# Patient Record
Sex: FEMALE | Race: WHITE | NOT HISPANIC OR LATINO | Employment: OTHER | URBAN - METROPOLITAN AREA
[De-identification: names, ages, dates, MRNs, and addresses within clinical notes are randomized per-mention and may not be internally consistent; named-entity substitution may affect disease eponyms.]

---

## 2023-12-07 ENCOUNTER — TRANSCRIBE ORDERS (OUTPATIENT)
Dept: CARDIOLOGY | Facility: CLINIC | Age: 78
End: 2023-12-07
Payer: MEDICARE

## 2023-12-07 DIAGNOSIS — Z01.810 PREOP CARDIOVASCULAR EXAM: ICD-10-CM

## 2023-12-07 DIAGNOSIS — I35.0 NONRHEUMATIC AORTIC VALVE STENOSIS: Primary | ICD-10-CM

## 2023-12-08 PROBLEM — I35.0 NONRHEUMATIC AORTIC VALVE STENOSIS: Status: ACTIVE | Noted: 2023-12-07

## 2023-12-13 RX ORDER — ATORVASTATIN CALCIUM 40 MG/1
40 TABLET, FILM COATED ORAL DAILY
Status: ON HOLD | COMMUNITY
End: 2023-12-14

## 2023-12-13 RX ORDER — HYDROCHLOROTHIAZIDE 25 MG/1
25 TABLET ORAL DAILY PRN
COMMUNITY

## 2023-12-13 RX ORDER — LISINOPRIL 40 MG/1
40 TABLET ORAL DAILY
COMMUNITY

## 2023-12-13 RX ORDER — METOPROLOL SUCCINATE 25 MG/1
25 TABLET, EXTENDED RELEASE ORAL DAILY
Status: ON HOLD | COMMUNITY
End: 2023-12-14

## 2023-12-14 ENCOUNTER — HOSPITAL ENCOUNTER (OUTPATIENT)
Facility: HOSPITAL | Age: 78
Setting detail: OBSERVATION
Discharge: HOME | End: 2023-12-15
Attending: INTERNAL MEDICINE | Admitting: INTERNAL MEDICINE
Payer: MEDICARE

## 2023-12-14 DIAGNOSIS — R06.09 DOE (DYSPNEA ON EXERTION): ICD-10-CM

## 2023-12-14 DIAGNOSIS — I35.0 NONRHEUMATIC AORTIC VALVE STENOSIS: ICD-10-CM

## 2023-12-14 DIAGNOSIS — I25.119 CORONARY ARTERY DISEASE INVOLVING NATIVE CORONARY ARTERY OF NATIVE HEART WITH ANGINA PECTORIS: Primary | ICD-10-CM

## 2023-12-14 LAB
QT INTERVAL: 416 MS
QTC INTERVAL: 465 MS

## 2023-12-14 PROCEDURE — C1769 GUIDE WIRE: HCPCS | Performed by: INTERNAL MEDICINE

## 2023-12-14 PROCEDURE — 99152 MOD SED SAME PHYS/QHP 5/>YRS: CPT | Performed by: INTERNAL MEDICINE

## 2023-12-14 PROCEDURE — 0715T PR PERCUTANEOUS TRANSLUMINAL CORONARY LITHOTRIPSY: CPT | Performed by: INTERNAL MEDICINE

## 2023-12-14 PROCEDURE — C1887 CATHETER, GUIDING: HCPCS | Performed by: INTERNAL MEDICINE

## 2023-12-14 PROCEDURE — C1894 INTRO/SHEATH, NON-LASER: HCPCS | Performed by: INTERNAL MEDICINE

## 2023-12-14 PROCEDURE — C1725 CATH, TRANSLUMIN NON-LASER: HCPCS | Performed by: INTERNAL MEDICINE

## 2023-12-14 PROCEDURE — S0260 H&P FOR SURGERY: HCPCS | Performed by: INTERNAL MEDICINE

## 2023-12-14 PROCEDURE — 25010000002 HEPARIN (PORCINE) PER 1000 UNITS: Performed by: INTERNAL MEDICINE

## 2023-12-14 PROCEDURE — 99153 MOD SED SAME PHYS/QHP EA: CPT | Performed by: INTERNAL MEDICINE

## 2023-12-14 PROCEDURE — 63710000001 CLOPIDOGREL 75 MG TABLET: Performed by: INTERNAL MEDICINE

## 2023-12-14 PROCEDURE — 63710000001 ASPIRIN 325 MG TABLET: Performed by: INTERNAL MEDICINE

## 2023-12-14 PROCEDURE — 92928 PRQ TCAT PLMT NTRAC ST 1 LES: CPT | Performed by: INTERNAL MEDICINE

## 2023-12-14 PROCEDURE — A9270 NON-COVERED ITEM OR SERVICE: HCPCS | Performed by: INTERNAL MEDICINE

## 2023-12-14 PROCEDURE — C1874 STENT, COATED/COV W/DEL SYS: HCPCS | Performed by: INTERNAL MEDICINE

## 2023-12-14 PROCEDURE — 25510000001 IOPAMIDOL PER 1 ML: Performed by: INTERNAL MEDICINE

## 2023-12-14 PROCEDURE — C1761: HCPCS | Performed by: INTERNAL MEDICINE

## 2023-12-14 PROCEDURE — 93010 ELECTROCARDIOGRAM REPORT: CPT | Performed by: INTERNAL MEDICINE

## 2023-12-14 PROCEDURE — 93454 CORONARY ARTERY ANGIO S&I: CPT | Performed by: INTERNAL MEDICINE

## 2023-12-14 PROCEDURE — 25810000003 SODIUM CHLORIDE 0.9 % SOLUTION: Performed by: INTERNAL MEDICINE

## 2023-12-14 PROCEDURE — G0378 HOSPITAL OBSERVATION PER HR: HCPCS

## 2023-12-14 PROCEDURE — 25010000002 MIDAZOLAM PER 1 MG: Performed by: INTERNAL MEDICINE

## 2023-12-14 PROCEDURE — 63710000001 ATORVASTATIN 10 MG TABLET: Performed by: INTERNAL MEDICINE

## 2023-12-14 PROCEDURE — 25010000002 FENTANYL CITRATE (PF) 50 MCG/ML SOLUTION: Performed by: INTERNAL MEDICINE

## 2023-12-14 PROCEDURE — C9600 PERC DRUG-EL COR STENT SING: HCPCS | Performed by: INTERNAL MEDICINE

## 2023-12-14 PROCEDURE — 93005 ELECTROCARDIOGRAM TRACING: CPT | Performed by: INTERNAL MEDICINE

## 2023-12-14 PROCEDURE — 85347 COAGULATION TIME ACTIVATED: CPT

## 2023-12-14 PROCEDURE — 0715T: CPT | Performed by: INTERNAL MEDICINE

## 2023-12-14 PROCEDURE — 63710000001 LISINOPRIL 20 MG TABLET: Performed by: INTERNAL MEDICINE

## 2023-12-14 DEVICE — XIENCE SKYPOINT™ EVEROLIMUS ELUTING CORONARY STENT SYSTEM 3.50 MM X 15 MM / RAPID-EXCHANGE
Type: IMPLANTABLE DEVICE | Site: CORONARY | Status: FUNCTIONAL
Brand: XIENCE SKYPOINT™

## 2023-12-14 DEVICE — XIENCE SKYPOINT™ EVEROLIMUS ELUTING CORONARY STENT SYSTEM 3.50 MM X 38 MM / RAPID-EXCHANGE
Type: IMPLANTABLE DEVICE | Site: CORONARY | Status: FUNCTIONAL
Brand: XIENCE SKYPOINT™

## 2023-12-14 RX ORDER — SODIUM CHLORIDE 9 MG/ML
40 INJECTION, SOLUTION INTRAVENOUS AS NEEDED
Status: DISCONTINUED | OUTPATIENT
Start: 2023-12-14 | End: 2023-12-14

## 2023-12-14 RX ORDER — LISINOPRIL 20 MG/1
40 TABLET ORAL DAILY
Status: DISCONTINUED | OUTPATIENT
Start: 2023-12-14 | End: 2023-12-15 | Stop reason: HOSPADM

## 2023-12-14 RX ORDER — HEPARIN SODIUM 1000 [USP'U]/ML
INJECTION, SOLUTION INTRAVENOUS; SUBCUTANEOUS
Status: DISCONTINUED | OUTPATIENT
Start: 2023-12-14 | End: 2023-12-14 | Stop reason: HOSPADM

## 2023-12-14 RX ORDER — FENTANYL CITRATE 50 UG/ML
INJECTION, SOLUTION INTRAMUSCULAR; INTRAVENOUS
Status: DISCONTINUED | OUTPATIENT
Start: 2023-12-14 | End: 2023-12-14 | Stop reason: HOSPADM

## 2023-12-14 RX ORDER — SODIUM CHLORIDE 0.9 % (FLUSH) 0.9 %
10 SYRINGE (ML) INJECTION AS NEEDED
Status: DISCONTINUED | OUTPATIENT
Start: 2023-12-14 | End: 2023-12-14

## 2023-12-14 RX ORDER — NITROGLYCERIN 0.4 MG/1
0.4 TABLET SUBLINGUAL
Status: DISCONTINUED | OUTPATIENT
Start: 2023-12-14 | End: 2023-12-15 | Stop reason: HOSPADM

## 2023-12-14 RX ORDER — CLOPIDOGREL BISULFATE 75 MG/1
TABLET ORAL
Status: DISCONTINUED | OUTPATIENT
Start: 2023-12-14 | End: 2023-12-14 | Stop reason: HOSPADM

## 2023-12-14 RX ORDER — SODIUM CHLORIDE 0.9 % (FLUSH) 0.9 %
10 SYRINGE (ML) INJECTION EVERY 12 HOURS SCHEDULED
Status: DISCONTINUED | OUTPATIENT
Start: 2023-12-14 | End: 2023-12-14

## 2023-12-14 RX ORDER — METOPROLOL TARTRATE 1 MG/ML
INJECTION, SOLUTION INTRAVENOUS
Status: DISCONTINUED | OUTPATIENT
Start: 2023-12-14 | End: 2023-12-14 | Stop reason: HOSPADM

## 2023-12-14 RX ORDER — CLOPIDOGREL BISULFATE 75 MG/1
75 TABLET ORAL DAILY
Status: DISCONTINUED | OUTPATIENT
Start: 2023-12-15 | End: 2023-12-15 | Stop reason: HOSPADM

## 2023-12-14 RX ORDER — SODIUM CHLORIDE 9 MG/ML
75 INJECTION, SOLUTION INTRAVENOUS CONTINUOUS
Status: DISCONTINUED | OUTPATIENT
Start: 2023-12-14 | End: 2023-12-15 | Stop reason: HOSPADM

## 2023-12-14 RX ORDER — MIDAZOLAM HYDROCHLORIDE 1 MG/ML
INJECTION INTRAMUSCULAR; INTRAVENOUS
Status: DISCONTINUED | OUTPATIENT
Start: 2023-12-14 | End: 2023-12-14 | Stop reason: HOSPADM

## 2023-12-14 RX ORDER — SODIUM CHLORIDE 9 MG/ML
75 INJECTION, SOLUTION INTRAVENOUS CONTINUOUS
Status: ACTIVE | OUTPATIENT
Start: 2023-12-14 | End: 2023-12-15

## 2023-12-14 RX ORDER — ACETAMINOPHEN 325 MG/1
650 TABLET ORAL EVERY 4 HOURS PRN
Status: DISCONTINUED | OUTPATIENT
Start: 2023-12-14 | End: 2023-12-15 | Stop reason: HOSPADM

## 2023-12-14 RX ORDER — ASPIRIN 81 MG/1
81 TABLET ORAL DAILY
Status: DISCONTINUED | OUTPATIENT
Start: 2023-12-14 | End: 2023-12-15 | Stop reason: HOSPADM

## 2023-12-14 RX ORDER — ATORVASTATIN CALCIUM 10 MG/1
40 TABLET, FILM COATED ORAL NIGHTLY
Status: DISCONTINUED | OUTPATIENT
Start: 2023-12-14 | End: 2023-12-15 | Stop reason: HOSPADM

## 2023-12-14 RX ORDER — VERAPAMIL HYDROCHLORIDE 2.5 MG/ML
INJECTION, SOLUTION INTRAVENOUS
Status: DISCONTINUED | OUTPATIENT
Start: 2023-12-14 | End: 2023-12-14 | Stop reason: HOSPADM

## 2023-12-14 RX ORDER — HYDROCODONE BITARTRATE AND ACETAMINOPHEN 5; 325 MG/1; MG/1
1 TABLET ORAL EVERY 4 HOURS PRN
Status: DISCONTINUED | OUTPATIENT
Start: 2023-12-14 | End: 2023-12-15 | Stop reason: HOSPADM

## 2023-12-14 RX ORDER — ASPIRIN 325 MG
TABLET ORAL
Status: DISCONTINUED | OUTPATIENT
Start: 2023-12-14 | End: 2023-12-14 | Stop reason: HOSPADM

## 2023-12-14 RX ORDER — HYDROCHLOROTHIAZIDE 25 MG/1
25 TABLET ORAL DAILY
Status: DISCONTINUED | OUTPATIENT
Start: 2023-12-14 | End: 2023-12-15

## 2023-12-14 RX ORDER — LIDOCAINE HYDROCHLORIDE 20 MG/ML
INJECTION, SOLUTION INFILTRATION; PERINEURAL
Status: DISCONTINUED | OUTPATIENT
Start: 2023-12-14 | End: 2023-12-14 | Stop reason: HOSPADM

## 2023-12-14 RX ADMIN — SODIUM CHLORIDE 75 ML/HR: 9 INJECTION, SOLUTION INTRAVENOUS at 14:08

## 2023-12-14 RX ADMIN — SODIUM CHLORIDE 75 ML/HR: 9 INJECTION, SOLUTION INTRAVENOUS at 09:47

## 2023-12-14 RX ADMIN — LISINOPRIL 40 MG: 20 TABLET ORAL at 16:34

## 2023-12-14 RX ADMIN — ATORVASTATIN CALCIUM 40 MG: 10 TABLET, FILM COATED ORAL at 20:41

## 2023-12-14 NOTE — Clinical Note
First balloon inflation max pressure = 20 zaheer. First balloon inflation duration = 5 seconds. Second inflation of balloon - Max pressure = 20 zaheer. 2nd Inflation of balloon - Duration = 5 seconds.

## 2023-12-14 NOTE — Clinical Note
Stent balloon removed intact. PROVIDER:[TOKEN:[[id number]],FOLLOWUP:[1-3 days]],PROVIDER:[TOKEN:[[id number]]]

## 2023-12-14 NOTE — Clinical Note
First balloon inflation max pressure = 6 zaheer. First balloon inflation duration = 5 seconds. Second inflation of balloon - Max pressure = 6 zaheer. 2nd Inflation of balloon - Duration = 5 seconds. Third inflation of balloon - Max pressure = 6 zaheer. 3rd Inflation of balloon - Duration = 5 seconds. Fourth inflation of balloon - Max pressure = 6 zaheer. 4th Inflation of balloon - Duration = 5 seconds.

## 2023-12-14 NOTE — PROGRESS NOTES
Patient Care Team:  Provider, No Known as PCP - General    Chief complaint: SOB    Subjective : SOB with minimal exertion; denies chest discomfort     History of Present Illness  Patient is a 78 y.o. female who follows with Dr. Luz at [facility]. She has a past medical history including hypertension and hyperlipidemia.  She presented with new onset shortness of breath and hypertension. She experiences SOB with minimal exertion, stating she can't even walk two feet without needing to stop to catch her breath. She had chest pain but that was relieved when her hypertension improved. She denies dizziness, syncope, PND, or orthopnea. She was found to have a murmur and underwent an echocardiogram that reportedly revealed severe aortic stenosis. We are awaiting these images. She was referred to Dr. Gerber and presented today as an outpatient for cardiac cath. She was found to have 90% stenosis of the mLAD and underwent AGUEDA.  CTS has been consulted for surgical opinion of TAVR versus SAVR.    Review of Systems   Constitutional:  Positive for activity change.   HENT: Negative.     Respiratory:  Positive for shortness of breath.    Cardiovascular:  Negative for chest pain and palpitations.   Musculoskeletal: Negative.    Neurological: Negative.  Negative for dizziness and light-headedness.   Hematological: Negative.         Past Medical History:   Diagnosis Date    Angina pectoris     Dyspnea on exertion     HLD (hyperlipidemia)     Hypertension      Past Surgical History:   Procedure Laterality Date    BREAST LUMPECTOMY Left     ORIF ANKLE FRACTURE Right      History reviewed. No pertinent family history.  Social History     Tobacco Use    Smoking status: Never   Substance Use Topics    Alcohol use: Yes     Comment: 'seldom'    Drug use: Not Currently     Medications Prior to Admission   Medication Sig Dispense Refill Last Dose    hydroCHLOROthiazide (HYDRODIURIL) 25 MG tablet Take 1 tablet by mouth Daily.   " 12/13/2023    lisinopril (PRINIVIL,ZESTRIL) 40 MG tablet Take 1 tablet by mouth Daily.   12/13/2023     aspirin, 81 mg, Oral, Daily  atorvastatin, 40 mg, Oral, Nightly  [START ON 12/15/2023] clopidogrel, 75 mg, Oral, Daily  hydroCHLOROthiazide, 25 mg, Oral, Daily  lisinopril, 40 mg, Oral, Daily  sodium chloride, 10 mL, Intravenous, Q12H      Allergies:  Patient has no known allergies.    Objective      Vital Signs  Temp:  [97.7 °F (36.5 °C)-98 °F (36.7 °C)] 97.7 °F (36.5 °C)  Heart Rate:  [] 81  Resp:  [12-18] 18  BP: (110-171)/() 143/85    Flowsheet Rows      Flowsheet Row First Filed Value   Admission Height 165.1 cm (65\") Documented at 12/14/2023 0944   Admission Weight 107 kg (235 lb) Documented at 12/14/2023 0944          165.1 cm (65\")    Physical Exam  Constitutional:       Appearance: She is obese.   HENT:      Head: Normocephalic.      Nose: Nose normal.      Mouth/Throat:      Mouth: Mucous membranes are moist.   Eyes:      Pupils: Pupils are equal, round, and reactive to light.   Cardiovascular:      Rate and Rhythm: Normal rate and regular rhythm.   Pulmonary:      Effort: Pulmonary effort is normal. No respiratory distress.   Abdominal:      General: Bowel sounds are normal.   Musculoskeletal:         General: Normal range of motion.      Cervical back: Normal range of motion.   Skin:     Capillary Refill: Capillary refill takes 2 to 3 seconds.      Coloration: Skin is pale.   Neurological:      General: No focal deficit present.      Mental Status: She is alert and oriented to person, place, and time.   Psychiatric:         Mood and Affect: Mood normal.         Results Review:   Lab Results (last 24 hours)       ** No results found for the last 24 hours. **                Assessment & Plan       Nonrheumatic aortic valve stenosis    EATON (dyspnea on exertion)    Coronary artery disease involving native coronary artery of native heart with angina pectoris    Aortic stenosis      Assessment & " Plan    -Severe nonrheumatic aortic valve stenosis  -CAD, s/p AGUEDA to mLAD--DAPT with ASA/Plavix   -Acute diastolic congestive heart failure, NYHA class III  -HTN  -HLD  -Obesity     Dr. Deluca will review the films and provide his surgical recommendations. We are awaiting her echocardiogram for him to review. She will be spending the night s/p PCI and we will follow-up with her tomorrow.     Thank you for allowing us to participate in the care of this patient.      Monica Dimas, APRN  12/14/23  14:32 EST

## 2023-12-14 NOTE — Clinical Note
A 4 fr sheath was  inserted with ultrasound guidance into the right radial artery. Sheath insertion not delayed.

## 2023-12-14 NOTE — Clinical Note
First balloon inflation max pressure = 12 zaheer. First balloon inflation duration = 5 seconds. Second inflation of balloon - Max pressure = 12 zaheer. 2nd Inflation of balloon - Duration = 5 seconds. 2nd inflation was done at 11:39 EST.

## 2023-12-14 NOTE — Clinical Note
Hemostasis started on the right radial artery. R-Band was used in achieving hemostasis. Radial compression device applied to vessel. Hemostasis achieved successfully. Closure device additional comment: TR band 15 cc air

## 2023-12-14 NOTE — Clinical Note
ACT = 320 (sec). ACT was drawn at 11:40 EST. ACT result was completed at 11:50 EST. Pati from [facility] is calling about a fax they sent over for the patient. She is just wanting to know the status of it. Pati can be reached at [phone].

## 2023-12-14 NOTE — DISCHARGE INSTRUCTIONS
[facility]  [address] [place] [address]    Coronary Angioplasty with or without  Stent (Radial Approach) After Care    Refer to this sheet in the next few weeks. These instructions provide you with information on caring for yourself after your procedure. Your health care provider may also give you more specific instructions. Your treatment has been planned according to current medical practices, but problems sometimes occur. Call your health care provider if you have any problems or questions after your procedure.       Home Care Instructions:  You may shower the day after the procedure. Remove the bandage (dressing) and gently wash the site with plain soap and water. Gently pat the site dry. You may apply a band aid daily for 2 days if desired.    Do not apply powder or lotion to the site.  Do not submerge the affected site in water for 3 to 5 days or until the site is completely healed.   Do not flex or bend at the wrist with affected arm for 24 hours.  Do not lift, push or pull anything over 5 pounds for 5 days after your procedure or as directed by your physician.  For a reference, a gallon of milk weighs 8 pounds.    Do not operate machinery or power tools for 24 hours.  Inspect the site at least twice daily. You may notice some bruising at the site and it may be tender for 1 to 2 weeks.     Increase your fluid intake for the next 2 days.    Keep arm elevated for 24 hours.  For the remainder of the day, keep your arm in the “Pledge of Allegiance” position when up and about.    Limit your activity for the next 48 hours and avoid strenuous activity as directed by your physician.   Cardiac Rehab may or may not be ordered.  Please consult with your physician  You may drive 24 hours after the procedure unless otherwise instructed by your caregiver.  A responsible adult should be with you for the first 24 hours after you arrive home.   Do not make any important legal decisions or sign legal  papers for 24 hours. Do not drink alcohol for 24 hours.    Take medicines only as directed by your health care provider.  Blood thinners may be prescribed after your procedure to improve blood flow through the stent.    Metformin or any medications containing Metformin should not be taken for 48 hours after your procedure.    Eat a heart-healthy diet. This should include plenty of fresh fruits and vegetables. Meat should be lean cuts. Avoid the following types of food:    Food that is high in salt.    Canned or highly processed food.    Food that is high in saturated fat or sugar.    Fried food.    Make any other lifestyle changes recommended by your health care provider. This may include:    Not using any tobacco products including cigarettes, chewing tobacco, or electronic cigarettes.   Managing your weight.    Getting regular exercise.    Managing your blood pressure.    Limiting your alcohol intake.    Managing other health problems, such as diabetes.    If you need an MRI after your heart stent was placed, be sure to tell the health care provider who orders the MRI that you have a heart stent.    Keep all follow-up visits as directed by your health care provider.      Call Your Doctor If:    You have unusual pain at the radial/ulnar (wrist) site.  You have redness, warmth, swelling, or pain at the radial/ulnar (wrist) site.  You have drainage (other than a small amount of blood on the dressing).  `You have chills or a fever > 101.  Your arm becomes pale or dark, cool, tingly, or numb.  You develop chest pain, shortness of breath, feel faint or pass out.    You have heavy bleeding from the site.  If you do, hold pressure on the site for 20 minutes.  If the bleeding stops, apply a fresh bandage and call your cardiologist.  However, if you continue to have bleeding, maintain pressure and call 911.    You have any symptoms of a stroke.  Remember BE FAST  B-balance. Sudden trouble walking or loss of  balance.  E-eyes.  Sudden changes in how you see or a sudden onset of a very bad headache.   F-face. Sudden weakness or loss of feeling of the face or facial droop on one side.   A-arms Sudden weakness or numbness in one arm. One arm drifts down if they are both held out in front of you. This happens suddenly and usually on one side of the body.   S-speech.  Sudden trouble speaking, slurred speech or trouble understanding what people are saying.   T-time  Time to call emergency services.  Write down the symptoms and the time they started.

## 2023-12-14 NOTE — CONSULTS
Patient Care Team:  Provider, No Known as PCP - General    Chief complaint: SOB with minimal exertion; denies chest discomfort      Subjective     History of Present Illness  Patient is a 78 y.o. female who follows with Dr. Luz at the [facility]. She has a past medical history including hypertension and hyperlipidemia.  She presented with new onset shortness of breath and hypertension. She experiences SOB with minimal exertion, stating she can't even walk two feet without needing to stop to catch her breath. She had chest pain but that was relieved when her hypertension improved. She denies dizziness, syncope, PND, or orthopnea. She was found to have a murmur and underwent an echocardiogram that reportedly revealed severe aortic stenosis. We are awaiting these images. She was referred to Dr. Gerber and presented today as an outpatient for cardiac cath. She was found to have 90% stenosis of the mLAD and underwent AGUEDA.  CTS has been consulted for surgical opinion of TAVR versus SAVR.     Review of Systems   Constitutional:  Positive for activity change.   HENT: Negative.     Respiratory:  Positive for shortness of breath.    Cardiovascular:  Negative for chest pain, palpitations and leg swelling.   Genitourinary: Negative.    Allergic/Immunologic: Negative.    Neurological:  Positive for weakness. Negative for dizziness.   Psychiatric/Behavioral: Negative.          Past Medical History:   Diagnosis Date    Angina pectoris     Dyspnea on exertion     HLD (hyperlipidemia)     Hypertension      Past Surgical History:   Procedure Laterality Date    BREAST LUMPECTOMY Left     ORIF ANKLE FRACTURE Right      History reviewed. No pertinent family history.  Social History     Tobacco Use    Smoking status: Never   Substance Use Topics    Alcohol use: Yes     Comment: 'seldom'    Drug use: Not Currently     Medications Prior to Admission   Medication Sig Dispense Refill Last Dose    hydroCHLOROthiazide  "(HYDRODIURIL) 25 MG tablet Take 1 tablet by mouth Daily.   12/13/2023    lisinopril (PRINIVIL,ZESTRIL) 40 MG tablet Take 1 tablet by mouth Daily.   12/13/2023     aspirin, 81 mg, Oral, Daily  atorvastatin, 40 mg, Oral, Nightly  [START ON 12/15/2023] clopidogrel, 75 mg, Oral, Daily  hydroCHLOROthiazide, 25 mg, Oral, Daily  lisinopril, 40 mg, Oral, Daily  sodium chloride, 10 mL, Intravenous, Q12H      Allergies:  Patient has no known allergies.    Objective      Vital Signs  Temp:  [97.7 °F (36.5 °C)-98 °F (36.7 °C)] 97.7 °F (36.5 °C)  Heart Rate:  [] 81  Resp:  [12-18] 18  BP: (110-171)/() 143/85    Flowsheet Rows      Flowsheet Row First Filed Value   Admission Height 165.1 cm (65\") Documented at 12/14/2023 0944   Admission Weight 107 kg (235 lb) Documented at 12/14/2023 0944          165.1 cm (65\")    Physical Exam  Constitutional:       Appearance: She is obese.   HENT:      Head: Normocephalic.      Mouth/Throat:      Mouth: Mucous membranes are moist.   Eyes:      Pupils: Pupils are equal, round, and reactive to light.   Cardiovascular:      Rate and Rhythm: Normal rate.   Pulmonary:      Effort: Pulmonary effort is normal.   Abdominal:      General: Abdomen is flat.   Musculoskeletal:         General: Normal range of motion.      Cervical back: Normal range of motion.   Skin:     General: Skin is warm and dry.      Capillary Refill: Capillary refill takes 2 to 3 seconds.   Neurological:      General: No focal deficit present.      Mental Status: She is alert and oriented to person, place, and time.   Psychiatric:         Mood and Affect: Mood normal.         Results Review:   Lab Results (last 24 hours)       ** No results found for the last 24 hours. **              Assessment & Plan       Nonrheumatic aortic valve stenosis    EATON (dyspnea on exertion)    Coronary artery disease involving native coronary artery of native heart with angina pectoris    Aortic stenosis      Assessment & " Plan    -Severe nonrheumatic aortic valve stenosis  -CAD, s/p AGUEDA to mLAD--DAPT with ASA/Plavix   -Acute diastolic congestive heart failure, NYHA class III  -HTN  -HLD  -Obesity      Await echocardiogram from [facility] for evaluation and recommendation of SAVR vs.TAVR. Post PCI care per cardiology. Optimize medically.     Thank you for allowing us to participate in the care of this patient.      Monica Dimas, APRN  12/14/23  14:34 EST

## 2023-12-14 NOTE — H&P
Date of Hospital Visit: 23  Encounter Provider: Vinod Gerber MD  Place of Service: [facility]ARD[facility]  Patient Name: Arlene Farrell  :1945  [id number]    Chief complaint: Shortness of breath    History of Present Illness: 78-year-old woman does not have a lot of medical contact had not seen physicians for years and [facility] because of shortness of breath was noted to be a little bit hypertensive but also had a loud late peaking systolic heart murmur.  Echo revealed severe aortic stenosis with a peak gradient of over 75 mmHg.  Normal LV systolic function.  She does feel better since it started treating her hypertension but she still has dyspnea on exertion with even minimal activity no real chest discomfort no PND no orthopnea no edema no syncope no bleeding difficulty no history of lung or kidney disease she does not smoke or have diabetes.  She lives alone with 2 cats no other family does not do a lot of activity    Past Medical History:   Diagnosis Date    Angina pectoris     Dyspnea on exertion     HLD (hyperlipidemia)     Hypertension        Past Surgical History:   Procedure Laterality Date    BREAST LUMPECTOMY Left     ORIF ANKLE FRACTURE Right        Medications Prior to Admission   Medication Sig Dispense Refill Last Dose    hydroCHLOROthiazide (HYDRODIURIL) 25 MG tablet Take 1 tablet by mouth Daily.   2023    lisinopril (PRINIVIL,ZESTRIL) 40 MG tablet Take 1 tablet by mouth Daily.   2023       Current Meds  No current facility-administered medications on file prior to encounter.     Current Outpatient Medications on File Prior to Encounter   Medication Sig Dispense Refill    hydroCHLOROthiazide (HYDRODIURIL) 25 MG tablet Take 1 tablet by mouth Daily.      lisinopril (PRINIVIL,ZESTRIL) 40 MG tablet Take 1 tablet by mouth Daily.      [DISCONTINUED] aspirin 81 MG EC tablet Take 1 tablet by mouth Daily.      [DISCONTINUED] atorvastatin  (LIPITOR) 40 MG tablet Take 1 tablet by mouth Daily. Has not started taking      [DISCONTINUED] lisinopril-hydrochlorothiazide (PRINZIDE,ZESTORETIC) 20-12.5 MG per tablet Take 1 tablet by mouth Daily.      [DISCONTINUED] meloxicam (MOBIC) 15 MG tablet Take 1 tablet by mouth Daily.      [DISCONTINUED] metoprolol succinate XL (TOPROL-XL) 25 MG 24 hr tablet Take 1 tablet by mouth Daily.      [DISCONTINUED] omeprazole (priLOSEC) 20 MG capsule Take 1 capsule by mouth Daily.      [DISCONTINUED] triamcinolone (KENALOG) 0.1 % cream Apply  topically 3 (Three) Times a Day. 30 g 0       Social History     Socioeconomic History    Marital status:    Tobacco Use    Smoking status: Never   Substance and Sexual Activity    Alcohol use: Yes     Comment: 'seldom'    Drug use: Not Currently    Sexual activity: Defer       Family Hx: Non-contributory    REVIEW OF SYSTEMS:   ROS was performed and is negative except as outlined in HPI     REVIEW OF SYSTEMS:   CONSTITUTIONAL: No weight loss, fever, chills, weakness or fatigue.   HEENT: Eyes: No visual loss, blurred vision, double vision or yellow sclerae. Ears, Nose, Throat: No hearing loss, sneezing, congestion, runny nose or sore throat.   SKIN: No rash or itching.     RESPIRATORY: No shortness of breath, hemoptysis, cough or sputum.   GASTROINTESTINAL: No anorexia, nausea, vomiting or diarrhea. No abdominal pain, bright red blood per rectum or melena.  NEUROLOGICAL: No headache, dizziness, syncope, paralysis, numbness or tingling in the extremities.  MUSCULOSKELETAL: No muscle, back pain, joint pain or stiffness.   HEMATOLOGIC: No anemia, bleeding or bruising.   LYMPHATICS: No enlarged nodes.  PSYCHIATRIC: No history of depression, anxiety, hallucinations.   ENDOCRINOLOGIC: No reports of sweating, cold or heat intolerance. No polyuria or polydipsia.        Objective:     Vitals:    12/14/23 0944   BP: (!) 171/107   BP Location: Right arm   Patient Position: Lying   Pulse: 100  "  Resp: 18   Temp: 98 °F (36.7 °C)   TempSrc: Temporal   SpO2: 99%   Weight: 107 kg (235 lb)   Height: 165.1 cm (65\")     Body mass index is 39.11 kg/m².  Flowsheet Rows      Flowsheet Row First Filed Value   Admission Height 165.1 cm (65\") Documented at 12/14/2023 0944   Admission Weight 107 kg (235 lb) Documented at 12/14/2023 0944            General Appearance:    Alert, oriented x 3, in no acute distress   Head:    Normocephalic, without obvious abnormality, atraumatic   Ears:    Ears appear intact with no abnormalities noted   Throat:   No oral lesions, dentition good   Neck:   No adenopathy, supple, trachea midline, no thyromegaly, no carotid bruit, no JVD   Lungs:    Breath sounds are equal and  clear to auscultation    Heart:   Normal S1 and absent S2, RRR, late peaking systolic ejection murmur/gallop or rub   Abdomen:    Normal bowel sounds, obese, soft non-tender, non-distended, no organomegaly, no guarding   Extremities:   Moves all extremities well, no edema, no cyanosis, no redness   Pulses:   Pulses palpable and equal bilaterally. Normal radial pulses   Skin:   No bleeding, bruising or rash   Lymph nodes:   No palpable adenopathy     I personally viewed and interpreted the patient's EKG/Telemetry data    Assessment:  Active Hospital Problems    Diagnosis  POA    **Nonrheumatic aortic valve stenosis [I35.0]  Unknown      Resolved Hospital Problems   No resolved problems to display.       Plan: Severe probably degenerative aortic stenosis in an elderly woman her overall frailty seems a little bit high to me.  I am leaning toward more of a TAVR evaluation for her.  She is sent for cardiac cath today if that is okay then were going to go down the road of pursuing a TAVR I think for her the low risk trials which show and equivalency and outcomes and I think overall it might be a better route for her to go.  Further decisions will be based on the findings at the time of cath today       "

## 2023-12-15 ENCOUNTER — DOCUMENTATION (OUTPATIENT)
Dept: CARDIOLOGY | Facility: HOSPITAL | Age: 78
End: 2023-12-15
Payer: MEDICARE

## 2023-12-15 VITALS
DIASTOLIC BLOOD PRESSURE: 87 MMHG | HEART RATE: 104 BPM | HEIGHT: 65 IN | OXYGEN SATURATION: 96 % | TEMPERATURE: 97.8 F | BODY MASS INDEX: 39.15 KG/M2 | WEIGHT: 235 LBS | RESPIRATION RATE: 18 BRPM | SYSTOLIC BLOOD PRESSURE: 112 MMHG

## 2023-12-15 DIAGNOSIS — I35.0 NONRHEUMATIC AORTIC VALVE STENOSIS: Primary | ICD-10-CM

## 2023-12-15 LAB
ACT BLD: 282 SECONDS (ref 82–152)
ANION GAP SERPL CALCULATED.3IONS-SCNC: 11.5 MMOL/L (ref 5–15)
BUN SERPL-MCNC: 29 MG/DL (ref 8–23)
BUN/CREAT SERPL: 30.2 (ref 7–25)
CALCIUM SPEC-SCNC: 9.2 MG/DL (ref 8.6–10.5)
CHLORIDE SERPL-SCNC: 100 MMOL/L (ref 98–107)
CHOLEST SERPL-MCNC: 200 MG/DL (ref 0–200)
CO2 SERPL-SCNC: 23.5 MMOL/L (ref 22–29)
CREAT SERPL-MCNC: 0.96 MG/DL (ref 0.57–1)
DEPRECATED RDW RBC AUTO: 41 FL (ref 37–54)
EGFRCR SERPLBLD CKD-EPI 2021: 61.1 ML/MIN/1.73
ERYTHROCYTE [DISTWIDTH] IN BLOOD BY AUTOMATED COUNT: 14.2 % (ref 12.3–15.4)
GLUCOSE SERPL-MCNC: 112 MG/DL (ref 65–99)
HBA1C MFR BLD: 6.4 % (ref 4.8–5.6)
HCT VFR BLD AUTO: 33.5 % (ref 34–46.6)
HDLC SERPL-MCNC: 51 MG/DL (ref 40–60)
HGB BLD-MCNC: 10.6 G/DL (ref 12–15.9)
LDLC SERPL CALC-MCNC: 130 MG/DL (ref 0–100)
LDLC/HDLC SERPL: 2.51 {RATIO}
MCH RBC QN AUTO: 25.4 PG (ref 26.6–33)
MCHC RBC AUTO-ENTMCNC: 31.6 G/DL (ref 31.5–35.7)
MCV RBC AUTO: 80.1 FL (ref 79–97)
PLATELET # BLD AUTO: 221 10*3/MM3 (ref 140–450)
PMV BLD AUTO: 10 FL (ref 6–12)
POTASSIUM SERPL-SCNC: 4 MMOL/L (ref 3.5–5.2)
QT INTERVAL: 362 MS
QTC INTERVAL: 426 MS
RBC # BLD AUTO: 4.18 10*6/MM3 (ref 3.77–5.28)
SODIUM SERPL-SCNC: 135 MMOL/L (ref 136–145)
TRIGL SERPL-MCNC: 106 MG/DL (ref 0–150)
VLDLC SERPL-MCNC: 19 MG/DL (ref 5–40)
WBC NRBC COR # BLD AUTO: 8.72 10*3/MM3 (ref 3.4–10.8)

## 2023-12-15 PROCEDURE — 80061 LIPID PANEL: CPT | Performed by: INTERNAL MEDICINE

## 2023-12-15 PROCEDURE — 63710000001 LISINOPRIL 20 MG TABLET: Performed by: INTERNAL MEDICINE

## 2023-12-15 PROCEDURE — 80048 BASIC METABOLIC PNL TOTAL CA: CPT | Performed by: INTERNAL MEDICINE

## 2023-12-15 PROCEDURE — 93005 ELECTROCARDIOGRAM TRACING: CPT | Performed by: INTERNAL MEDICINE

## 2023-12-15 PROCEDURE — A9270 NON-COVERED ITEM OR SERVICE: HCPCS | Performed by: INTERNAL MEDICINE

## 2023-12-15 PROCEDURE — 85027 COMPLETE CBC AUTOMATED: CPT | Performed by: INTERNAL MEDICINE

## 2023-12-15 PROCEDURE — G0378 HOSPITAL OBSERVATION PER HR: HCPCS

## 2023-12-15 PROCEDURE — 63710000001 HYDROCHLOROTHIAZIDE 25 MG TABLET: Performed by: INTERNAL MEDICINE

## 2023-12-15 PROCEDURE — 63710000001 ASPIRIN 81 MG TABLET DELAYED-RELEASE: Performed by: INTERNAL MEDICINE

## 2023-12-15 PROCEDURE — 63710000001 CLOPIDOGREL 75 MG TABLET: Performed by: INTERNAL MEDICINE

## 2023-12-15 PROCEDURE — 93010 ELECTROCARDIOGRAM REPORT: CPT | Performed by: INTERNAL MEDICINE

## 2023-12-15 PROCEDURE — 99238 HOSP IP/OBS DSCHRG MGMT 30/<: CPT | Performed by: NURSE PRACTITIONER

## 2023-12-15 PROCEDURE — 83036 HEMOGLOBIN GLYCOSYLATED A1C: CPT | Performed by: INTERNAL MEDICINE

## 2023-12-15 RX ORDER — ATORVASTATIN CALCIUM 40 MG/1
40 TABLET, FILM COATED ORAL NIGHTLY
Qty: 90 TABLET | Refills: 0 | Status: SHIPPED | OUTPATIENT
Start: 2023-12-15

## 2023-12-15 RX ORDER — NITROGLYCERIN 0.4 MG/1
0.4 TABLET SUBLINGUAL
Qty: 50 TABLET | Refills: 12 | Status: SHIPPED | OUTPATIENT
Start: 2023-12-15

## 2023-12-15 RX ORDER — HYDROCHLOROTHIAZIDE 25 MG/1
25 TABLET ORAL DAILY
Status: DISCONTINUED | OUTPATIENT
Start: 2023-12-15 | End: 2023-12-15 | Stop reason: HOSPADM

## 2023-12-15 RX ORDER — METOPROLOL SUCCINATE 25 MG/1
25 TABLET, EXTENDED RELEASE ORAL DAILY
Qty: 90 TABLET | Refills: 0 | Status: SHIPPED | OUTPATIENT
Start: 2023-12-15

## 2023-12-15 RX ORDER — CLOPIDOGREL BISULFATE 75 MG/1
75 TABLET ORAL DAILY
Qty: 90 TABLET | Refills: 3 | Status: SHIPPED | OUTPATIENT
Start: 2023-12-15

## 2023-12-15 RX ORDER — ASPIRIN 81 MG/1
81 TABLET ORAL DAILY
Qty: 90 TABLET | Refills: 0 | Status: SHIPPED | OUTPATIENT
Start: 2023-12-15

## 2023-12-15 RX ADMIN — LISINOPRIL 40 MG: 20 TABLET ORAL at 08:36

## 2023-12-15 RX ADMIN — HYDROCHLOROTHIAZIDE 25 MG: 25 TABLET ORAL at 08:35

## 2023-12-15 RX ADMIN — ASPIRIN 81 MG: 81 TABLET, COATED ORAL at 08:35

## 2023-12-15 RX ADMIN — CLOPIDOGREL BISULFATE 75 MG: 75 TABLET, FILM COATED ORAL at 08:35

## 2023-12-15 NOTE — NURSING NOTE
I met with Ms Farrell while she was here for her cardiac cath. She had a PCI placed Dr Gerber has asked that she have a CTA in the next week or so and this will be scheduled as an out patient I introduced the structural heart program and we discussed the evaluation process She is agreeable to having the CTA completed as soon as it can be scheduled I provided our introductory packet including information on shared decision making. I also gave her our contact information and she will call with any further questions

## 2023-12-15 NOTE — PROGRESS NOTES
[facility] Clinical Pharmacy Services: Pharmacy Education - Post PCI Discharge Medication    Arlene Farrell was counseled over post-PCI medications prior to discharge. Counseling points included the followin) Plavix's indication, patient's need for the medication, and dosing/frequency  2) Enforced the importance of taking Plavix/aspirin as instructed every day  3) Explained possible side effects of Plavix/aspirin therapy, including increased risk of bleeding, s/sx of bleeding, and increase in cold intolerance. Also talked about ways to control bleeding for minor cuts and scrapes.  4) Discussed all important drug interactions, including over-the-counter medications.     Patient was also started on high dose atorvastatin. Talked about patient's need for medications in light of stent placement, dosing/frequency, and importance of taking medication at night. Talked about interactions, including interaction with grapefruit juice, and using alcohol in moderation.     Explained risk for thrombosis on new stent (especially in the first 3-6 months) and medication compliance. Patient also discharged on nitroglycerin sublingual tablets and metoprolol XL.    Patient expressed understanding and had no further questions.      Thelma Alvarado, Pharm.D., [facility]   Clinical Staff Pharmacist   Phone Extension #[phone]

## 2023-12-15 NOTE — DISCHARGE SUMMARY
Hospital Discharge    Patient Name: Arlene Farrell  Age/Sex: 78 y.o. female  : 1945  MRN: [id number]    Encounter Provider: TYREL Atkins  Referring Provider: Vinod Gerber MD  Place of Service: [facility] CARDIOLOGY  Patient Care Team:  Provider, No Known as PCP - General         Date of Discharge:  12/15/2023   Date of Admit: 2023    Discharge Condition: Good  Discharge Diagnosis:    Nonrheumatic aortic valve stenosis    EATON (dyspnea on exertion)    Coronary artery disease involving native coronary artery of native heart with angina pectoris    Aortic stenosis      Hospital Course:   Arlene Farrell is a 78 y.o. female [facility].  She was noted to have a loud late peaking systolic murmur and was subsequently found to have severe aortic stenosis.  Subsequently, she was referred for a left and right cardiac catheterization.  On , this demonstrated severe aortic stenosis and severe disease in the mid LAD for which she underwent angioplasty and drug-eluting stent placement.  She was started on DAPT with aspirin and clopidogrel along with high intensity statin therapy.  The plan is to proceed with a TAVR evaluation, and a TAVR CTA will be arranged.  This morning she is stable and ready for discharge.  She will follow-up with me in 1 week.    Objective:  Temp:  [97.6 °F (36.4 °C)-98.5 °F (36.9 °C)] 97.8 °F (36.6 °C)  Heart Rate:  [] 94  Resp:  [12-18] 18  BP: (102-171)/() 140/90    Intake/Output Summary (Last 24 hours) at 12/15/2023 0926  Last data filed at 12/15/2023 0845  Gross per 24 hour   Intake 750 ml   Output --   Net 750 ml     Body mass index is 39.11 kg/m².      23  0944   Weight: 107 kg (235 lb)     Weight change:     Physical Exam:  Constitutional: Well appearing, well developed, no acute distress   HENT: Oropharynx clear and membrane moist  Eyes: Normal  conjunctiva, no sclera icterus.  Neck: Supple, no carotid bruit bilaterally.  Cardiovascular: Regular rate and rhythm, Late peaking systolic murmur over the right upper sternal border, No bilateral lower extremity edema.  Pulmonary: Normal respiratory effort, normal lung sounds, no wheezing.  Abdominal: Soft, nontender, no hepatosplenomegaly, liver is non-pulsatile.  Neurological: Alert and orient x 3.   Skin: Warm, dry, no ecchymosis, no rash. Radial site well healed without erythema or edema. Proximal and distal pulses palpable. Good capillary refill.  Psych: Appropriate mood and affect. Normal judgment and insight.    Procedures Performed  Procedure(s):  Left Heart Cath  Intravascular Lithotripsy  Percutaneous Coronary Intervention  Stent AGUEDA coronary  Coronary angiography     CARDIAC CATHETERIZATION REPORT     Procedure:Left heart catheterization, angioplasty, coronary lithotripsy and drug eluting stenting     DATE OF PROCEDURE: 23     PROCEDURE PERFORMED BY: Vinod Gerber MD, [facility]     INDICATION FOR PROCEDURE: Severe aortic stenosis     DESCRIPTION OF PROCEDURE: After consent was obtained, access was gained in his right radial artery using a micropuncture technique. A 4 and then we upsized to a 5-German short sheath was placed without difficulty.  Intraarterial cocktail was given.  We did not cross the aortic valve.  Selective injection of the left and right coronary arteries were performed using a JL-4 and JR-4 diagnostic catheter. Patient tolerated the procedure well without early complication and EBL was minimal.  At the conclusion, the sheath was removed and hemostasis was obtained. The patient went to the prep holding area in stable condition.     FINDINGS:     LEFT VENTRICULOGRAPHY: Severe calcium in the coronary arteries and aortic valve  CORONARY ANGIOGRAPHY:  Left main: Long left main with some 10% luminal regularities  Left anterior descendin% origin disease 90% mid LAD lesion there is  a first diagonal that subtotally occluded it is not very big now the distal LAD looks pretty good with some 20% distal  Ramus intermedius:Not present  Circumflex: The circumflex is a giant vessel that is normal throughout  RCA: Is a dominant vessel.  20% proximal 30 to 40% mid lesion small branch of the NICOLE that subtotally occluded     Interventional Note:  I exchanged a 6 Fr sheath in the R radial artery over a wire.  12,000 units of heparin was given and the ACT was therapeutic.  Clopidogrel was given in a loading dose of 600 mg.  A 6 Ukrainian Voda left 3.5 guiding catheter was passed up and intubated the left main coronary artery.  A BMW wire was passed into the distal LAD.  A lot of calcium in this grabbing the LAD wire.  I went up with a 3.5x20 trek balloon inflated at 14 zaheer twice in the mid and proximal LAD and at that point went ahead and seated the wire down to the distal LAD.  At that point, we then brought a 3.5 x 12 mm shockwave lithotripsy balloon up inflated at 4 zaheer multiple times and delivered a total of 80 ultrasound counterpulsation's through the mid and proximal LAD.  I postdilated that then with a 3.5 x 20 NC trek balloon at 20 zaheer twice.  We had a little bit of a filling defect in the LAD and she started to have some chest discomfort I could not get a stent to go into the calcium so we brought a guide liner up and then brought the 3/5/2020 trek balloon down inflated it at 10 zaheer with the filling defect and it resolved and I was able to get the guide liner down to the mid LAD and then we were able to get a 3/5/1938 Xience drug-eluting stent in pulled the guide liner back deployed the stent at 14 zahere I brought a second stent 3.5x15 Xience and deployed that at 14 zaheer we postdilated the stents with a 3.5x20 NC trek balloon at 20 zaheer multiple times.  There was 0% residual and RENU-3 flow and at that point balloons wires and guides were removed this case was halted a TR band was applied            SUMMARY: Severe aortic stenosis with class III heart failure symptoms but also had severe disease in the mid LAD successful angioplasty drug-eluting stenting     EBL:                Minimal  Specimens:     None     PCI Segment:              Mid LAD  Pre-stenosis:               90  Post-stenosis              0  Lesion Type                C  RENU Flow Pre              3  RENU Flow Post            3  Dissection                   none        RECOMMENDATIONS: Routine PCI care we will proceed with a TAVR evaluation for her    Consults:  Consults       Date and Time Order Name Status Description    12/14/2023 12:16 PM Inpatient Cardiothoracic Surgery Consult Completed             Pertinent Test Results:  Results from last 7 days   Lab Units 12/15/23  0358   SODIUM mmol/L 135*   POTASSIUM mmol/L 4.0   CHLORIDE mmol/L 100   CO2 mmol/L 23.5   BUN mg/dL 29*   CREATININE mg/dL 0.96   GLUCOSE mg/dL 112*   CALCIUM mg/dL 9.2         Results from last 7 days   Lab Units 12/15/23  0358   WBC 10*3/mm3 8.72   HEMOGLOBIN g/dL 10.6*   HEMATOCRIT % 33.5*   PLATELETS 10*3/mm3 221             Results from last 7 days   Lab Units 12/15/23  0358   CHOLESTEROL mg/dL 200   TRIGLYCERIDES mg/dL 106   HDL CHOL mg/dL 51               Discharge Medications     Discharge Medications        New Medications        Instructions Start Date   aspirin 81 MG EC tablet   81 mg, Oral, Daily      atorvastatin 40 MG tablet  Commonly known as: LIPITOR   40 mg, Oral, Nightly      clopidogrel 75 MG tablet  Commonly known as: PLAVIX   75 mg, Oral, Daily      metoprolol succinate XL 25 MG 24 hr tablet  Commonly known as: TOPROL-XL   25 mg, Oral, Daily      nitroglycerin 0.4 MG SL tablet  Commonly known as: NITROSTAT   0.4 mg, Sublingual, Every 5 Minutes PRN, Take no more than 3 doses in 15 minutes.             Continue These Medications        Instructions Start Date   hydroCHLOROthiazide 25 MG tablet  Commonly known as: HYDRODIURIL   25 mg, Oral, Daily       lisinopril 40 MG tablet  Commonly known as: PRINIVIL,ZESTRIL   40 mg, Oral, Daily               Discharge Diet:    Dietary Orders (From admission, onward)       Start     Ordered    12/15/23 0728  Diet: Cardiac Diets; Healthy Heart (2-3 Na+); Texture: Regular Texture (IDDSI 7); Fluid Consistency: Thin (IDDSI 0)  Diet Effective Now        References:    Diet Order [place]   Question Answer Comment   Diets: Cardiac Diets    Cardiac Diet: Healthy Heart (2-3 Na+)    Texture: Regular Texture (IDDSI 7)    Fluid Consistency: Thin (IDDSI 0)        12/15/23 0727                    Activity at Discharge: As tolerated       Discharge disposition: home     Discharge Instructions and Follow ups:  No future appointments.  Additional Instructions for the Follow-ups that You Need to Schedule       Ambulatory Referral to Cardiac Rehab   As directed             Follow-up Information       [facility] .    Specialty: Cardiac Rehabilitation  Contact information:  [address] [facility] [address] [phone]             Rowan Bonner APRN Follow up in 1 week(s).    Specialties: Nurse Practitioner, Cardiology  Contact information:  [address] [id number] [phone]                             Test Results Pending at Discharge:      TYREL Atkins  12/15/23  09:26 EST    Time: Discharge <30 min

## 2023-12-15 NOTE — PROGRESS NOTES
" LOS: 0 days   Patient Care Team:  Provider, No Known as PCP - General    Chief Complaint: SOB; severe AS    Subjective      Vital Signs  Temp:  [97.6 °F (36.4 °C)-98.5 °F (36.9 °C)] 97.8 °F (36.6 °C)  Heart Rate:  [] 94  Resp:  [12-18] 18  BP: (102-171)/() 140/90  Body mass index is 39.11 kg/m².    Intake/Output Summary (Last 24 hours) at 12/15/2023 0811  Last data filed at 12/14/2023 1720  Gross per 24 hour   Intake 510 ml   Output --   Net 510 ml     No intake/output data recorded.          12/14/23  0944   Weight: 107 kg (235 lb)         Objective:  Vital signs: (most recent): Blood pressure 140/90, pulse 94, temperature 97.8 °F (36.6 °C), temperature source Oral, resp. rate 18, height 165.1 cm (65\"), weight 107 kg (235 lb), SpO2 96%.                Results Review:        WBC WBC   Date Value Ref Range Status   12/15/2023 8.72 3.40 - 10.80 10*3/mm3 Final      HGB Hemoglobin   Date Value Ref Range Status   12/15/2023 10.6 (L) 12.0 - 15.9 g/dL Final      HCT Hematocrit   Date Value Ref Range Status   12/15/2023 33.5 (L) 34.0 - 46.6 % Final      Platelets Platelets   Date Value Ref Range Status   12/15/2023 221 140 - 450 10*3/mm3 Final        PT/INR:  No results found for: \"PROTIME\"/No results found for: \"INR\"    Sodium Sodium   Date Value Ref Range Status   12/15/2023 135 (L) 136 - 145 mmol/L Final      Potassium Potassium   Date Value Ref Range Status   12/15/2023 4.0 3.5 - 5.2 mmol/L Final      Chloride Chloride   Date Value Ref Range Status   12/15/2023 100 98 - 107 mmol/L Final      Bicarbonate CO2   Date Value Ref Range Status   12/15/2023 23.5 22.0 - 29.0 mmol/L Final      BUN BUN   Date Value Ref Range Status   12/15/2023 29 (H) 8 - 23 mg/dL Final      Creatinine Creatinine   Date Value Ref Range Status   12/15/2023 0.96 0.57 - 1.00 mg/dL Final      Calcium Calcium   Date Value Ref Range Status   12/15/2023 9.2 8.6 - 10.5 mg/dL Final      Magnesium No results found for: \"MG\"       aspirin, 81 " mg, Oral, Daily  atorvastatin, 40 mg, Oral, Nightly  clopidogrel, 75 mg, Oral, Daily  hydroCHLOROthiazide Oral, 25 mg, Oral, Daily  lisinopril, 40 mg, Oral, Daily      sodium chloride, 75 mL/hr, Last Rate: 75 mL/hr (12/14/23 6061)              Nonrheumatic aortic valve stenosis    EATON (dyspnea on exertion)    Coronary artery disease involving native coronary artery of native heart with angina pectoris    Aortic stenosis      Assessment & Plan    -Severe nonrheumatic aortic valve stenosis  -CAD, s/p AGUEDA to mLAD--DAPT with ASA/Plavix   -Acute diastolic congestive heart failure, NYHA class III  -HTN  -HLD  -Obesity     I saw the patient with Dr. Deluca today. Plan is to proceed with a TAVR evaluation with TAVR CTA as an outpatient. OK to d/c today from our standpoint.    12/15/23  08:11 EST

## 2023-12-18 ENCOUNTER — TELEPHONE (OUTPATIENT)
Dept: CARDIAC REHAB | Facility: HOSPITAL | Age: 78
End: 2023-12-18
Payer: MEDICARE

## 2023-12-18 NOTE — TELEPHONE ENCOUNTER
I just spoke with patient re: her recent referral to cardiac rehab s/p AGUEDA x 2.  She is currently being worked up for a possible TAVR.  She states she will call us to schedule cardiac rehab if she does not qualify for TAVR.     [facility]  We talked about the [facility] as the probable location.     Thank you for always referring your patients to cardiac rehab!

## 2023-12-21 ENCOUNTER — TELEPHONE (OUTPATIENT)
Dept: CARDIOLOGY | Facility: HOSPITAL | Age: 78
End: 2023-12-21
Payer: MEDICARE

## 2023-12-22 ENCOUNTER — OFFICE VISIT (OUTPATIENT)
Dept: CARDIOLOGY | Facility: CLINIC | Age: 78
End: 2023-12-22
Payer: MEDICARE

## 2023-12-22 VITALS
WEIGHT: 233 LBS | HEART RATE: 89 BPM | DIASTOLIC BLOOD PRESSURE: 84 MMHG | SYSTOLIC BLOOD PRESSURE: 128 MMHG | HEIGHT: 65 IN | BODY MASS INDEX: 38.82 KG/M2

## 2023-12-22 DIAGNOSIS — I10 PRIMARY HYPERTENSION: ICD-10-CM

## 2023-12-22 DIAGNOSIS — I35.0 NONRHEUMATIC AORTIC VALVE STENOSIS: ICD-10-CM

## 2023-12-22 DIAGNOSIS — E78.5 HYPERLIPIDEMIA, UNSPECIFIED HYPERLIPIDEMIA TYPE: ICD-10-CM

## 2023-12-22 DIAGNOSIS — I25.119 CORONARY ARTERY DISEASE INVOLVING NATIVE CORONARY ARTERY OF NATIVE HEART WITH ANGINA PECTORIS: Primary | ICD-10-CM

## 2023-12-22 NOTE — PROGRESS NOTES
Date of Office Visit: 2023  Encounter Provider: TYREL Atkins  Place of Service: [facility] CARDIOLOGY  Patient Name: Arlene Farrell  :1945    Chief Complaint   Patient presents with    Coronary Artery Disease   :     HPI: Arlene Farrell is a 78 y.o. female patient with no prior medical history.  She recently [facility] for complaints of shortness of breath and was noted to have a loud late peaking systolic murmur.  Echocardiogram confirmed severe aortic stenosis for which she was referred for a right and left heart catheterization.  On , this demonstrated severe aortic stenosis as well as a severe mid LAD lesion for which she underwent successful angioplasty and drug-eluting stent placement.  She was referred for a TAVR.  On 12/15, she was stable for discharge.  She is here today for follow-up.    Overall she is feeling okay.  She is still reporting pretty significant dyspnea on exertion.  It is not any worse but is no better.  She denies any PND, orthopnea, or edema.  She denies any chest pain, palpitations, dizziness, syncope, bleeding difficulties or melena.    Past Medical History:   Diagnosis Date    Angina pectoris     Dyspnea on exertion     HLD (hyperlipidemia) 2023    Hypertension 2023       Past Surgical History:   Procedure Laterality Date    BREAST LUMPECTOMY Left     CARDIAC CATHETERIZATION N/A 2023    Procedure: Left Heart Cath;  Surgeon: Vinod Gerber MD;  Location: [facility] INVASIVE LOCATION;  Service: Cardiovascular;  Laterality: N/A;    CARDIAC CATHETERIZATION N/A 2023    Procedure: Percutaneous Coronary Intervention;  Surgeon: Vinod Gerber MD;  Location: [facility] INVASIVE LOCATION;  Service: Cardiovascular;  Laterality: N/A;    CARDIAC CATHETERIZATION N/A 2023    Procedure: Stent AGUEDA coronary;  Surgeon: Vinod Gerber MD;  Location: [facility] INVASIVE LOCATION;   Service: Cardiovascular;  Laterality: N/A;    CARDIAC CATHETERIZATION N/A 12/14/2023    Procedure: Coronary angiography;  Surgeon: Vinod Gerber MD;  Location: [facility] INV[facility];  Service: Cardiovascular;  Laterality: N/A;    ORIF ANKLE FRACTURE Right        Social History     Socioeconomic History    Marital status:    Tobacco Use    Smoking status: Never    Smokeless tobacco: Never   Vaping Use    Vaping Use: Never used   Substance and Sexual Activity    Alcohol use: Yes     Comment: 'seldom'    Drug use: Not Currently    Sexual activity: Defer       History reviewed. No pertinent family history.    Review of Systems   Constitutional: Negative.   Cardiovascular:  Positive for dyspnea on exertion. Negative for chest pain, leg swelling, orthopnea, paroxysmal nocturnal dyspnea and syncope.   Respiratory: Negative.     Hematologic/Lymphatic: Negative for bleeding problem.   Musculoskeletal:  Negative for falls.   Gastrointestinal:  Negative for melena.   Neurological:  Negative for dizziness and light-headedness.       No Known Allergies      Current Outpatient Medications:     aspirin 81 MG EC tablet, Take 1 tablet by mouth Daily., Disp: 90 tablet, Rfl: 0    atorvastatin (LIPITOR) 40 MG tablet, Take 1 tablet by mouth Every Night., Disp: 90 tablet, Rfl: 0    clopidogrel (PLAVIX) 75 MG tablet, Take 1 tablet by mouth Daily., Disp: 90 tablet, Rfl: 3    hydroCHLOROthiazide (HYDRODIURIL) 25 MG tablet, Take 1 tablet by mouth Daily As Needed (swelling)., Disp: , Rfl:     lisinopril (PRINIVIL,ZESTRIL) 40 MG tablet, Take 1 tablet by mouth Daily., Disp: , Rfl:     metoprolol succinate XL (TOPROL-XL) 25 MG 24 hr tablet, Take 1 tablet by mouth Daily., Disp: 90 tablet, Rfl: 0    nitroglycerin (NITROSTAT) 0.4 MG SL tablet, Place 1 tablet under the tongue Every 5 (Five) Minutes As Needed for Chest Pain (Systolic BP Greater Than 100). Take no more than 3 doses in 15 minutes., Disp: 50 tablet, Rfl: 12     "  Objective:     Vitals:    12/22/23 0946   BP: 128/84   Pulse: 89   Weight: 106 kg (233 lb)   Height: 165.1 cm (65\")     Body mass index is 38.77 kg/m².    PHYSICAL EXAM:    Neck:      Vascular: No JVD.   Pulmonary:      Effort: Pulmonary effort is normal.      Breath sounds: Normal breath sounds.   Cardiovascular:      Normal rate. Regular rhythm.      Murmurs: There is a harsh midsystolic murmur at the URSB, radiating to the neck.      No gallop.  No click. No rub.   Pulses:     Intact distal pulses.   Skin:     Comments: Radial site well healed without erythema or edema. Proximal and distal pulses palpable. Good capillary refill.             ECG 12 Lead    Date/Time: 12/22/2023 9:57 AM  Performed by: Rowan Bonner APRN    Authorized by: Rowan Bonner APRN  Comparison: compared with previous ECG from 12/15/2023  Similar to previous ECG  Rhythm: sinus rhythm  Ectopy: unifocal PVCs  Rate: normal  BPM: 89            Assessment:       Diagnosis Plan   1. Coronary artery disease involving native coronary artery of native heart with angina pectoris  ECG 12 Lead      2. Nonrheumatic aortic valve stenosis        3. Primary hypertension        4. Hyperlipidemia, unspecified hyperlipidemia type          Orders Placed This Encounter   Procedures    ECG 12 Lead     This order was created via procedure documentation     Order Specific Question:   Release to patient     Answer:   Routine Release [[id number]]          Plan:       1.  Coronary artery disease.  Status post angioplasty and drug-eluting stenting of the mid LAD.  She is on DAPT with aspirin and clopidogrel along with atorvastatin.      2.  Aortic stenosis.  TAVR evaluation underway.  She is scheduled for a TAVR CTA on 1/2/2024.       3.  Hypertension.  Her blood pressure looks great.  Continue lisinopril and Toprol.  She takes HCTZ as needed for swelling.      4.  Hyperlipidemia.  Lipid panel from the hospitalization demonstrated an LDL of 130.  She " was started on atorvastatin.  She will need repeat lipids in 3 months.      I think she is doing well.  I am not making any changes today.  Proceed with TAVR evaluation.      As always, it has been a pleasure to participate in your patient's care.      Sincerely,         TYREL Olmos

## 2024-01-02 ENCOUNTER — HOSPITAL ENCOUNTER (OUTPATIENT)
Dept: CT IMAGING | Facility: HOSPITAL | Age: 79
Discharge: HOME | End: 2024-01-02
Payer: MEDICARE

## 2024-01-04 ENCOUNTER — HOSPITAL ENCOUNTER (OUTPATIENT)
Dept: CT IMAGING | Facility: HOSPITAL | Age: 79
Discharge: HOME | End: 2024-01-04
Payer: MEDICARE

## 2024-01-04 VITALS
OXYGEN SATURATION: 96 % | DIASTOLIC BLOOD PRESSURE: 106 MMHG | HEART RATE: 73 BPM | SYSTOLIC BLOOD PRESSURE: 178 MMHG | RESPIRATION RATE: 16 BRPM

## 2024-01-04 DIAGNOSIS — I35.0 NONRHEUMATIC AORTIC VALVE STENOSIS: ICD-10-CM

## 2024-01-04 LAB — CREAT BLDA-MCNC: 1 MG/DL (ref 0.6–1.3)

## 2024-01-04 PROCEDURE — 25510000001 IOPAMIDOL PER 1 ML: Performed by: INTERNAL MEDICINE

## 2024-01-04 PROCEDURE — 71275 CT ANGIOGRAPHY CHEST: CPT

## 2024-01-04 PROCEDURE — 74174 CTA ABD&PLVS W/CONTRAST: CPT

## 2024-01-04 PROCEDURE — 82565 ASSAY OF CREATININE: CPT

## 2024-01-04 RX ORDER — METOPROLOL TARTRATE 1 MG/ML
5 INJECTION, SOLUTION INTRAVENOUS
Status: COMPLETED | OUTPATIENT
Start: 2024-01-04 | End: 2024-01-04

## 2024-01-04 RX ADMIN — METOPROLOL TARTRATE 5 MG: 1 INJECTION, SOLUTION INTRAVENOUS at 13:28

## 2024-01-04 RX ADMIN — IOPAMIDOL 97 ML: 755 INJECTION, SOLUTION INTRAVENOUS at 14:15

## 2024-01-04 RX ADMIN — METOPROLOL TARTRATE 5 MG: 1 INJECTION, SOLUTION INTRAVENOUS at 13:47

## 2024-01-04 NOTE — NURSING NOTE
Call to Dr. Gerber, spoke with Sd Melchor RN. Orders for iv lopressor received , may give 2 doses to try to get heart to 70. If heart rate not 70 after 2 doses, scan anyway.

## 2024-01-04 NOTE — NURSING NOTE
Patient arrived in Radiology triage from CT . Patient is here for CTA TAVR and patient did not take any of her home meds. Heart rate is 92, bp is 175/111 and I'm calling ordering provider for orders.

## 2024-01-10 ENCOUNTER — TELEPHONE (OUTPATIENT)
Dept: CARDIOLOGY | Facility: CLINIC | Age: 79
End: 2024-01-10
Payer: MEDICARE

## 2024-01-16 ENCOUNTER — OFFICE VISIT (OUTPATIENT)
Dept: CARDIAC SURGERY | Facility: CLINIC | Age: 79
End: 2024-01-16
Payer: MEDICARE

## 2024-01-16 VITALS
HEIGHT: 66 IN | TEMPERATURE: 97.5 F | DIASTOLIC BLOOD PRESSURE: 98 MMHG | WEIGHT: 238 LBS | RESPIRATION RATE: 20 BRPM | HEART RATE: 94 BPM | OXYGEN SATURATION: 97 % | SYSTOLIC BLOOD PRESSURE: 157 MMHG | BODY MASS INDEX: 38.25 KG/M2

## 2024-01-16 DIAGNOSIS — I10 PRIMARY HYPERTENSION: ICD-10-CM

## 2024-01-16 DIAGNOSIS — R06.09 DOE (DYSPNEA ON EXERTION): ICD-10-CM

## 2024-01-16 DIAGNOSIS — E78.5 HYPERLIPIDEMIA, UNSPECIFIED HYPERLIPIDEMIA TYPE: ICD-10-CM

## 2024-01-16 DIAGNOSIS — I35.0 NONRHEUMATIC AORTIC VALVE STENOSIS: Primary | ICD-10-CM

## 2024-01-16 DIAGNOSIS — I25.119 CORONARY ARTERY DISEASE INVOLVING NATIVE CORONARY ARTERY OF NATIVE HEART WITH ANGINA PECTORIS: ICD-10-CM

## 2024-01-16 PROCEDURE — 3080F DIAST BP >= 90 MM HG: CPT | Performed by: THORACIC SURGERY (CARDIOTHORACIC VASCULAR SURGERY)

## 2024-01-16 PROCEDURE — 1160F RVW MEDS BY RX/DR IN RCRD: CPT | Performed by: THORACIC SURGERY (CARDIOTHORACIC VASCULAR SURGERY)

## 2024-01-16 PROCEDURE — 1159F MED LIST DOCD IN RCRD: CPT | Performed by: THORACIC SURGERY (CARDIOTHORACIC VASCULAR SURGERY)

## 2024-01-16 PROCEDURE — 3077F SYST BP >= 140 MM HG: CPT | Performed by: THORACIC SURGERY (CARDIOTHORACIC VASCULAR SURGERY)

## 2024-01-16 PROCEDURE — 99214 OFFICE O/P EST MOD 30 MIN: CPT | Performed by: THORACIC SURGERY (CARDIOTHORACIC VASCULAR SURGERY)

## 2024-01-16 NOTE — LETTER
January 23, 2024     Abdi Luz MD  [address] [address]    Patient: Arlene Farrell   YOB: 1945   Date of Visit: 1/16/2024     Dear Abdi Luz MD:       Thank you for referring Arlene Farrell to me for evaluation. Below are the relevant portions of my assessment and plan of care.    If you have questions, please do not hesitate to call me. I look forward to following Arlene along with you.         Sincerely,        Franki Deluca MD        CC: MD Yosi Irving Sebastian, MD  01/23/24 0919  Sign when Signing Visit  1/23/2024    Seen on 1/16/2024    Chief Complaint     Dyspnea, evaluation of aortic stenosis    History of Present Illness:       Dear Miki and Colleagues,  It was nice to see Arlene Farrell in consultation at your request. She is a 78 y.o. female with hypertension, hyperlipidemia, obesity, status post breast lumpectomy who has become increasingly short of breath and also has had high blood pressures.  She fatigues easily. She denies syncope, TIA, orthopnea, PND or lower extremity edema.  She does not smoke, she does not have diabetes or kidney disease.  She was found to have a murmur and she had an echocardiogram that showed severe aortic stenosis with an ejection fraction of 50%, and mean gradient of 37 mmHg, aortic valve area 1.8 cm² and a peak velocity of 5 m/s.  He had a cardiac cath that showed significant LAD stenosis that required PCI.  The rest of the concept nonocclusive coronary artery disease.  She denies a history of rheumatic or scarlet fever, aneurysms, dissections or connective tissue disorders.  She lives by herself with cats.  She had a CT scan of the chest that showed enlargement of the ascending aorta 44 mm.  There is a 17 mm spiculated area of density that appears to be a surgical scar in the upper left breast.  There is a hiatal hernia.       Patient Active Problem List   Diagnosis   • Nonrheumatic aortic valve stenosis   •  EATON (dyspnea on exertion)   • Coronary artery disease involving native coronary artery of native heart with angina pectoris   • Aortic stenosis   • Hypertension   • HLD (hyperlipidemia)       Past Medical History:   Diagnosis Date   • Angina pectoris    • Dyspnea on exertion    • Heart valve disease    • HLD (hyperlipidemia) 12/22/2023   • Hypertension 12/22/2023       Past Surgical History:   Procedure Laterality Date   • BREAST LUMPECTOMY Left    • CARDIAC CATHETERIZATION N/A 12/14/2023    Procedure: Left Heart Cath;  Surgeon: Vinod Gerber MD;  Location:  [facility];  Service: Cardiovascular;  Laterality: N/A;   • CARDIAC CATHETERIZATION N/A 12/14/2023    Procedure: Percutaneous Coronary Intervention;  Surgeon: Vinod Gerber MD;  Location:  [facility]VAS[facility];  Service: Cardiovascular;  Laterality: N/A;   • CARDIAC CATHETERIZATION N/A 12/14/2023    Procedure: Stent AGUEDA coronary;  Surgeon: Vinod Gerber MD;  Location:  [facility];  Service: Cardiovascular;  Laterality: N/A;   • CARDIAC CATHETERIZATION N/A 12/14/2023    Procedure: Coronary angiography;  Surgeon: Vinod Gerber MD;  Location:  [facility];  Service: Cardiovascular;  Laterality: N/A;   • ORIF ANKLE FRACTURE Right        No Known Allergies      Current Outpatient Medications:   •  aspirin 81 MG EC tablet, Take 1 tablet by mouth Daily., Disp: 90 tablet, Rfl: 0  •  atorvastatin (LIPITOR) 40 MG tablet, Take 1 tablet by mouth Every Night., Disp: 90 tablet, Rfl: 0  •  clopidogrel (PLAVIX) 75 MG tablet, Take 1 tablet by mouth Daily., Disp: 90 tablet, Rfl: 3  •  hydroCHLOROthiazide (HYDRODIURIL) 25 MG tablet, Take 1 tablet by mouth Daily As Needed (swelling)., Disp: , Rfl:   •  lisinopril (PRINIVIL,ZESTRIL) 40 MG tablet, Take 1 tablet by mouth Daily., Disp: , Rfl:   •  metoprolol succinate XL (TOPROL-XL) 25 MG 24 hr tablet, Take 1 tablet by mouth Daily., Disp: 90 tablet, Rfl: 0  •   nitroglycerin (NITROSTAT) 0.4 MG SL tablet, Place 1 tablet under the tongue Every 5 (Five) Minutes As Needed for Chest Pain (Systolic BP Greater Than 100). Take no more than 3 doses in 15 minutes., Disp: 50 tablet, Rfl: 12    Social History     Socioeconomic History   • Marital status:    Tobacco Use   • Smoking status: Never   • Smokeless tobacco: Never   Vaping Use   • Vaping Use: Never used   Substance and Sexual Activity   • Alcohol use: Yes     Comment: 'seldom'   • Drug use: Not Currently   • Sexual activity: Defer       No family history on file.        Review of Systems:  As HPI, otherwise noncontributory    Physical Exam:    Vital Signs:  Weight: 108 kg (238 lb)   Body mass index is 39 kg/m².  Temp: 97.5 °F (36.4 °C)   Heart Rate: 94   BP: 157/98     Constitutional:       Appearance: Well-developed.   Eyes:      Conjunctiva/sclera: Conjunctivae normal.      Pupils: Pupils are equal, round, and reactive to light.   HENT:      Head: Normocephalic and atraumatic.      Nose: Nose normal.   Neck:      Thyroid: No thyromegaly.      Vascular: No JVD.      Lymphadenopathy: No cervical adenopathy.   Pulmonary:      Effort: Pulmonary effort is normal.      Breath sounds: Normal breath sounds. No rales.   Cardiovascular:      Normal rate. Regular rhythm.      Murmurs: There is a grade 4/6 harsh midsystolic murmur at the URSB, radiating to the neck.      No gallop.    Pulses:     Intact distal pulses. No decreased pulses.   Edema:     Peripheral edema absent.   Abdominal:      General: Bowel sounds are normal. There is no distension.      Palpations: Abdomen is soft. There is no abdominal mass.      Tenderness: There is no abdominal tenderness.   Musculoskeletal: Normal range of motion.         General: No tenderness or deformity.      Cervical back: Normal range of motion and neck supple. Skin:     General: Skin is warm and dry.      Findings: No erythema or rash.   Neurological:      Mental Status: Alert and  oriented to person, place, and time.      Deep Tendon Reflexes: Reflexes are normal and symmetric.   Psychiatric:         Behavior: Behavior normal.          Assessment:     Problems Addressed this Visit          Cardiac and Vasculature    Nonrheumatic aortic valve stenosis - Primary    EATON (dyspnea on exertion)    Coronary artery disease involving native coronary artery of native heart with angina pectoris    Hypertension    HLD (hyperlipidemia)     Diagnoses         Codes Comments    Nonrheumatic aortic valve stenosis    -  Primary ICD-10-CM: I35.0  ICD-9-CM: 424.1     EATON (dyspnea on exertion)     ICD-10-CM: R06.09  ICD-9-CM: 786.09     Coronary artery disease involving native coronary artery of native heart with angina pectoris     ICD-10-CM: I25.119  ICD-9-CM: 414.01, 413.9     Primary hypertension     ICD-10-CM: I10  ICD-9-CM: 401.9     Hyperlipidemia, unspecified hyperlipidemia type     ICD-10-CM: E78.5  ICD-9-CM: 272.4             Assessment/recommendation:     Severe nonrheumatic aortic stenosis with progressive heart failure symptoms class II-3.  She also has coronary artery disease and she is status post PCI of the LAD.  She has multiple comorbidities and limited social support.  She has a low risk STS AVR but in her case I will recommend TAVR over SAVR for faster recovery.  I recommend TAVR to prolong survival, symptomatic relief and to prevent adverse events.  I discussed the risk, benefits and terms of the procedure and she wishes to proceed.  She needs TAVR CTA and complete workup to address vascular access and implant ability.  I discussed with the patient a surgical rescue if need be.  She has agreed.    Hypertension, borderline control.  Blood pressures 157 mmHg.  I discussed with the patient importance of blood pressure control and she may need adjustment of the medications      Thank you for allowing me to participate in her care.    Regards,    Franki Deluca M.D.    I spent over 45 minutes  before, during and after the office visit and reviewing the records, examining the patient, reviewing interpreting myself the echocardiogram, the cardiac cath and chest CT, spent time in discussing the findings and options with the patient and the treatment options, discussing the plan of follow-up and workup for the recommended TAVR procedure and spent time in documenting in the electronic record

## 2024-01-18 DIAGNOSIS — I25.119 CORONARY ARTERY DISEASE INVOLVING NATIVE CORONARY ARTERY OF NATIVE HEART WITH ANGINA PECTORIS: Primary | ICD-10-CM

## 2024-01-18 DIAGNOSIS — I35.0 NONRHEUMATIC AORTIC VALVE STENOSIS: ICD-10-CM

## 2024-01-22 ENCOUNTER — TELEPHONE (OUTPATIENT)
Dept: CARDIOLOGY | Facility: HOSPITAL | Age: 79
End: 2024-01-22
Payer: MEDICARE

## 2024-01-22 NOTE — TELEPHONE ENCOUNTER
After speaking with Dr Gerber I called and spoke with Dr García's office. Ms Farrell can be seen Wed 1/24/23 at 1:30 for an evaluation regarding the CTA completed 1/7/24 We will wait on his recommendation I left a message asking her to call me to discuss

## 2024-01-23 NOTE — PROGRESS NOTES
1/23/2024    Seen on 1/16/2024    Chief Complaint     Dyspnea, evaluation of aortic stenosis    History of Present Illness:       Dear Miki and Colleagues,  It was nice to see Arlene Farrell in consultation at your request. She is a 78 y.o. female with hypertension, hyperlipidemia, obesity, status post breast lumpectomy who has become increasingly short of breath and also has had high blood pressures.  She fatigues easily. She denies syncope, TIA, orthopnea, PND or lower extremity edema.  She does not smoke, she does not have diabetes or kidney disease.  She was found to have a murmur and she had an echocardiogram that showed severe aortic stenosis with an ejection fraction of 50%, and mean gradient of 37 mmHg, aortic valve area 1.8 cm² and a peak velocity of 5 m/s.  He had a cardiac cath that showed significant LAD stenosis that required PCI.  The rest of the concept nonocclusive coronary artery disease.  She denies a history of rheumatic or scarlet fever, aneurysms, dissections or connective tissue disorders.  She lives by herself with cats.  She had a CT scan of the chest that showed enlargement of the ascending aorta 44 mm.  There is a 17 mm spiculated area of density that appears to be a surgical scar in the upper left breast.  There is a hiatal hernia.       Patient Active Problem List   Diagnosis    Nonrheumatic aortic valve stenosis    EATON (dyspnea on exertion)    Coronary artery disease involving native coronary artery of native heart with angina pectoris    Aortic stenosis    Hypertension    HLD (hyperlipidemia)       Past Medical History:   Diagnosis Date    Angina pectoris     Dyspnea on exertion     Heart valve disease     HLD (hyperlipidemia) 12/22/2023    Hypertension 12/22/2023       Past Surgical History:   Procedure Laterality Date    BREAST LUMPECTOMY Left     CARDIAC CATHETERIZATION N/A 12/14/2023    Procedure: Left Heart Cath;  Surgeon: Vinod Gerber MD;  Location: [facility] INVASIVE  LOCATION;  Service: Cardiovascular;  Laterality: N/A;    CARDIAC CATHETERIZATION N/A 12/14/2023    Procedure: Percutaneous Coronary Intervention;  Surgeon: Vinod Gerber MD;  Location:  [facility] CATH INVASIVE LOCATION;  Service: Cardiovascular;  Laterality: N/A;    CARDIAC CATHETERIZATION N/A 12/14/2023    Procedure: Stent AGUEDA coronary;  Surgeon: Vinod Gerber MD;  Location:  [facility] C[facility] INVASIVE LOCATION;  Service: Cardiovascular;  Laterality: N/A;    CARDIAC CATHETERIZATION N/A 12/14/2023    Procedure: Coronary angiography;  Surgeon: Vinod Gerber MD;  Location: [facility] CATH INVASIVE LOCATION;  Service: Cardiovascular;  Laterality: N/A;    ORIF ANKLE FRACTURE Right        No Known Allergies      Current Outpatient Medications:     aspirin 81 MG EC tablet, Take 1 tablet by mouth Daily., Disp: 90 tablet, Rfl: 0    atorvastatin (LIPITOR) 40 MG tablet, Take 1 tablet by mouth Every Night., Disp: 90 tablet, Rfl: 0    clopidogrel (PLAVIX) 75 MG tablet, Take 1 tablet by mouth Daily., Disp: 90 tablet, Rfl: 3    hydroCHLOROthiazide (HYDRODIURIL) 25 MG tablet, Take 1 tablet by mouth Daily As Needed (swelling)., Disp: , Rfl:     lisinopril (PRINIVIL,ZESTRIL) 40 MG tablet, Take 1 tablet by mouth Daily., Disp: , Rfl:     metoprolol succinate XL (TOPROL-XL) 25 MG 24 hr tablet, Take 1 tablet by mouth Daily., Disp: 90 tablet, Rfl: 0    nitroglycerin (NITROSTAT) 0.4 MG SL tablet, Place 1 tablet under the tongue Every 5 (Five) Minutes As Needed for Chest Pain (Systolic BP Greater Than 100). Take no more than 3 doses in 15 minutes., Disp: 50 tablet, Rfl: 12    Social History     Socioeconomic History    Marital status:    Tobacco Use    Smoking status: Never    Smokeless tobacco: Never   Vaping Use    Vaping Use: Never used   Substance and Sexual Activity    Alcohol use: Yes     Comment: 'seldom'    Drug use: Not Currently    Sexual activity: Defer       No family history on file.        Review of Systems:  As HPI,  otherwise noncontributory    Physical Exam:    Vital Signs:  Weight: 108 kg (238 lb)   Body mass index is 39 kg/m².  Temp: 97.5 °F (36.4 °C)   Heart Rate: 94   BP: 157/98     Constitutional:       Appearance: Well-developed.   Eyes:      Conjunctiva/sclera: Conjunctivae normal.      Pupils: Pupils are equal, round, and reactive to light.   HENT:      Head: Normocephalic and atraumatic.      Nose: Nose normal.   Neck:      Thyroid: No thyromegaly.      Vascular: No JVD.      Lymphadenopathy: No cervical adenopathy.   Pulmonary:      Effort: Pulmonary effort is normal.      Breath sounds: Normal breath sounds. No rales.   Cardiovascular:      Normal rate. Regular rhythm.      Murmurs: There is a grade 4/6 harsh midsystolic murmur at the URSB, radiating to the neck.      No gallop.    Pulses:     Intact distal pulses. No decreased pulses.   Edema:     Peripheral edema absent.   Abdominal:      General: Bowel sounds are normal. There is no distension.      Palpations: Abdomen is soft. There is no abdominal mass.      Tenderness: There is no abdominal tenderness.   Musculoskeletal: Normal range of motion.         General: No tenderness or deformity.      Cervical back: Normal range of motion and neck supple. Skin:     General: Skin is warm and dry.      Findings: No erythema or rash.   Neurological:      Mental Status: Alert and oriented to person, place, and time.      Deep Tendon Reflexes: Reflexes are normal and symmetric.   Psychiatric:         Behavior: Behavior normal.          Assessment:     Problems Addressed this Visit          Cardiac and Vasculature    Nonrheumatic aortic valve stenosis - Primary    EATON (dyspnea on exertion)    Coronary artery disease involving native coronary artery of native heart with angina pectoris    Hypertension    HLD (hyperlipidemia)     Diagnoses         Codes Comments    Nonrheumatic aortic valve stenosis    -  Primary ICD-10-CM: I35.0  ICD-9-CM: 424.1     EATON (dyspnea on exertion)      ICD-10-CM: R06.09  ICD-9-CM: 786.09     Coronary artery disease involving native coronary artery of native heart with angina pectoris     ICD-10-CM: I25.119  ICD-9-CM: 414.01, 413.9     Primary hypertension     ICD-10-CM: I10  ICD-9-CM: 401.9     Hyperlipidemia, unspecified hyperlipidemia type     ICD-10-CM: E78.5  ICD-9-CM: 272.4             Assessment/recommendation:     Severe nonrheumatic aortic stenosis with progressive heart failure symptoms class II-3.  She also has coronary artery disease and she is status post PCI of the LAD.  She has multiple comorbidities and limited social support.  She has a low risk STS AVR but in her case I will recommend TAVR over SAVR for faster recovery.  I recommend TAVR to prolong survival, symptomatic relief and to prevent adverse events.  I discussed the risk, benefits and terms of the procedure and she wishes to proceed.  She needs TAVR CTA and complete workup to address vascular access and implant ability.  I discussed with the patient a surgical rescue if need be.  She has agreed.    Hypertension, borderline control.  Blood pressures 157 mmHg.  I discussed with the patient importance of blood pressure control and she may need adjustment of the medications      Thank you for allowing me to participate in her care.    Regards,    Franki Deluca M.D.    I spent over 45 minutes before, during and after the office visit and reviewing the records, examining the patient, reviewing interpreting myself the echocardiogram, the cardiac cath and chest CT, spent time in discussing the findings and options with the patient and the treatment options, discussing the plan of follow-up and workup for the recommended TAVR procedure and spent time in documenting in the electronic record

## 2024-01-29 ENCOUNTER — PREP FOR SURGERY (OUTPATIENT)
Dept: OTHER | Facility: HOSPITAL | Age: 79
End: 2024-01-29
Payer: MEDICARE

## 2024-01-29 DIAGNOSIS — I50.32 CHRONIC DIASTOLIC (CONGESTIVE) HEART FAILURE: ICD-10-CM

## 2024-01-29 DIAGNOSIS — R79.1 ABNORMAL COAGULATION PROFILE: ICD-10-CM

## 2024-01-29 DIAGNOSIS — R79.9 ABNORMAL FINDING OF BLOOD CHEMISTRY, UNSPECIFIED: ICD-10-CM

## 2024-01-29 DIAGNOSIS — I35.0 NONRHEUMATIC AORTIC VALVE STENOSIS: Primary | ICD-10-CM

## 2024-01-29 RX ORDER — CEFAZOLIN SODIUM 2 G/100ML
2000 INJECTION, SOLUTION INTRAVENOUS ONCE
OUTPATIENT
Start: 2024-01-29 | End: 2024-01-29

## 2024-01-29 RX ORDER — CHLORHEXIDINE GLUCONATE ORAL RINSE 1.2 MG/ML
15 SOLUTION DENTAL ONCE
OUTPATIENT
Start: 2024-01-29 | End: 2024-01-29

## 2024-01-29 RX ORDER — CHLORHEXIDINE GLUCONATE ORAL RINSE 1.2 MG/ML
15 SOLUTION DENTAL EVERY 12 HOURS
Status: CANCELLED | OUTPATIENT
Start: 2024-01-29 | End: 2024-01-30

## 2024-02-02 ENCOUNTER — PRE-ADMISSION TESTING (OUTPATIENT)
Dept: PREADMISSION TESTING | Facility: HOSPITAL | Age: 79
End: 2024-02-02
Payer: MEDICARE

## 2024-02-02 ENCOUNTER — DOCUMENTATION (OUTPATIENT)
Dept: CARDIOLOGY | Facility: HOSPITAL | Age: 79
End: 2024-02-02
Payer: MEDICARE

## 2024-02-02 ENCOUNTER — HOSPITAL ENCOUNTER (OUTPATIENT)
Dept: GENERAL RADIOLOGY | Facility: HOSPITAL | Age: 79
Discharge: HOME | End: 2024-02-02
Payer: MEDICARE

## 2024-02-02 VITALS
HEART RATE: 91 BPM | DIASTOLIC BLOOD PRESSURE: 88 MMHG | WEIGHT: 227.5 LBS | RESPIRATION RATE: 20 BRPM | SYSTOLIC BLOOD PRESSURE: 133 MMHG | HEIGHT: 62 IN | TEMPERATURE: 98.3 F | BODY MASS INDEX: 41.86 KG/M2 | OXYGEN SATURATION: 97 %

## 2024-02-02 DIAGNOSIS — I35.0 NONRHEUMATIC AORTIC VALVE STENOSIS: ICD-10-CM

## 2024-02-02 LAB
ABO GROUP BLD: NORMAL
ALBUMIN SERPL-MCNC: 3.9 G/DL (ref 3.5–5.2)
ALBUMIN/GLOB SERPL: 1.2 G/DL
ALP SERPL-CCNC: 90 U/L (ref 39–117)
ALT SERPL W P-5'-P-CCNC: 9 U/L (ref 1–33)
ANION GAP SERPL CALCULATED.3IONS-SCNC: 10.9 MMOL/L (ref 5–15)
APTT PPP: 28 SECONDS (ref 22.7–35.4)
AST SERPL-CCNC: 10 U/L (ref 1–32)
BACTERIA UR QL AUTO: ABNORMAL /HPF
BASOPHILS # BLD AUTO: 0.05 10*3/MM3 (ref 0–0.2)
BASOPHILS NFR BLD AUTO: 0.6 % (ref 0–1.5)
BILIRUB SERPL-MCNC: 0.4 MG/DL (ref 0–1.2)
BILIRUB UR QL STRIP: NEGATIVE
BLD GP AB SCN SERPL QL: NEGATIVE
BUN SERPL-MCNC: 23 MG/DL (ref 8–23)
BUN/CREAT SERPL: 28.4 (ref 7–25)
CALCIUM SPEC-SCNC: 9.3 MG/DL (ref 8.6–10.5)
CHLORIDE SERPL-SCNC: 105 MMOL/L (ref 98–107)
CLARITY UR: CLEAR
CLOSE TME COLL+ADP + EPINEP PNL BLD: 41 % (ref 86–100)
CO2 SERPL-SCNC: 23.1 MMOL/L (ref 22–29)
COLOR UR: YELLOW
CREAT SERPL-MCNC: 0.81 MG/DL (ref 0.57–1)
DEPRECATED RDW RBC AUTO: 39.5 FL (ref 37–54)
EGFRCR SERPLBLD CKD-EPI 2021: 74.9 ML/MIN/1.73
EOSINOPHIL # BLD AUTO: 0.11 10*3/MM3 (ref 0–0.4)
EOSINOPHIL NFR BLD AUTO: 1.4 % (ref 0.3–6.2)
ERYTHROCYTE [DISTWIDTH] IN BLOOD BY AUTOMATED COUNT: 13.8 % (ref 12.3–15.4)
GLOBULIN UR ELPH-MCNC: 3.2 GM/DL
GLUCOSE SERPL-MCNC: 106 MG/DL (ref 65–99)
GLUCOSE UR STRIP-MCNC: NEGATIVE MG/DL
HBA1C MFR BLD: 5.7 % (ref 4.8–5.6)
HCT VFR BLD AUTO: 30.8 % (ref 34–46.6)
HGB BLD-MCNC: 9.6 G/DL (ref 12–15.9)
HGB UR QL STRIP.AUTO: NEGATIVE
HYALINE CASTS UR QL AUTO: ABNORMAL /LPF
IMM GRANULOCYTES # BLD AUTO: 0.02 10*3/MM3 (ref 0–0.05)
IMM GRANULOCYTES NFR BLD AUTO: 0.3 % (ref 0–0.5)
INR PPP: 1.03 (ref 0.9–1.1)
KETONES UR QL STRIP: ABNORMAL
LEUKOCYTE ESTERASE UR QL STRIP.AUTO: ABNORMAL
LYMPHOCYTES # BLD AUTO: 1.34 10*3/MM3 (ref 0.7–3.1)
LYMPHOCYTES NFR BLD AUTO: 17.4 % (ref 19.6–45.3)
MCH RBC QN AUTO: 24.6 PG (ref 26.6–33)
MCHC RBC AUTO-ENTMCNC: 31.2 G/DL (ref 31.5–35.7)
MCV RBC AUTO: 78.8 FL (ref 79–97)
MONOCYTES # BLD AUTO: 0.71 10*3/MM3 (ref 0.1–0.9)
MONOCYTES NFR BLD AUTO: 9.2 % (ref 5–12)
NEUTROPHILS NFR BLD AUTO: 5.49 10*3/MM3 (ref 1.7–7)
NEUTROPHILS NFR BLD AUTO: 71.1 % (ref 42.7–76)
NITRITE UR QL STRIP: NEGATIVE
NRBC BLD AUTO-RTO: 0 /100 WBC (ref 0–0.2)
NT-PROBNP SERPL-MCNC: 1118 PG/ML (ref 0–1800)
PH UR STRIP.AUTO: 5.5 [PH] (ref 5–8)
PLATELET # BLD AUTO: 258 10*3/MM3 (ref 140–450)
PMV BLD AUTO: 9.4 FL (ref 6–12)
POTASSIUM SERPL-SCNC: 4 MMOL/L (ref 3.5–5.2)
PROT SERPL-MCNC: 7.1 G/DL (ref 6–8.5)
PROT UR QL STRIP: NEGATIVE
PROTHROMBIN TIME: 13.6 SECONDS (ref 11.7–14.2)
QT INTERVAL: 379 MS
QTC INTERVAL: 464 MS
RBC # BLD AUTO: 3.91 10*6/MM3 (ref 3.77–5.28)
RBC # UR STRIP: ABNORMAL /HPF
REF LAB TEST METHOD: ABNORMAL
RH BLD: POSITIVE
SARS-COV-2 RNA RESP QL NAA+PROBE: NOT DETECTED
SODIUM SERPL-SCNC: 139 MMOL/L (ref 136–145)
SP GR UR STRIP: 1.02 (ref 1–1.03)
SQUAMOUS #/AREA URNS HPF: ABNORMAL /HPF
T&S EXPIRATION DATE: NORMAL
UROBILINOGEN UR QL STRIP: ABNORMAL
WBC # UR STRIP: ABNORMAL /HPF
WBC NRBC COR # BLD AUTO: 7.72 10*3/MM3 (ref 3.4–10.8)

## 2024-02-02 PROCEDURE — 85730 THROMBOPLASTIN TIME PARTIAL: CPT | Performed by: NURSE PRACTITIONER

## 2024-02-02 PROCEDURE — 83036 HEMOGLOBIN GLYCOSYLATED A1C: CPT | Performed by: NURSE PRACTITIONER

## 2024-02-02 PROCEDURE — 87635 SARS-COV-2 COVID-19 AMP PRB: CPT

## 2024-02-02 PROCEDURE — 93005 ELECTROCARDIOGRAM TRACING: CPT

## 2024-02-02 PROCEDURE — 83880 ASSAY OF NATRIURETIC PEPTIDE: CPT | Performed by: NURSE PRACTITIONER

## 2024-02-02 PROCEDURE — 87086 URINE CULTURE/COLONY COUNT: CPT | Performed by: NURSE PRACTITIONER

## 2024-02-02 PROCEDURE — 86850 RBC ANTIBODY SCREEN: CPT | Performed by: NURSE PRACTITIONER

## 2024-02-02 PROCEDURE — 86901 BLOOD TYPING SEROLOGIC RH(D): CPT | Performed by: NURSE PRACTITIONER

## 2024-02-02 PROCEDURE — 71046 X-RAY EXAM CHEST 2 VIEWS: CPT

## 2024-02-02 PROCEDURE — 80053 COMPREHEN METABOLIC PANEL: CPT | Performed by: NURSE PRACTITIONER

## 2024-02-02 PROCEDURE — 81001 URINALYSIS AUTO W/SCOPE: CPT | Performed by: NURSE PRACTITIONER

## 2024-02-02 PROCEDURE — 85610 PROTHROMBIN TIME: CPT | Performed by: NURSE PRACTITIONER

## 2024-02-02 PROCEDURE — 86900 BLOOD TYPING SEROLOGIC ABO: CPT | Performed by: NURSE PRACTITIONER

## 2024-02-02 PROCEDURE — 85025 COMPLETE CBC W/AUTO DIFF WBC: CPT | Performed by: NURSE PRACTITIONER

## 2024-02-02 PROCEDURE — 87077 CULTURE AEROBIC IDENTIFY: CPT | Performed by: NURSE PRACTITIONER

## 2024-02-02 PROCEDURE — 87186 SC STD MICRODIL/AGAR DIL: CPT | Performed by: NURSE PRACTITIONER

## 2024-02-02 PROCEDURE — 93010 ELECTROCARDIOGRAM REPORT: CPT | Performed by: INTERNAL MEDICINE

## 2024-02-02 PROCEDURE — 85576 BLOOD PLATELET AGGREGATION: CPT | Performed by: NURSE PRACTITIONER

## 2024-02-02 RX ORDER — CHLORHEXIDINE GLUCONATE ORAL RINSE 1.2 MG/ML
15 SOLUTION DENTAL EVERY 12 HOURS
Qty: 60 ML | Refills: 0 | Status: DISPENSED | OUTPATIENT
Start: 2024-02-02 | End: 2024-02-03

## 2024-02-02 RX ORDER — CEPHALEXIN 500 MG/1
500 CAPSULE ORAL 3 TIMES DAILY
Qty: 15 CAPSULE | Refills: 0 | Status: SHIPPED | OUTPATIENT
Start: 2024-02-02 | End: 2024-02-07

## 2024-02-02 NOTE — DISCHARGE INSTRUCTIONS
Take the following medications the morning of surgery with a small sip of water:  AS DIRECTED PER CARDIAC NURSE    ARRIVAL TIME 6:00 AM    If you are on prescription narcotic pain medication to control your pain you may also take that medication the morning of surgery.    General Instructions:  Do not eat or drink anything after midnight the night before surgery.  Infants may have breast milk up to four hours before surgery.  Infants drinking formula may drink formula up to six hours before surgery.   Patients who avoid smoking, chewing tobacco and alcohol for 4 weeks prior to surgery have a reduced risk of post-operative complications.  Quit smoking as many days before surgery as you can.  Do not smoke, use chewing tobacco or drink alcohol the day of surgery.   If applicable bring your C-PAP/ BI-PAP machine in with you to preop day of surgery.  Bring any papers given to you in the doctor’s office.  Wear clean comfortable clothes.  Do not wear contact lenses, false eyelashes or make-up.  Bring a case for your glasses.   Bring crutches or walker if applicable.  Remove all piercings.  Leave jewelry and any other valuables at home.  Hair extensions with metal clips must be removed prior to surgery.  The Pre-Admission Testing nurse will instruct you to bring medications if unable to obtain an accurate list in Pre-Admission Testing.        If you were given a blood bank ID arm band remember to bring it with you the day of surgery.    Preventing a Surgical Site Infection:  For 2 to 3 days before surgery, avoid shaving with a razor because the razor can irritate skin and make it easier to develop an infection.    Any areas of open skin can increase the risk of a post-operative wound infection by allowing bacteria to enter and travel throughout the body.  Notify your surgeon if you have any skin wounds / rashes even if it is not near the expected surgical site.  The area will need assessed to determine if surgery should be  delayed until it is healed.  The night prior to surgery shower using a fresh bar of anti-bacterial soap (such as Dial) and clean washcloth.  Sleep in a clean bed with clean clothing.  Do not allow pets to sleep with you.  Shower on the morning of surgery using a fresh bar of anti-bacterial soap (such as Dial) and clean washcloth.  Dry with a clean towel and dress in clean clothing.  Ask your surgeon if you will be receiving antibiotics prior to surgery.  Make sure you, your family, and all healthcare providers clean their hands with soap and water or an alcohol based hand  before caring for you or your wound.    Day of surgery:  Your arrival time is approximately two hours before your scheduled surgery time.  Upon arrival, a Pre-op nurse and Anesthesiologist will review your health history, obtain vital signs, and answer questions you may have.  The only belongings needed at this time will be your home medications and if applicable your C-PAP/BI-PAP machine.  A Pre-op nurse will start an IV and you may receive medication in preparation for surgery, including something to help you relax.      Please be aware that surgery does come with discomfort.  We want to make every effort to control your discomfort so please discuss any uncontrolled symptoms with your nurse.   Your doctor will most likely have prescribed pain medications.      If you are going home after surgery you will receive individualized written care instructions before being discharged.  A responsible adult must drive you to and from the hospital on the day of your surgery and ideally stay with you through the night.  Discharge prescriptions can be filled by the hospital pharmacy during regular pharmacy hours.  If you are having surgery late in the day/evening your prescription may be e-prescribed to your pharmacy.  Please verify your pharmacy hours or chose a 24 hour pharmacy to avoid not having access to your prescription because your pharmacy  has closed for the day.    If you are staying overnight following surgery, you will be transported to your hospital room following the recovery period.  [facility] has all private rooms.    If you have any questions please call Pre-Admission Testing at [phone].  Deductibles and co-payments are collected on the day of service. Please be prepared to pay the required co-pay, deductible or deposit on the day of service as defined by your plan.    Call your surgeon immediately if you experience any of the following symptoms:  Sore Throat  Shortness of Breath or difficulty breathing  Cough  Chills  Body soreness or muscle pain  Headache  Fever  New loss of taste or smell  Do not arrive for your surgery ill.  Your procedure will need to be rescheduled to another time.  You will need to call your physician before the day of surgery to avoid any unnecessary exposure to hospital staff as well as other patients.       BACTROBAN NASAL OINTMENT  There are many germs normally in your nose. Bactroban is an ointment that will help reduce these germs. Please follow these instructions for Bactroban use:        ____The day before surgery in the evening              Date__2/6______    ____The day of surgery in the morning    Date__2/6______    **Squirt ½ package of Bactroban Ointment onto a cotton applicator and apply to inside of 1st nostril.  Squirt the remaining Bactroban and apply to the inside of the other nostril.    PERIDEX- ORAL:  Use only if your surgeon has ordered  Use the night before and morning of surgery - Swish, gargle, and spit - do not swallow.

## 2024-02-02 NOTE — PAT
I met with Ms Farrell & her friend (Xochilt) in [facility] for scheduled TAVR 2/6/24. She has been provided pre-op instructions as well as nasal & oral meds for TAVR prep. We discussed what to expect during her hospital stay & I was able to answer her questions. She currently lives at [facility] and is independent of her care. She ambulates without assistive devices & she does not use supplemental oxygen. She denies any heart failure hospitalizations within the past year. She denies any skin rashes, irritations or signs of infections; specifically in groin area/access site. Pt denies any pain or discomfort with urination, fevers/chills, or urinary frequency. Labs reviewed with Jo SARAH, Rx to be called to pharmacy for abn u/a. Pt is aware & has contact info to coordinators' office if she has questions/concerns. RTRN

## 2024-02-04 LAB — BACTERIA SPEC AEROBE CULT: ABNORMAL

## 2024-02-06 ENCOUNTER — ANCILLARY PROCEDURE (OUTPATIENT)
Dept: PERIOP | Facility: HOSPITAL | Age: 79
DRG: 267 | End: 2024-02-06
Payer: MEDICARE

## 2024-02-06 ENCOUNTER — HOSPITAL ENCOUNTER (INPATIENT)
Facility: HOSPITAL | Age: 79
LOS: 1 days | Discharge: HOME | DRG: 267 | End: 2024-02-07
Attending: THORACIC SURGERY (CARDIOTHORACIC VASCULAR SURGERY) | Admitting: THORACIC SURGERY (CARDIOTHORACIC VASCULAR SURGERY)
Payer: MEDICARE

## 2024-02-06 ENCOUNTER — ANESTHESIA (OUTPATIENT)
Dept: PERIOP | Facility: HOSPITAL | Age: 79
End: 2024-02-06
Payer: MEDICARE

## 2024-02-06 ENCOUNTER — ANESTHESIA EVENT (OUTPATIENT)
Dept: PERIOP | Facility: HOSPITAL | Age: 79
End: 2024-02-06
Payer: MEDICARE

## 2024-02-06 DIAGNOSIS — I25.119 CORONARY ARTERY DISEASE INVOLVING NATIVE CORONARY ARTERY OF NATIVE HEART WITH ANGINA PECTORIS: ICD-10-CM

## 2024-02-06 DIAGNOSIS — Z95.2 S/P TAVR (TRANSCATHETER AORTIC VALVE REPLACEMENT): Primary | ICD-10-CM

## 2024-02-06 DIAGNOSIS — I35.0 AORTIC STENOSIS, SEVERE: Primary | ICD-10-CM

## 2024-02-06 DIAGNOSIS — R79.9 ABNORMAL FINDING OF BLOOD CHEMISTRY, UNSPECIFIED: ICD-10-CM

## 2024-02-06 DIAGNOSIS — I50.32 CHRONIC DIASTOLIC (CONGESTIVE) HEART FAILURE: ICD-10-CM

## 2024-02-06 DIAGNOSIS — I35.0 NONRHEUMATIC AORTIC VALVE STENOSIS: ICD-10-CM

## 2024-02-06 DIAGNOSIS — R79.1 ABNORMAL COAGULATION PROFILE: ICD-10-CM

## 2024-02-06 LAB
ACT BLD: 163 SECONDS (ref 82–152)
ACT BLD: 179 SECONDS (ref 82–152)
ACT BLD: 320 SECONDS (ref 82–152)
BASE EXCESS BLDA CALC-SCNC: -2 MMOL/L (ref -5–5)
BASE EXCESS BLDA CALC-SCNC: -3 MMOL/L (ref -5–5)
CA-I BLDA-SCNC: ABNORMAL MMOL/L
CA-I BLDA-SCNC: ABNORMAL MMOL/L
CO2 BLDA-SCNC: 24 MMOL/L (ref 24–29)
CO2 BLDA-SCNC: 25 MMOL/L (ref 24–29)
GLUCOSE BLDC GLUCOMTR-MCNC: 105 MG/DL (ref 70–130)
GLUCOSE BLDC GLUCOMTR-MCNC: 123 MG/DL (ref 70–130)
HCO3 BLDA-SCNC: 23 MMOL/L (ref 22–26)
HCO3 BLDA-SCNC: 23.3 MMOL/L (ref 22–26)
HCT VFR BLDA CALC: 23 % (ref 38–51)
HCT VFR BLDA CALC: 26 % (ref 38–51)
HGB BLDA-MCNC: 7.8 G/DL (ref 12–17)
HGB BLDA-MCNC: 8.8 G/DL (ref 12–17)
PCO2 BLDA: 40 MM HG (ref 35–45)
PCO2 BLDA: 45.8 MM HG (ref 35–45)
PH BLDA: 7.33 PH UNITS (ref 7.35–7.6)
PH BLDA: 7.37 PH UNITS (ref 7.35–7.6)
PO2 BLDA: 64 MMHG (ref 80–105)
PO2 BLDA: 98 MMHG (ref 80–105)
POTASSIUM BLDA-SCNC: 3.5 MMOL/L (ref 3.5–4.9)
POTASSIUM BLDA-SCNC: 3.7 MMOL/L (ref 3.5–4.9)
QT INTERVAL: 442 MS
QTC INTERVAL: 474 MS
SAO2 % BLDA: 90 % (ref 95–98)
SAO2 % BLDA: 97 % (ref 95–98)

## 2024-02-06 PROCEDURE — 93010 ELECTROCARDIOGRAM REPORT: CPT | Performed by: INTERNAL MEDICINE

## 2024-02-06 PROCEDURE — C1894 INTRO/SHEATH, NON-LASER: HCPCS | Performed by: THORACIC SURGERY (CARDIOTHORACIC VASCULAR SURGERY)

## 2024-02-06 PROCEDURE — S0260 H&P FOR SURGERY: HCPCS | Performed by: INTERNAL MEDICINE

## 2024-02-06 PROCEDURE — 25810000003 SODIUM CHLORIDE 0.9 % SOLUTION: Performed by: INTERNAL MEDICINE

## 2024-02-06 PROCEDURE — C1769 GUIDE WIRE: HCPCS | Performed by: THORACIC SURGERY (CARDIOTHORACIC VASCULAR SURGERY)

## 2024-02-06 PROCEDURE — C1760 CLOSURE DEV, VASC: HCPCS | Performed by: THORACIC SURGERY (CARDIOTHORACIC VASCULAR SURGERY)

## 2024-02-06 PROCEDURE — 25010000002 FENTANYL CITRATE (PF) 50 MCG/ML SOLUTION: Performed by: STUDENT IN AN ORGANIZED HEALTH CARE EDUCATION/TRAINING PROGRAM

## 2024-02-06 PROCEDURE — 25010000002 CEFAZOLIN IN DEXTROSE 2-4 GM/100ML-% SOLUTION: Performed by: NURSE PRACTITIONER

## 2024-02-06 PROCEDURE — 25010000002 HEPARIN (PORCINE) PER 1000 UNITS: Performed by: ANESTHESIOLOGY

## 2024-02-06 PROCEDURE — 25010000002 PROPOFOL 10 MG/ML EMULSION: Performed by: ANESTHESIOLOGY

## 2024-02-06 PROCEDURE — 25010000002 HYDROMORPHONE PER 4 MG: Performed by: ANESTHESIOLOGY

## 2024-02-06 PROCEDURE — 93005 ELECTROCARDIOGRAM TRACING: CPT | Performed by: INTERNAL MEDICINE

## 2024-02-06 PROCEDURE — 33361 REPLACE AORTIC VALVE PERQ: CPT | Performed by: THORACIC SURGERY (CARDIOTHORACIC VASCULAR SURGERY)

## 2024-02-06 PROCEDURE — B41D1ZZ FLUOROSCOPY OF AORTA AND BILATERAL LOWER EXTREMITY ARTERIES USING LOW OSMOLAR CONTRAST: ICD-10-PCS | Performed by: THORACIC SURGERY (CARDIOTHORACIC VASCULAR SURGERY)

## 2024-02-06 PROCEDURE — 82947 ASSAY GLUCOSE BLOOD QUANT: CPT

## 2024-02-06 PROCEDURE — 25010000002 CEFAZOLIN IN DEXTROSE 2-4 GM/100ML-% SOLUTION: Performed by: ANESTHESIOLOGY

## 2024-02-06 PROCEDURE — 25510000001 IOPAMIDOL PER 1 ML: Performed by: THORACIC SURGERY (CARDIOTHORACIC VASCULAR SURGERY)

## 2024-02-06 PROCEDURE — 82803 BLOOD GASES ANY COMBINATION: CPT

## 2024-02-06 PROCEDURE — 33361 REPLACE AORTIC VALVE PERQ: CPT | Performed by: INTERNAL MEDICINE

## 2024-02-06 PROCEDURE — 93308 TTE F-UP OR LMTD: CPT

## 2024-02-06 PROCEDURE — 02RF38Z REPLACEMENT OF AORTIC VALVE WITH ZOOPLASTIC TISSUE, PERCUTANEOUS APPROACH: ICD-10-PCS | Performed by: THORACIC SURGERY (CARDIOTHORACIC VASCULAR SURGERY)

## 2024-02-06 PROCEDURE — 93325 DOPPLER ECHO COLOR FLOW MAPG: CPT

## 2024-02-06 PROCEDURE — 25010000002 MIDAZOLAM PER 1 MG: Performed by: STUDENT IN AN ORGANIZED HEALTH CARE EDUCATION/TRAINING PROGRAM

## 2024-02-06 PROCEDURE — C1889 IMPLANT/INSERT DEVICE, NOC: HCPCS | Performed by: THORACIC SURGERY (CARDIOTHORACIC VASCULAR SURGERY)

## 2024-02-06 PROCEDURE — 85014 HEMATOCRIT: CPT

## 2024-02-06 PROCEDURE — 25010000002 HYDRALAZINE PER 20 MG: Performed by: INTERNAL MEDICINE

## 2024-02-06 PROCEDURE — 25010000002 ONDANSETRON PER 1 MG: Performed by: ANESTHESIOLOGY

## 2024-02-06 PROCEDURE — 93321 DOPPLER ECHO F-UP/LMTD STD: CPT

## 2024-02-06 PROCEDURE — 25010000002 PROTAMINE SULFATE PER 10 MG: Performed by: ANESTHESIOLOGY

## 2024-02-06 PROCEDURE — 85347 COAGULATION TIME ACTIVATED: CPT

## 2024-02-06 PROCEDURE — 85018 HEMOGLOBIN: CPT

## 2024-02-06 PROCEDURE — 25010000002 PHENYLEPHRINE 10 MG/ML SOLUTION: Performed by: ANESTHESIOLOGY

## 2024-02-06 DEVICE — VLV HEART TRNSCATH SAPIEN/COMMANDER 26MM: Type: IMPLANTABLE DEVICE | Site: HEART | Status: FUNCTIONAL

## 2024-02-06 RX ORDER — FLUMAZENIL 0.1 MG/ML
0.2 INJECTION INTRAVENOUS AS NEEDED
Status: DISCONTINUED | OUTPATIENT
Start: 2024-02-06 | End: 2024-02-06 | Stop reason: HOSPADM

## 2024-02-06 RX ORDER — HYDRALAZINE HYDROCHLORIDE 20 MG/ML
5 INJECTION INTRAMUSCULAR; INTRAVENOUS
Status: DISCONTINUED | OUTPATIENT
Start: 2024-02-06 | End: 2024-02-06 | Stop reason: HOSPADM

## 2024-02-06 RX ORDER — PHENYLEPHRINE HYDROCHLORIDE 10 MG/ML
INJECTION INTRAVENOUS AS NEEDED
Status: DISCONTINUED | OUTPATIENT
Start: 2024-02-06 | End: 2024-02-06 | Stop reason: SURG

## 2024-02-06 RX ORDER — LIDOCAINE HYDROCHLORIDE 20 MG/ML
INJECTION, SOLUTION INFILTRATION; PERINEURAL AS NEEDED
Status: DISCONTINUED | OUTPATIENT
Start: 2024-02-06 | End: 2024-02-06 | Stop reason: HOSPADM

## 2024-02-06 RX ORDER — CLOPIDOGREL BISULFATE 75 MG/1
75 TABLET ORAL DAILY
Status: DISCONTINUED | OUTPATIENT
Start: 2024-02-06 | End: 2024-02-06

## 2024-02-06 RX ORDER — CEFAZOLIN SODIUM 2 G/100ML
2000 INJECTION, SOLUTION INTRAVENOUS ONCE
Status: COMPLETED | OUTPATIENT
Start: 2024-02-06 | End: 2024-02-06

## 2024-02-06 RX ORDER — METOPROLOL SUCCINATE 25 MG/1
25 TABLET, EXTENDED RELEASE ORAL DAILY
Status: DISCONTINUED | OUTPATIENT
Start: 2024-02-07 | End: 2024-02-07 | Stop reason: HOSPADM

## 2024-02-06 RX ORDER — PROMETHAZINE HYDROCHLORIDE 25 MG/1
25 TABLET ORAL ONCE AS NEEDED
Status: DISCONTINUED | OUTPATIENT
Start: 2024-02-06 | End: 2024-02-06 | Stop reason: HOSPADM

## 2024-02-06 RX ORDER — CEFAZOLIN SODIUM 2 G/100ML
INJECTION, SOLUTION INTRAVENOUS AS NEEDED
Status: DISCONTINUED | OUTPATIENT
Start: 2024-02-06 | End: 2024-02-06 | Stop reason: SURG

## 2024-02-06 RX ORDER — PROMETHAZINE HYDROCHLORIDE 25 MG/1
25 SUPPOSITORY RECTAL ONCE AS NEEDED
Status: DISCONTINUED | OUTPATIENT
Start: 2024-02-06 | End: 2024-02-06 | Stop reason: HOSPADM

## 2024-02-06 RX ORDER — HYDROCODONE BITARTRATE AND ACETAMINOPHEN 5; 325 MG/1; MG/1
1 TABLET ORAL ONCE AS NEEDED
Status: DISCONTINUED | OUTPATIENT
Start: 2024-02-06 | End: 2024-02-06 | Stop reason: HOSPADM

## 2024-02-06 RX ORDER — HYDROMORPHONE HYDROCHLORIDE 1 MG/ML
0.25 INJECTION, SOLUTION INTRAMUSCULAR; INTRAVENOUS; SUBCUTANEOUS
Status: DISCONTINUED | OUTPATIENT
Start: 2024-02-06 | End: 2024-02-06 | Stop reason: HOSPADM

## 2024-02-06 RX ORDER — PROTAMINE SULFATE 10 MG/ML
INJECTION, SOLUTION INTRAVENOUS AS NEEDED
Status: DISCONTINUED | OUTPATIENT
Start: 2024-02-06 | End: 2024-02-06 | Stop reason: SURG

## 2024-02-06 RX ORDER — ACETAMINOPHEN 325 MG/1
650 TABLET ORAL EVERY 4 HOURS PRN
Status: DISCONTINUED | OUTPATIENT
Start: 2024-02-06 | End: 2024-02-07 | Stop reason: HOSPADM

## 2024-02-06 RX ORDER — HYDROCHLOROTHIAZIDE 25 MG/1
25 TABLET ORAL DAILY PRN
Status: DISCONTINUED | OUTPATIENT
Start: 2024-02-06 | End: 2024-02-07 | Stop reason: HOSPADM

## 2024-02-06 RX ORDER — NITROGLYCERIN 0.4 MG/1
0.4 TABLET SUBLINGUAL
Status: DISCONTINUED | OUTPATIENT
Start: 2024-02-06 | End: 2024-02-07 | Stop reason: HOSPADM

## 2024-02-06 RX ORDER — FENTANYL CITRATE 50 UG/ML
50 INJECTION, SOLUTION INTRAMUSCULAR; INTRAVENOUS ONCE AS NEEDED
Status: COMPLETED | OUTPATIENT
Start: 2024-02-06 | End: 2024-02-06

## 2024-02-06 RX ORDER — CHLORHEXIDINE GLUCONATE ORAL RINSE 1.2 MG/ML
15 SOLUTION DENTAL ONCE
Status: COMPLETED | OUTPATIENT
Start: 2024-02-06 | End: 2024-02-06

## 2024-02-06 RX ORDER — SODIUM CHLORIDE 0.9 % (FLUSH) 0.9 %
3 SYRINGE (ML) INJECTION EVERY 12 HOURS SCHEDULED
Status: DISCONTINUED | OUTPATIENT
Start: 2024-02-06 | End: 2024-02-06 | Stop reason: HOSPADM

## 2024-02-06 RX ORDER — MIDAZOLAM HYDROCHLORIDE 1 MG/ML
0.5 INJECTION INTRAMUSCULAR; INTRAVENOUS
Status: DISCONTINUED | OUTPATIENT
Start: 2024-02-06 | End: 2024-02-06 | Stop reason: HOSPADM

## 2024-02-06 RX ORDER — LISINOPRIL 40 MG/1
40 TABLET ORAL DAILY
Status: DISCONTINUED | OUTPATIENT
Start: 2024-02-06 | End: 2024-02-07 | Stop reason: HOSPADM

## 2024-02-06 RX ORDER — HYDRALAZINE HYDROCHLORIDE 20 MG/ML
10 INJECTION INTRAMUSCULAR; INTRAVENOUS ONCE
Status: COMPLETED | OUTPATIENT
Start: 2024-02-06 | End: 2024-02-06

## 2024-02-06 RX ORDER — IPRATROPIUM BROMIDE AND ALBUTEROL SULFATE 2.5; .5 MG/3ML; MG/3ML
3 SOLUTION RESPIRATORY (INHALATION) ONCE AS NEEDED
Status: DISCONTINUED | OUTPATIENT
Start: 2024-02-06 | End: 2024-02-06 | Stop reason: HOSPADM

## 2024-02-06 RX ORDER — NOREPINEPHRINE BITARTRATE 1 MG/ML
INJECTION, SOLUTION INTRAVENOUS CONTINUOUS PRN
Status: DISCONTINUED | OUTPATIENT
Start: 2024-02-06 | End: 2024-02-06 | Stop reason: SURG

## 2024-02-06 RX ORDER — SODIUM CHLORIDE 9 MG/ML
INJECTION, SOLUTION INTRAVENOUS CONTINUOUS PRN
Status: DISCONTINUED | OUTPATIENT
Start: 2024-02-06 | End: 2024-02-06 | Stop reason: SURG

## 2024-02-06 RX ORDER — NITROGLYCERIN 0.4 MG/1
0.4 TABLET SUBLINGUAL
Status: DISCONTINUED | OUTPATIENT
Start: 2024-02-06 | End: 2024-02-06 | Stop reason: SDUPTHER

## 2024-02-06 RX ORDER — FAMOTIDINE 10 MG/ML
20 INJECTION, SOLUTION INTRAVENOUS ONCE
Status: COMPLETED | OUTPATIENT
Start: 2024-02-06 | End: 2024-02-06

## 2024-02-06 RX ORDER — SODIUM CHLORIDE 9 MG/ML
75 INJECTION, SOLUTION INTRAVENOUS CONTINUOUS
Status: DISCONTINUED | OUTPATIENT
Start: 2024-02-06 | End: 2024-02-07

## 2024-02-06 RX ORDER — CEPHALEXIN 500 MG/1
500 CAPSULE ORAL 3 TIMES DAILY
Status: DISCONTINUED | OUTPATIENT
Start: 2024-02-07 | End: 2024-02-07 | Stop reason: HOSPADM

## 2024-02-06 RX ORDER — FENTANYL CITRATE 50 UG/ML
25 INJECTION, SOLUTION INTRAMUSCULAR; INTRAVENOUS
Status: DISCONTINUED | OUTPATIENT
Start: 2024-02-06 | End: 2024-02-06 | Stop reason: HOSPADM

## 2024-02-06 RX ORDER — HYDROCODONE BITARTRATE AND ACETAMINOPHEN 7.5; 325 MG/1; MG/1
1 TABLET ORAL EVERY 4 HOURS PRN
Status: DISCONTINUED | OUTPATIENT
Start: 2024-02-06 | End: 2024-02-06 | Stop reason: HOSPADM

## 2024-02-06 RX ORDER — ASPIRIN 81 MG/1
81 TABLET ORAL DAILY
Status: DISCONTINUED | OUTPATIENT
Start: 2024-02-06 | End: 2024-02-06

## 2024-02-06 RX ORDER — DROPERIDOL 2.5 MG/ML
0.62 INJECTION, SOLUTION INTRAMUSCULAR; INTRAVENOUS
Status: DISCONTINUED | OUTPATIENT
Start: 2024-02-06 | End: 2024-02-06 | Stop reason: HOSPADM

## 2024-02-06 RX ORDER — ONDANSETRON 2 MG/ML
4 INJECTION INTRAMUSCULAR; INTRAVENOUS ONCE AS NEEDED
Status: COMPLETED | OUTPATIENT
Start: 2024-02-06 | End: 2024-02-06

## 2024-02-06 RX ORDER — SODIUM CHLORIDE 0.9 % (FLUSH) 0.9 %
3-10 SYRINGE (ML) INJECTION AS NEEDED
Status: DISCONTINUED | OUTPATIENT
Start: 2024-02-06 | End: 2024-02-06 | Stop reason: HOSPADM

## 2024-02-06 RX ORDER — EPHEDRINE SULFATE 50 MG/ML
5 INJECTION, SOLUTION INTRAVENOUS ONCE AS NEEDED
Status: DISCONTINUED | OUTPATIENT
Start: 2024-02-06 | End: 2024-02-06 | Stop reason: HOSPADM

## 2024-02-06 RX ORDER — NALOXONE HCL 0.4 MG/ML
0.2 VIAL (ML) INJECTION AS NEEDED
Status: DISCONTINUED | OUTPATIENT
Start: 2024-02-06 | End: 2024-02-06 | Stop reason: HOSPADM

## 2024-02-06 RX ORDER — SODIUM CHLORIDE, SODIUM LACTATE, POTASSIUM CHLORIDE, CALCIUM CHLORIDE 600; 310; 30; 20 MG/100ML; MG/100ML; MG/100ML; MG/100ML
9 INJECTION, SOLUTION INTRAVENOUS CONTINUOUS
Status: DISCONTINUED | OUTPATIENT
Start: 2024-02-06 | End: 2024-02-07 | Stop reason: HOSPADM

## 2024-02-06 RX ORDER — DIPHENHYDRAMINE HYDROCHLORIDE 50 MG/ML
12.5 INJECTION INTRAMUSCULAR; INTRAVENOUS
Status: DISCONTINUED | OUTPATIENT
Start: 2024-02-06 | End: 2024-02-06 | Stop reason: HOSPADM

## 2024-02-06 RX ORDER — LABETALOL HYDROCHLORIDE 5 MG/ML
5 INJECTION, SOLUTION INTRAVENOUS
Status: DISCONTINUED | OUTPATIENT
Start: 2024-02-06 | End: 2024-02-06 | Stop reason: HOSPADM

## 2024-02-06 RX ORDER — LIDOCAINE HYDROCHLORIDE 10 MG/ML
0.5 INJECTION, SOLUTION INFILTRATION; PERINEURAL ONCE AS NEEDED
Status: DISCONTINUED | OUTPATIENT
Start: 2024-02-06 | End: 2024-02-06 | Stop reason: HOSPADM

## 2024-02-06 RX ORDER — HEPARIN SODIUM 1000 [USP'U]/ML
INJECTION, SOLUTION INTRAVENOUS; SUBCUTANEOUS AS NEEDED
Status: DISCONTINUED | OUTPATIENT
Start: 2024-02-06 | End: 2024-02-06 | Stop reason: SURG

## 2024-02-06 RX ORDER — ATORVASTATIN CALCIUM 80 MG/1
40 TABLET, FILM COATED ORAL NIGHTLY
Status: DISCONTINUED | OUTPATIENT
Start: 2024-02-06 | End: 2024-02-07 | Stop reason: HOSPADM

## 2024-02-06 RX ADMIN — SODIUM CHLORIDE 75 ML/HR: 9 INJECTION, SOLUTION INTRAVENOUS at 16:32

## 2024-02-06 RX ADMIN — HYDRALAZINE HYDROCHLORIDE 10 MG: 20 INJECTION, SOLUTION INTRAMUSCULAR; INTRAVENOUS at 13:57

## 2024-02-06 RX ADMIN — PROPOFOL 100 MCG/KG/MIN: 10 INJECTION, EMULSION INTRAVENOUS at 08:58

## 2024-02-06 RX ADMIN — 0.12% CHLORHEXIDINE GLUCONATE 15 ML: 1.2 RINSE ORAL at 08:15

## 2024-02-06 RX ADMIN — ONDANSETRON 4 MG: 2 INJECTION INTRAMUSCULAR; INTRAVENOUS at 15:08

## 2024-02-06 RX ADMIN — SODIUM CHLORIDE 1 MCG/KG/HR: 9 INJECTION, SOLUTION INTRAVENOUS at 08:58

## 2024-02-06 RX ADMIN — CEFAZOLIN SODIUM 2 G: 2 INJECTION, SOLUTION INTRAVENOUS at 09:10

## 2024-02-06 RX ADMIN — HYDRALAZINE HYDROCHLORIDE 10 MG: 20 INJECTION, SOLUTION INTRAMUSCULAR; INTRAVENOUS at 15:08

## 2024-02-06 RX ADMIN — HEPARIN SODIUM 15000 UNITS: 1000 INJECTION, SOLUTION INTRAVENOUS; SUBCUTANEOUS at 09:45

## 2024-02-06 RX ADMIN — HYDROMORPHONE HYDROCHLORIDE 0.25 MG: 1 INJECTION, SOLUTION INTRAMUSCULAR; INTRAVENOUS; SUBCUTANEOUS at 11:46

## 2024-02-06 RX ADMIN — PROTAMINE SULFATE 5 MG: 10 INJECTION, SOLUTION INTRAVENOUS at 10:19

## 2024-02-06 RX ADMIN — HYDROMORPHONE HYDROCHLORIDE 0.25 MG: 1 INJECTION, SOLUTION INTRAMUSCULAR; INTRAVENOUS; SUBCUTANEOUS at 10:56

## 2024-02-06 RX ADMIN — CEFAZOLIN SODIUM 2000 MG: 2 INJECTION, SOLUTION INTRAVENOUS at 07:13

## 2024-02-06 RX ADMIN — PHENYLEPHRINE HYDROCHLORIDE 100 MCG: 10 INJECTION INTRAVENOUS at 10:10

## 2024-02-06 RX ADMIN — SODIUM CHLORIDE: 9 INJECTION, SOLUTION INTRAVENOUS at 08:55

## 2024-02-06 RX ADMIN — PROTAMINE SULFATE 40 MG: 10 INJECTION, SOLUTION INTRAVENOUS at 10:09

## 2024-02-06 RX ADMIN — SODIUM CHLORIDE: 9 INJECTION, SOLUTION INTRAVENOUS at 09:45

## 2024-02-06 RX ADMIN — NOREPINEPHRINE BITARTRATE 0.02 MCG/KG/MIN: 1 SOLUTION INTRAVENOUS at 09:22

## 2024-02-06 RX ADMIN — FENTANYL CITRATE 50 MCG: 50 INJECTION, SOLUTION INTRAMUSCULAR; INTRAVENOUS at 07:55

## 2024-02-06 RX ADMIN — MIDAZOLAM 1 MG: 1 INJECTION INTRAMUSCULAR; INTRAVENOUS at 07:54

## 2024-02-06 RX ADMIN — Medication 3 ML: at 08:03

## 2024-02-06 RX ADMIN — FAMOTIDINE 20 MG: 10 INJECTION INTRAVENOUS at 07:28

## 2024-02-06 RX ADMIN — HYDROMORPHONE HYDROCHLORIDE 0.25 MG: 1 INJECTION, SOLUTION INTRAMUSCULAR; INTRAVENOUS; SUBCUTANEOUS at 11:06

## 2024-02-06 RX ADMIN — PHENYLEPHRINE HYDROCHLORIDE 50 MCG: 10 INJECTION INTRAVENOUS at 10:01

## 2024-02-06 NOTE — ANESTHESIA PREPROCEDURE EVALUATION
Anesthesia Evaluation     Patient summary reviewed and Nursing notes reviewed   no history of anesthetic complications:                Airway   Mallampati: II  TM distance: >3 FB  Neck ROM: full  Dental    (+) edentulous    Pulmonary    (+) ,shortness of breath  Cardiovascular   Exercise tolerance: poor (<4 METS)    ECG reviewed  PT is on anticoagulation therapy    (+) hypertension, valvular problems/murmurs AS, CAD, cardiac stents Drug eluting stent within the past 12 months , EATON, hyperlipidemia    ROS comment: LHC:  Severe aortic stenosis with class III heart failure symptoms but also had severe disease in the mid LAD successful angioplasty drug-eluting stenting    Neuro/Psych- negative ROS  GI/Hepatic/Renal/Endo    (+) obesity    Musculoskeletal (-) negative ROS    Abdominal    Substance History      OB/GYN          Other - negative ROS                         Anesthesia Plan    ASA 4     MAC and Perry   total IV anesthesia  (I have reviewed the patient's history with the patient and the chart, including all pertinent laboratory results and imaging. I have explained the risks of anesthesia including but not limited to dental damage, corneal abrasion, nerve injury, MI, stroke, and death. Questions asked and answered. Anesthetic plan discussed with patient and team as indicated. Patient expressed understanding of the above.  )    Anesthetic plan, risks, benefits, and alternatives have been provided, discussed and informed consent has been obtained with: patient.    Use of blood products discussed with  Consented to blood products.        CODE STATUS:

## 2024-02-06 NOTE — ANESTHESIA PROCEDURE NOTES
Arterial Line      Patient reassessed immediately prior to procedure    Patient location during procedure: pre-op  Start time: 2/6/2024 7:50 AM  Stop Time:2/6/2024 7:55 AM       Line placed for hemodynamic monitoring and ABGs/Labs/ISTAT.  Performed By   Anesthesiologist: Silver Moralez MD   Preanesthetic Checklist  Completed: patient identified, IV checked, site marked, risks and benefits discussed, surgical consent, monitors and equipment checked, pre-op evaluation and timeout performed  Arterial Line Prep    Sterile Tech: gloves, mask, cap and sterile barriers  Prep: ChloraPrep  Patient monitoring: blood pressure monitoring, continuous pulse oximetry and EKG  Arterial Line Procedure   Laterality:left  Location:  radial artery  Catheter size: 20 G   Guidance: ultrasound guided  PROCEDURE NOTE/ULTRASOUND INTERPRETATION.  Using ultrasound guidance the potential vascular sites for insertion of the catheter were visualized to determine the patency of the vessel to be used for vascular access.  After selecting the appropriate site for insertion, the needle was visualized under ultrasound being inserted into the radial artery, followed by ultrasound confirmation of wire and catheter placement. There were no abnormalities seen on ultrasound; an image was taken; and the patient tolerated the procedure with no complications.   Number of attempts: 1  Successful placement: yes   Post Assessment   Dressing Type: occlusive dressing applied, secured with tape and wrist guard applied.   Complications no  Circ/Move/Sens Assessment: unchanged.   Patient Tolerance: patient tolerated the procedure well with no apparent complications

## 2024-02-06 NOTE — H&P
Date of Hospital Visit: 24  Encounter Provider: Vinod Gerber MD  Place of Service: [facility] CARDIOLOGY  Patient Name: Arlene Farrell  :1945  [id number]     Chief complaint: Shortness of breath     History of Present Illness: 78-year-old woman does not have a lot of medical contact had not seen physicians for years and came to have a heart clinic because of shortness of breath was noted to be a little bit hypertensive but also had a loud late peaking systolic heart murmur.  Echo revealed severe aortic stenosis with a peak gradient of over 75 mmHg.  Normal LV systolic function.  She does feel better since it started treating her hypertension but she still has dyspnea on exertion with even minimal activity no real chest discomfort no PND no orthopnea no edema no syncope no bleeding difficulty no history of lung or kidney disease she does not smoke or have diabetes.  She lives alone with 2 cats no other family does not do a lot of activity     Medical History        Past Medical History:   Diagnosis Date    Angina pectoris      Dyspnea on exertion      HLD (hyperlipidemia)      Hypertension              Surgical History         Past Surgical History:   Procedure Laterality Date    BREAST LUMPECTOMY Left      ORIF ANKLE FRACTURE Right              Prescriptions Prior to Admission           Medications Prior to Admission   Medication Sig Dispense Refill Last Dose    hydroCHLOROthiazide (HYDRODIURIL) 25 MG tablet Take 1 tablet by mouth Daily.     2023    lisinopril (PRINIVIL,ZESTRIL) 40 MG tablet Take 1 tablet by mouth Daily.     2023            Current Meds  No current facility-administered medications on file prior to encounter.             Current Outpatient Medications on File Prior to Encounter   Medication Sig Dispense Refill    hydroCHLOROthiazide (HYDRODIURIL) 25 MG tablet Take 1 tablet by mouth Daily.        lisinopril (PRINIVIL,ZESTRIL) 40 MG tablet Take 1  tablet by mouth Daily.        [DISCONTINUED] aspirin 81 MG EC tablet Take 1 tablet by mouth Daily.        [DISCONTINUED] atorvastatin (LIPITOR) 40 MG tablet Take 1 tablet by mouth Daily. Has not started taking        [DISCONTINUED] lisinopril-hydrochlorothiazide (PRINZIDE,ZESTORETIC) 20-12.5 MG per tablet Take 1 tablet by mouth Daily.        [DISCONTINUED] meloxicam (MOBIC) 15 MG tablet Take 1 tablet by mouth Daily.        [DISCONTINUED] metoprolol succinate XL (TOPROL-XL) 25 MG 24 hr tablet Take 1 tablet by mouth Daily.        [DISCONTINUED] omeprazole (priLOSEC) 20 MG capsule Take 1 capsule by mouth Daily.        [DISCONTINUED] triamcinolone (KENALOG) 0.1 % cream Apply  topically 3 (Three) Times a Day. 30 g 0         Social History   Social History            Socioeconomic History    Marital status:    Tobacco Use    Smoking status: Never   Substance and Sexual Activity    Alcohol use: Yes       Comment: 'seldom'    Drug use: Not Currently    Sexual activity: Defer            Family Hx: Non-contributory     REVIEW OF SYSTEMS:   ROS was performed and is negative except as outlined in HPI     REVIEW OF SYSTEMS:   CONSTITUTIONAL: No weight loss, fever, chills, weakness or fatigue.   HEENT: Eyes: No visual loss, blurred vision, double vision or yellow sclerae. Ears, Nose, Throat: No hearing loss, sneezing, congestion, runny nose or sore throat.   SKIN: No rash or itching.     RESPIRATORY: No shortness of breath, hemoptysis, cough or sputum.   GASTROINTESTINAL: No anorexia, nausea, vomiting or diarrhea. No abdominal pain, bright red blood per rectum or melena.  NEUROLOGICAL: No headache, dizziness, syncope, paralysis, numbness or tingling in the extremities.  MUSCULOSKELETAL: No muscle, back pain, joint pain or stiffness.   HEMATOLOGIC: No anemia, bleeding or bruising.   LYMPHATICS: No enlarged nodes.  PSYCHIATRIC: No history of depression, anxiety, hallucinations.   ENDOCRINOLOGIC: No reports of sweating, cold  "or heat intolerance. No polyuria or polydipsia.      Objective:      Vitals       Vitals:     12/14/23 0944   BP: (!) 171/107   BP Location: Right arm   Patient Position: Lying   Pulse: 100   Resp: 18   Temp: 98 °F (36.7 °C)   TempSrc: Temporal   SpO2: 99%   Weight: 107 kg (235 lb)   Height: 165.1 cm (65\")         Body mass index is 39.11 kg/m².  Flowsheet Rows       Flowsheet Row First Filed Value   Admission Height 165.1 cm (65\") Documented at 12/14/2023 0944   Admission Weight 107 kg (235 lb) Documented at 12/14/2023 0944                General Appearance:    Alert, oriented x 3, in no acute distress   Head:    Normocephalic, without obvious abnormality, atraumatic   Ears:    Ears appear intact with no abnormalities noted   Throat:   No oral lesions, dentition good   Neck:   No adenopathy, supple, trachea midline, no thyromegaly, no carotid bruit, no JVD   Lungs:    Breath sounds are equal and  clear to auscultation    Heart:   Normal S1 and absent S2, RRR, late peaking systolic ejection murmur/gallop or rub   Abdomen:    Normal bowel sounds, obese, soft non-tender, non-distended, no organomegaly, no guarding   Extremities:   Moves all extremities well, no edema, no cyanosis, no redness   Pulses:   Pulses palpable and equal bilaterally. Normal radial pulses   Skin:   No bleeding, bruising or rash   Lymph nodes:   No palpable adenopathy      I personally viewed and interpreted the patient's EKG/Telemetry data     Assessment:        Active Hospital Problems     Diagnosis   POA    **Nonrheumatic aortic valve stenosis [I35.0]   Unknown       Resolved Hospital Problems   No resolved problems to display.         Plan: Severe probably degenerative aortic stenosis in an elderly woman her overall frailty seems a little bit high to me.  I am leaning toward more of a TAVR evaluation for her.  She is sent for cardiac cath today if that is okay then were going to go down the road of pursuing a TAVR I think for her the low " risk trials which show and equivalency and outcomes and I think overall it might be a better route for her to go.  Further decisions will be based on the findings at the time of cath today    The above H&P still is pertinent.  She has gone an extensive TAVR evaluation underwent cardiac catheterization with stenting to her LAD.  She is also been evaluated for some scar tissue on her breast at the previous site of her lumpectomy and felt to be stable for that.  She is going to have a #26 Tate valve placed via the transfemoral approach.  Will place a pacemaker via the common femoral vein.  I have discussed the risk and benefits of this with her in the office but also again this morning and she agrees and understands.  Of note she did have Klebsiella in her urine and we are going to give her Ancef preoperatively which it is sensitive to and we will treat her for 3 days afterward with it.  Gram-negative rods typically do not cause endocarditis this is out of an abundance of caution and she seems to be asymptomatic from it    TAVR PLAN OF CARE    The patient is an 78-year old woman with severe non-rheumatic degenerative aortic stenosis.  She has class III CHF symptoms.  She has an STS score of 4%, indicating high risk for cardiac surgery.  Comorbidities include obesity, coronary artery disease, class III heart failure, age and frailty as well as equivalency of outcomes in the randomized trials.  The patient was was discussed in the multi-disciplinary TAVR conference.  We plan to insert a 26-mm Tate Jose valve via a femoral approach.  Temporary pacing will be performed from the L common femoral vein approach.  The patient would be a candidate for bailout surgery in the event of unsuccessful TAVR.

## 2024-02-06 NOTE — OP NOTE
TAVR OPERATIVE REPORT    CO-SURGEON: Franki Deluca MD  CO-SURGEON: Vinod Gerber MD    DIAGNOSIS: Severe symptomatic aortic stenosis.     POSTOP DIAGNOSIS: Severe symptomatic aortic stenosis.     PRESENT CO-MORBIDITIES:   Severe symptomatic nonrheumatic aortic stenosis, heart failure class III diastolic, CAD status post PCI 2023, hypertension, hyperlipidemia, breast cancer status postlumpectomy    PROCEDURE: Elective procedure in hybrid operating room     1. Transcatheter aortic valve replacement (TAVR) utilizing a 14 Thai eSheath and a 26 mm Tate ultra transcatheter heart valve  2. Percutaneous left femoral arterial access   3. Percutaneous right femoral arterial and venous access  4. Abdominal aortography and bilateral lower extremity angiography  5. Transvenous pacing using a 5-Thai balloon-tip pacer  6. Supravalvular aortogram  8. Trans thoracic echocardiogram  9. Arterial line placement  10. Central venous catheter placement      INDICATIONS FOR OPERATION: The patient is a 78-year-old with New York Heart Association Class 3 symptoms and STS score of 3.7%. The patient was determined to be best suited for TAVR by the multidisciplinary heart team due to frailty, multiple surgeries, risk of infection and overall comorbidities    FDA TAVR INDICATIONS: The patient was judged to be suitable for TAVR by the multidisciplinary team as reviewed by two cardiac surgeons, an interventional cardiologist, and the rest of the TAVR team.  The patient, family and team were in agreement that the case would convert to an open surgical AVR in the event of unsuccessful TAVR. The patient’s co-morbidities would not preclude the expected benefit from correction of the aortic stenosis by TAVR based on the team discussion. The patient will be enrolled in the STS/ACC TAVR TVT registry.     DESCRIPTION: Dr. Gerber (interventional cardiologist) and Dr. Deluca (cardiothoracic surgeon) performed the procedure together in all  "preoperative and operative aspects. The patient was taken to the hybrid OR. A radial art line, central venous access, and Li catheter were inserted preoperatively. Anesthesia was administered without apparent complication. The patient was prepped and draped in the usual sterile fashion.      Using a micropuncture technique, access was obtained in the right femoral vein and a microsheath was inserted.  Angiography confirmed access in the vein.  A 6 Fijian sheath was inserted.  A similar technique was used to obtain access in the right common femoral artery.  Angiography through the microsheath confirmed good position of the arterial access point.  A 6 Fijian sheath was inserted.  2 Perclose sutures were deployed in offset manner in the right femoral artery using the \"Preclose\" technique.  An 8 Fijian sheath was inserted.  A similar technique micropuncture technique was used to obtain access in the left common femoral artery and to confirm position.  A 6 Fijian pigtail was directed to the right coronary cusp.  Angiography was performed using 20 cc of contrast. A 5 Fijian pacing catheter was directed to the RV apex and was tested.      We inserted an 5 Fr FR 4 catheter to the aortic arch.  Through this, we inserted a Lunderquist wire.  Over this we performed sequential dilation until a 14 Fijian E-sheath could be inserted to the abdominal aorta.  We exchanged for an 6 Fijian AL-1 catheter and crossed the valve easily using a straight wire.  We exchanged for a pigtail catheter in the LV, then optimized position in the LV apex.  We then inserted an Amplatz Extra Stiff guidewire.    Aortography was performed  to confirm the deployment angle and position of the TAVR valve. The orientation of a 26 mm Tate ultra TAVR valve was confirmed by multiple physicians, then was loaded in the sheath and was advanced to the descending aorta.  The valve was centered on the balloon easily, then was advanced across the aortic arch. "     The THV was then placed and repositioned in the aortic annulus, and placement was confirmed by an aortogram. The co-operators were all in agreement for valve positioning prior to deployment. In a coordinated fashion, rapid ventricular pacing and the TAVR valve was deployed for a total of 5 seconds.  The valve delivery balloon was then deflated, then was withdrawn and pacing was discontinued.   Proper valve placement, orientation and degree of regurgitation was then evaluated by trans thoracic echocardiogram and aortography. The co-operators agreed that no further intervention was necessary. The transaortic guidewire was then removed through the pigtail catheter. The pigtail catheter was then advanced in the left ventricle where intracardiac pressures were acquired. We then removed our eSheath from our introducer sheath. The introducer sheath was then pulled into the distal external iliac artery with the wire still in place. From the contralateral side, the pigtail catheter was advanced into the distal abdomen and abdominal aortography was performed. All parties were in agreement that there was no flow-limiting vascular trauma or extravasation of dye, at which point closure of the arteriotomy was performed with our 2 Perclose devices.  The venous sheath and temporary pacemaker were then removed. Finally, our contralateral access was removed and closed using a 6 Bangladeshi Angioseal. The patient left in the care of the anesthesia team for extubation and hemodynamic monitoring. The patient was transported to the Open Heart Recovery Unit for further monitoring and care.       1. Hemodynamics:  Post TAVR aortic gradient: 9 mmHg.  Post TAVR AI: Trace. Post BREE: 1.67 cm².     CONTRAST USED: 100 mL.     FLUROSCOPY TIME: 9 min    Air KERMA: 443 mGray    EBL: minimal    SPECIMENS: None    CONCLUSIONS:  Successful deployment of a 26 mm Tate ultra transcatheter aortic heart valve.

## 2024-02-06 NOTE — PLAN OF CARE
AAOX4. RA. SR w/1st degree AV block. Bedrest until 1830. Patient up to bathroom voiding without difficulty. Bilateral groin sites with no new drainage       Goal Outcome Evaluation:  Plan of Care Reviewed With: patient        Progress: improving        Problem: Adult Inpatient Plan of Care  Goal: Plan of Care Review  Outcome: Ongoing, Progressing  Flowsheets (Taken 2/6/2024 1856)  Progress: improving  Plan of Care Reviewed With: patient  Goal: Patient-Specific Goal (Individualized)  Outcome: Ongoing, Progressing  Goal: Absence of Hospital-Acquired Illness or Injury  Outcome: Ongoing, Progressing  Intervention: Identify and Manage Fall Risk  Recent Flowsheet Documentation  Taken 2/6/2024 1845 by Connie Live RN  Safety Promotion/Fall Prevention:   safety round/check completed   room organization consistent   nonskid shoes/slippers when out of bed   lighting adjusted   clutter free environment maintained   activity supervised   assistive device/personal items within reach  Taken 2/6/2024 1619 by Connie Live RN  Safety Promotion/Fall Prevention:   safety round/check completed   room organization consistent   nonskid shoes/slippers when out of bed   lighting adjusted   clutter free environment maintained   assistive device/personal items within reach   activity supervised  Intervention: Prevent Skin Injury  Recent Flowsheet Documentation  Taken 2/6/2024 1845 by Connie Live RN  Body Position: position changed independently  Taken 2/6/2024 1619 by Connie Live RN  Body Position: supine  Intervention: Prevent and Manage VTE (Venous Thromboembolism) Risk  Recent Flowsheet Documentation  Taken 2/6/2024 1845 by Connie Live RN  Activity Management: ambulated to bathroom  Taken 2/6/2024 1800 by Connie Live RN  Activity Management: ambulated to bathroom  Taken 2/6/2024 1619 by Connie Live RN  Activity Management: bedrest  VTE Prevention/Management:   bilateral   sequential compression  devices off  Goal: Optimal Comfort and Wellbeing  Outcome: Ongoing, Progressing  Intervention: Provide Person-Centered Care  Recent Flowsheet Documentation  Taken 2/6/2024 1619 by Connie Live, RN  Trust Relationship/Rapport:   care explained   choices provided   emotional support provided   empathic listening provided   questions encouraged   questions answered   reassurance provided   thoughts/feelings acknowledged  Goal: Readiness for Transition of Care  Outcome: Ongoing, Progressing  Intervention: Mutually Develop Transition Plan  Recent Flowsheet Documentation  Taken 2/6/2024 1628 by Connie Live, RN  Transportation Anticipated: public transportation  Transportation Concerns: rides, unreliable from others  Patient/Family Anticipated Services at Transition:   home health care   skilled nursing  Patient/Family Anticipates Transition to: shelter     Problem: Skin Injury Risk Increased  Goal: Skin Health and Integrity  Outcome: Ongoing, Progressing  Intervention: Optimize Skin Protection  Recent Flowsheet Documentation  Taken 2/6/2024 1845 by Connie Live, RN  Head of Bed (HOB) Positioning: HOB elevated  Taken 2/6/2024 1619 by Connie Live, RN  Head of Bed (HOB) Positioning: HOB flat

## 2024-02-06 NOTE — ANESTHESIA POSTPROCEDURE EVALUATION
"Patient: Arlene Farrell    Procedure Summary       Date: 02/06/24 Room / Location: [address] CARDIOVASCULAR OPERATING ROOM    Anesthesia Start: 0853 Anesthesia Stop: 1039    Procedures:       TTE TRANSFEMORAL TRANSCATHETER AORTIC VALVE REPLACEMENT PERCUTANEOUS APPROACH (Chest)      Transfemoral Transcatheter Aortic Valve Replacement with intraoperative TTE and possible open surgical rescue Diagnosis:       Coronary artery disease involving native coronary artery of native heart with angina pectoris      (Coronary artery disease involving native coronary artery of native heart with angina pectoris [I25.119])    Surgeons: Franki Deluca MD; Vinod Gerber MD Provider: Vladislav Nicholson MD    Anesthesia Type: MAC, Anna Marie ASA Status: 4            Anesthesia Type: MAC, Anna Marie    Vitals  Vitals Value Taken Time   /72 02/06/24 1145   Temp 36.3 °C (97.4 °F) 02/06/24 1045   Pulse 65 02/06/24 1146   Resp 20 02/06/24 1130   SpO2 99 % 02/06/24 1146   Vitals shown include unfiled device data.        Post Anesthesia Care and Evaluation    Level of consciousness: awake and alert  Pain management: adequate    Airway patency: patent  Anesthetic complications: No anesthetic complications  PONV Status: none  Cardiovascular status: acceptable  Respiratory status: acceptable  Hydration status: acceptable    Comments: /64   Pulse 66   Temp 36.3 °C (97.4 °F) (Oral)   Resp 20   Ht 166.4 cm (65.5\")   Wt 103 kg (227 lb 12.8 oz)   SpO2 98%   BMI 37.33 kg/m²       "

## 2024-02-07 ENCOUNTER — APPOINTMENT (OUTPATIENT)
Dept: CARDIOLOGY | Facility: HOSPITAL | Age: 79
DRG: 267 | End: 2024-02-07
Payer: MEDICARE

## 2024-02-07 VITALS
TEMPERATURE: 98.4 F | DIASTOLIC BLOOD PRESSURE: 75 MMHG | OXYGEN SATURATION: 99 % | HEIGHT: 66 IN | BODY MASS INDEX: 36.48 KG/M2 | RESPIRATION RATE: 16 BRPM | HEART RATE: 110 BPM | WEIGHT: 227 LBS | SYSTOLIC BLOOD PRESSURE: 141 MMHG

## 2024-02-07 LAB
ANION GAP SERPL CALCULATED.3IONS-SCNC: 7.6 MMOL/L (ref 5–15)
ASCENDING AORTA: 4.1 CM
BASOPHILS # BLD AUTO: 0.05 10*3/MM3 (ref 0–0.2)
BASOPHILS NFR BLD AUTO: 0.6 % (ref 0–1.5)
BH CV ECHO AV AORTIC VALVE AT ACCEL TIME CALCULATED: 116 MSEC
BH CV ECHO MEAS - AO MAX PG: 20.2 MMHG
BH CV ECHO MEAS - AO MEAN PG: 21 MMHG
BH CV ECHO MEAS - AO V2 MAX: 224.9 CM/SEC
BH CV ECHO MEAS - AO V2 VTI: 57.1 CM
BH CV ECHO MEAS - AT: 0.12 SEC
BH CV ECHO MEAS - AVA(I,D): 1.27 CM2
BH CV ECHO MEAS - EDV(CUBED): 101.5 ML
BH CV ECHO MEAS - EDV(MOD-SP2): 101 ML
BH CV ECHO MEAS - EDV(MOD-SP4): 154 ML
BH CV ECHO MEAS - EF(MOD-BP): 66.3 %
BH CV ECHO MEAS - EF(MOD-SP2): 62.4 %
BH CV ECHO MEAS - EF(MOD-SP4): 68.8 %
BH CV ECHO MEAS - ESV(CUBED): 24.2 ML
BH CV ECHO MEAS - ESV(MOD-SP2): 38 ML
BH CV ECHO MEAS - ESV(MOD-SP4): 48 ML
BH CV ECHO MEAS - FS: 38 %
BH CV ECHO MEAS - IVS/LVPW: 1.11 CM
BH CV ECHO MEAS - IVSD: 1.62 CM
BH CV ECHO MEAS - LAT PEAK E' VEL: 15.1 CM/SEC
BH CV ECHO MEAS - LV DIASTOLIC VOL/BSA (35-75): 73 CM2
BH CV ECHO MEAS - LV MASS(C)D: 304.1 GRAMS
BH CV ECHO MEAS - LV MAX PG: 4.7 MMHG
BH CV ECHO MEAS - LV MEAN PG: 2.8 MMHG
BH CV ECHO MEAS - LV SYSTOLIC VOL/BSA (12-30): 22.7 CM2
BH CV ECHO MEAS - LV V1 MAX: 108.9 CM/SEC
BH CV ECHO MEAS - LV V1 VTI: 23 CM
BH CV ECHO MEAS - LVIDD: 4.7 CM
BH CV ECHO MEAS - LVIDS: 2.9 CM
BH CV ECHO MEAS - LVOT AREA: 3.2 CM2
BH CV ECHO MEAS - LVOT DIAM: 2.01 CM
BH CV ECHO MEAS - LVPWD: 1.47 CM
BH CV ECHO MEAS - MED PEAK E' VEL: 13.9 CM/SEC
BH CV ECHO MEAS - MV A DUR: 0.1 SEC
BH CV ECHO MEAS - MV A MAX VEL: 156.9 CM/SEC
BH CV ECHO MEAS - MV DEC SLOPE: 779.3 CM/SEC2
BH CV ECHO MEAS - MV DEC TIME: 0.15 SEC
BH CV ECHO MEAS - MV E MAX VEL: 118 CM/SEC
BH CV ECHO MEAS - MV E/A: 0.75
BH CV ECHO MEAS - MV MAX PG: 8.6 MMHG
BH CV ECHO MEAS - MV MEAN PG: 4.1 MMHG
BH CV ECHO MEAS - MV P1/2T: 39.5 MSEC
BH CV ECHO MEAS - MV V2 VTI: 26 CM
BH CV ECHO MEAS - MVA(P1/2T): 5.6 CM2
BH CV ECHO MEAS - MVA(VTI): 2.8 CM2
BH CV ECHO MEAS - PA ACC TIME: 0.12 SEC
BH CV ECHO MEAS - PA V2 MAX: 111.2 CM/SEC
BH CV ECHO MEAS - RAP SYSTOLE: 8 MMHG
BH CV ECHO MEAS - RV MAX PG: 5.3 MMHG
BH CV ECHO MEAS - RV V1 MAX: 115.2 CM/SEC
BH CV ECHO MEAS - RV V1 VTI: 19.8 CM
BH CV ECHO MEAS - SI(MOD-SP2): 29.8 ML/M2
BH CV ECHO MEAS - SI(MOD-SP4): 50.2 ML/M2
BH CV ECHO MEAS - SV(LVOT): 72.6 ML
BH CV ECHO MEAS - SV(MOD-SP2): 63 ML
BH CV ECHO MEAS - SV(MOD-SP4): 106 ML
BH CV ECHO MEAS - TAPSE (>1.6): 2.6 CM
BH CV ECHO MEASUREMENTS AVERAGE E/E' RATIO: 8.14
BH CV XLRA - RV BASE: 3.5 CM
BH CV XLRA - RV LENGTH: 7.9 CM
BH CV XLRA - RV MID: 3.1 CM
BH CV XLRA - TDI S': 18.4 CM/SEC
BUN SERPL-MCNC: 16 MG/DL (ref 8–23)
BUN/CREAT SERPL: 22.5 (ref 7–25)
CALCIUM SPEC-SCNC: 8.5 MG/DL (ref 8.6–10.5)
CHLORIDE SERPL-SCNC: 105 MMOL/L (ref 98–107)
CO2 SERPL-SCNC: 25.4 MMOL/L (ref 22–29)
CREAT SERPL-MCNC: 0.71 MG/DL (ref 0.57–1)
DEPRECATED RDW RBC AUTO: 39.4 FL (ref 37–54)
DEPRECATED RDW RBC AUTO: 40.7 FL (ref 37–54)
EGFRCR SERPLBLD CKD-EPI 2021: 87.7 ML/MIN/1.73
EOSINOPHIL # BLD AUTO: 0.04 10*3/MM3 (ref 0–0.4)
EOSINOPHIL NFR BLD AUTO: 0.4 % (ref 0.3–6.2)
ERYTHROCYTE [DISTWIDTH] IN BLOOD BY AUTOMATED COUNT: 13.9 % (ref 12.3–15.4)
ERYTHROCYTE [DISTWIDTH] IN BLOOD BY AUTOMATED COUNT: 14 % (ref 12.3–15.4)
GLUCOSE SERPL-MCNC: 101 MG/DL (ref 65–99)
HCT VFR BLD AUTO: 24.7 % (ref 34–46.6)
HCT VFR BLD AUTO: 24.8 % (ref 34–46.6)
HCT VFR BLD AUTO: 25.5 % (ref 34–46.6)
HGB BLD-MCNC: 7.6 G/DL (ref 12–15.9)
HGB BLD-MCNC: 7.7 G/DL (ref 12–15.9)
HGB BLD-MCNC: 7.8 G/DL (ref 12–15.9)
IMM GRANULOCYTES # BLD AUTO: 0.02 10*3/MM3 (ref 0–0.05)
IMM GRANULOCYTES NFR BLD AUTO: 0.2 % (ref 0–0.5)
LEFT ATRIUM VOLUME INDEX: 42.2 ML/M2
LYMPHOCYTES # BLD AUTO: 1.16 10*3/MM3 (ref 0.7–3.1)
LYMPHOCYTES NFR BLD AUTO: 12.9 % (ref 19.6–45.3)
MCH RBC QN AUTO: 24.1 PG (ref 26.6–33)
MCH RBC QN AUTO: 24.7 PG (ref 26.6–33)
MCHC RBC AUTO-ENTMCNC: 30.2 G/DL (ref 31.5–35.7)
MCHC RBC AUTO-ENTMCNC: 31.6 G/DL (ref 31.5–35.7)
MCV RBC AUTO: 78.2 FL (ref 79–97)
MCV RBC AUTO: 79.7 FL (ref 79–97)
MONOCYTES # BLD AUTO: 0.92 10*3/MM3 (ref 0.1–0.9)
MONOCYTES NFR BLD AUTO: 10.2 % (ref 5–12)
NEUTROPHILS NFR BLD AUTO: 6.81 10*3/MM3 (ref 1.7–7)
NEUTROPHILS NFR BLD AUTO: 75.7 % (ref 42.7–76)
NRBC BLD AUTO-RTO: 0 /100 WBC (ref 0–0.2)
PLATELET # BLD AUTO: 192 10*3/MM3 (ref 140–450)
PLATELET # BLD AUTO: 202 10*3/MM3 (ref 140–450)
PMV BLD AUTO: 10.3 FL (ref 6–12)
PMV BLD AUTO: 9.8 FL (ref 6–12)
POTASSIUM SERPL-SCNC: 3.7 MMOL/L (ref 3.5–5.2)
QT INTERVAL: 384 MS
QTC INTERVAL: 468 MS
RBC # BLD AUTO: 3.16 10*6/MM3 (ref 3.77–5.28)
RBC # BLD AUTO: 3.2 10*6/MM3 (ref 3.77–5.28)
SINUS: 2.9 CM
SODIUM SERPL-SCNC: 138 MMOL/L (ref 136–145)
WBC NRBC COR # BLD AUTO: 7.69 10*3/MM3 (ref 3.4–10.8)
WBC NRBC COR # BLD AUTO: 9 10*3/MM3 (ref 3.4–10.8)

## 2024-02-07 PROCEDURE — 25510000001 PERFLUTREN (DEFINITY) 8.476 MG IN SODIUM CHLORIDE (PF) 0.9 % 10 ML INJECTION: Performed by: INTERNAL MEDICINE

## 2024-02-07 PROCEDURE — 93306 TTE W/DOPPLER COMPLETE: CPT

## 2024-02-07 PROCEDURE — 99024 POSTOP FOLLOW-UP VISIT: CPT | Performed by: THORACIC SURGERY (CARDIOTHORACIC VASCULAR SURGERY)

## 2024-02-07 PROCEDURE — 99232 SBSQ HOSP IP/OBS MODERATE 35: CPT | Performed by: INTERNAL MEDICINE

## 2024-02-07 PROCEDURE — 93010 ELECTROCARDIOGRAM REPORT: CPT | Performed by: INTERNAL MEDICINE

## 2024-02-07 PROCEDURE — 85018 HEMOGLOBIN: CPT | Performed by: NURSE PRACTITIONER

## 2024-02-07 PROCEDURE — 25010000002 FUROSEMIDE PER 20 MG: Performed by: NURSE PRACTITIONER

## 2024-02-07 PROCEDURE — 93306 TTE W/DOPPLER COMPLETE: CPT | Performed by: INTERNAL MEDICINE

## 2024-02-07 PROCEDURE — 85027 COMPLETE CBC AUTOMATED: CPT | Performed by: INTERNAL MEDICINE

## 2024-02-07 PROCEDURE — 93005 ELECTROCARDIOGRAM TRACING: CPT | Performed by: INTERNAL MEDICINE

## 2024-02-07 PROCEDURE — 85014 HEMATOCRIT: CPT | Performed by: NURSE PRACTITIONER

## 2024-02-07 PROCEDURE — 80048 BASIC METABOLIC PNL TOTAL CA: CPT | Performed by: INTERNAL MEDICINE

## 2024-02-07 PROCEDURE — 85025 COMPLETE CBC W/AUTO DIFF WBC: CPT | Performed by: NURSE PRACTITIONER

## 2024-02-07 RX ORDER — FERROUS SULFATE 325(65) MG
325 TABLET ORAL 2 TIMES DAILY WITH MEALS
Qty: 60 TABLET | Refills: 0 | Status: SHIPPED | OUTPATIENT
Start: 2024-02-07 | End: 2024-03-08

## 2024-02-07 RX ORDER — DIPHENOXYLATE HYDROCHLORIDE AND ATROPINE SULFATE 2.5; .025 MG/1; MG/1
1 TABLET ORAL DAILY
Qty: 30 TABLET | Refills: 1 | Status: SHIPPED | OUTPATIENT
Start: 2024-02-08 | End: 2024-04-08

## 2024-02-07 RX ORDER — DIPHENOXYLATE HYDROCHLORIDE AND ATROPINE SULFATE 2.5; .025 MG/1; MG/1
1 TABLET ORAL DAILY
Qty: 30 TABLET | Refills: 1 | Status: SHIPPED | OUTPATIENT
Start: 2024-02-08 | End: 2024-02-07 | Stop reason: SDUPTHER

## 2024-02-07 RX ORDER — FUROSEMIDE 10 MG/ML
20 INJECTION INTRAMUSCULAR; INTRAVENOUS ONCE
Status: COMPLETED | OUTPATIENT
Start: 2024-02-07 | End: 2024-02-07

## 2024-02-07 RX ORDER — MULTIPLE VITAMINS W/ MINERALS TAB 9MG-400MCG
1 TAB ORAL DAILY
Status: DISCONTINUED | OUTPATIENT
Start: 2024-02-07 | End: 2024-02-07 | Stop reason: HOSPADM

## 2024-02-07 RX ORDER — FERROUS SULFATE 325(65) MG
325 TABLET ORAL 2 TIMES DAILY WITH MEALS
Qty: 60 TABLET | Refills: 0 | Status: SHIPPED | OUTPATIENT
Start: 2024-02-07 | End: 2024-02-07 | Stop reason: SDUPTHER

## 2024-02-07 RX ORDER — FERROUS SULFATE 325(65) MG
325 TABLET ORAL 2 TIMES DAILY WITH MEALS
Status: DISCONTINUED | OUTPATIENT
Start: 2024-02-07 | End: 2024-02-07 | Stop reason: HOSPADM

## 2024-02-07 RX ADMIN — FUROSEMIDE 20 MG: 10 INJECTION, SOLUTION INTRAMUSCULAR; INTRAVENOUS at 10:37

## 2024-02-07 RX ADMIN — LISINOPRIL 40 MG: 40 TABLET ORAL at 08:01

## 2024-02-07 RX ADMIN — PERFLUTREN 3 ML: 6.52 INJECTION, SUSPENSION INTRAVENOUS at 07:24

## 2024-02-07 RX ADMIN — Medication 1 TABLET: at 10:37

## 2024-02-07 RX ADMIN — METOPROLOL SUCCINATE 25 MG: 25 TABLET, EXTENDED RELEASE ORAL at 08:01

## 2024-02-07 RX ADMIN — FERROUS SULFATE TAB 325 MG (65 MG ELEMENTAL FE) 325 MG: 325 (65 FE) TAB at 10:37

## 2024-02-07 RX ADMIN — CEPHALEXIN 500 MG: 500 CAPSULE ORAL at 08:01

## 2024-02-07 RX ADMIN — ACETAMINOPHEN 325MG 650 MG: 325 TABLET ORAL at 08:01

## 2024-02-07 NOTE — PROGRESS NOTES
"Arlene Farrell  1945 78 y.o.  [id number]      Patient Care Team:  Provider, No Known as PCP - General    CC: Status post TAVR    Interval History: Doing well this morning      Objective   Vital Signs  Temp:  [97.7 °F (36.5 °C)-98.5 °F (36.9 °C)] 98.4 °F (36.9 °C)  Heart Rate:  [] 110  Resp:  [12-20] 16  BP: ()/(56-91) 141/75    Intake/Output Summary (Last 24 hours) at 2/7/2024 1052  Last data filed at 2/7/2024 0801  Gross per 24 hour   Intake 1080 ml   Output --   Net 1080 ml     Flowsheet Rows      Flowsheet Row First Filed Value   Admission Height 166.4 cm (65.5\") Documented at 02/06/2024 0627   Admission Weight 103 kg (227 lb 12.8 oz) Documented at 02/06/2024 0627            Physical Exam:   General Appearance:    Alert,oriented, in no acute distress   Lungs:     Clear to auscultation,BS are equal    Heart:    Normal S1 and S2, RRR without murmur, gallop or rub   HEENT:    Sclerae are clear, no JVD or adenopathy   Abdomen:     Normal bowel sounds, soft nontender, nondistended, no HSM   Extremities:   Moves all extremities well, no edema, no cyanosis, no             Redness, no rash     Medication Review:      atorvastatin, 40 mg, Oral, Nightly  cephalexin, 500 mg, Oral, TID  ferrous sulfate, 325 mg, Oral, BID With Meals  lisinopril, 40 mg, Oral, Daily  metoprolol succinate XL, 25 mg, Oral, Daily  multivitamin with minerals, 1 tablet, Oral, Daily      lactated ringers, 9 mL/hr          I reviewed the patient's new clinical results.  I personally viewed and interpreted the patient's EKG/Telemetry data    Assessment/Plan  Active Hospital Problems    Diagnosis  POA    **Coronary artery disease involving native coronary artery of native heart with angina pectoris [I25.119]  Unknown    Aortic stenosis, severe [I35.0]  Yes     Priority: High    Aortic stenosis [I35.0]  Yes      Resolved Hospital Problems   No resolved problems to display.       She is doing well her hemoglobin is down a little bit to " have a little bit of a hematoma in the left leg but I think it is okay and stable I would put her on some iron and a multivitamin for the next month.  Her echo looks great today the gradient mean is 20 which is okay.  I think she can be discharged see Rowan in a week and see me in a month    Vinod Gerber MD  02/07/24  10:52 EST

## 2024-02-07 NOTE — CONSULTS
Met with patient to discuss the benefits of cardiac rehab. Provided phase II information along with the contact information for cardiac rehab at [facility]. Pt repots this program is closer to her home and requested fro referral to be sent.

## 2024-02-07 NOTE — PROGRESS NOTES
" LOS: 1 day   Patient Care Team:  Provider, No Known as PCP - General    Chief Complaint: post op TAVR    Subjective:  Symptoms:  No shortness of breath, cough or chest pain.    Diet:  Adequate intake.  No nausea or vomiting.    Activity level: Returning to normal.    Pain:  She reports no pain.      Vital Signs  Temp:  [97.4 °F (36.3 °C)-98.5 °F (36.9 °C)] 98.4 °F (36.9 °C)  Heart Rate:  [] 110  Resp:  [12-20] 16  BP: ()/(56-91) 141/75  Body mass index is 36.64 kg/m².    Intake/Output Summary (Last 24 hours) at 2/7/2024 0827  Last data filed at 2/7/2024 0801  Gross per 24 hour   Intake 1580 ml   Output --   Net 1580 ml     I/O this shift:  In: 240 [P.O.:240]  Out: -           02/06/24 0627 02/07/24  0635   Weight: 103 kg (227 lb 12.8 oz) 103 kg (227 lb)         Objective:  General Appearance:  Comfortable and in no acute distress.    Vital signs: (most recent): Blood pressure 141/75, pulse 110, temperature 98.4 °F (36.9 °C), temperature source Oral, resp. rate 16, height 167.6 cm (66\"), weight 103 kg (227 lb), SpO2 99%.  Vital signs are normal.  No fever.    Output: Producing urine and no stool output.    Lungs:  Normal effort and normal respiratory rate.    Heart: Normal rate.  Regular rhythm.    Abdomen: Abdomen is soft.  Bowel sounds are normal.     Extremities: There is no dependent edema.    Pulses: Distal pulses are intact.    Neurological: Patient is alert and oriented to person, place and time.    Skin:  Warm and dry.  (Bilateral groin dressings clean and dry  Ecchymosis of the right groin)          Results Review:        WBC WBC   Date Value Ref Range Status   02/07/2024 7.69 3.40 - 10.80 10*3/mm3 Final      HGB Hemoglobin   Date Value Ref Range Status   02/07/2024 7.8 (L) 12.0 - 15.9 g/dL Final   02/06/2024 7.8 (C) 12.0 - 17.0 g/dL Final   02/06/2024 8.8 (L) 12.0 - 17.0 g/dL Final      HCT Hematocrit   Date Value Ref Range Status   02/07/2024 24.7 (L) 34.0 - 46.6 % Final   02/06/2024 23 (L) " "38 - 51 % Final   02/06/2024 26 (L) 38 - 51 % Final      Platelets Platelets   Date Value Ref Range Status   02/07/2024 192 140 - 450 10*3/mm3 Final        PT/INR:  No results found for: \"PROTIME\"/No results found for: \"INR\"    Sodium Sodium   Date Value Ref Range Status   02/07/2024 138 136 - 145 mmol/L Final      Potassium Potassium   Date Value Ref Range Status   02/07/2024 3.7 3.5 - 5.2 mmol/L Final      Chloride Chloride   Date Value Ref Range Status   02/07/2024 105 98 - 107 mmol/L Final      Bicarbonate CO2   Date Value Ref Range Status   02/07/2024 25.4 22.0 - 29.0 mmol/L Final      BUN BUN   Date Value Ref Range Status   02/07/2024 16 8 - 23 mg/dL Final      Creatinine Creatinine   Date Value Ref Range Status   02/07/2024 0.71 0.57 - 1.00 mg/dL Final      Calcium Calcium   Date Value Ref Range Status   02/07/2024 8.5 (L) 8.6 - 10.5 mg/dL Final      Magnesium No results found for: \"MG\"       atorvastatin, 40 mg, Oral, Nightly  cephalexin, 500 mg, Oral, TID  ferrous sulfate, 325 mg, Oral, BID With Meals  lisinopril, 40 mg, Oral, Daily  metoprolol succinate XL, 25 mg, Oral, Daily      lactated ringers, 9 mL/hr      Assessment & Plan  - severe aortic valve stenosis s/p TAVR POD#1 Yosi/Buzz  - chronic diastolic congestive heart failure; NYHA class III  - CAD s/p PCI in 2023  - hypertension  - hyperlipidemia  - breast cancer (left) s/p lumpectomy  - anemia--appears chronic in nature; worsened secondary to acute blood loss    Feeling good this morning   Weaned to RA and tolerating  Mobilize as able  Echocardiogram showed well functioning TAVR valve  Hgb 7.8 this morning. Will repeat H&H and start iron supplement. Will discuss resumption of ASA/Plavix with Dr. Gerber  Possible discharge home later today   Continue routine care    Jo Medina, TYREL  02/07/24  08:27 EST  "

## 2024-02-07 NOTE — DISCHARGE SUMMARY
Date of Admission: 2/6/2024  Date of Discharge:  2/7/2024    Discharge Diagnosis:   - severe aortic valve stenosis s/p TAVR   - chronic diastolic congestive heart failure; NYHA class III  - CAD s/p PCI in 2023  - hypertension  - hyperlipidemia  - breast cancer (left) s/p lumpectomy  - anemia--appears chronic in nature; worsened secondary to acute blood loss    Presenting Problem/History of Present Illness  Coronary artery disease involving native coronary artery of native heart with angina pectoris [I25.119]  Aortic stenosis, severe [I35.0]  Aortic stenosis [I35.0]     Hospital Course  Patient is a 78 y.o. female presented for previously scheduled cardiac surgery. On 2/6/24 she underwent transfemoral transcatheter aortic valve replacement with Dr. Deluca and Dr. Gerber (see op note for full report). Post-operatively she did well. After her procedure she had a small soft hematoma at the right groin site. Hemoglobin did drop some, but patient asymptomatic. This was partly thought to be dilutional given the amount of fluid the patient received, and she was given IV diuretics. Hold off on transfusion per Dr. Deluca. Patient started on multi-vitamin and iron. Plan to have a repeat CBC in 2 days. Discharge patient on Plavix only per Dr. Gerber. Post-operative echocardiogram showed well functioning TAVR valve. She is tolerating the current medication regimen, and her mobility is at baseline. She is eating and drinking sufficiently and urinating without issue. On POD#1 she was deemed ready for discharge home. She is to follow up with TYREL Davenport in 1 week, and Dr. Gerber in 1 month.        Procedures Performed  Procedure(s):  TTE TRANSFEMORAL TRANSCATHETER AORTIC VALVE REPLACEMENT PERCUTANEOUS APPROACH  Transfemoral Transcatheter Aortic Valve Replacement with intraoperative TTE and possible open surgical rescue       Consults:   Consults       No orders found for last 30 day(s).            Pertinent Test Results:    Lab  Results   Component Value Date    WBC 7.69 02/07/2024    HGB 7.6 (L) 02/07/2024    HCT 24.8 (L) 02/07/2024    MCV 78.2 (L) 02/07/2024     02/07/2024      Lab Results   Component Value Date    GLUCOSE 101 (H) 02/07/2024    CALCIUM 8.5 (L) 02/07/2024     02/07/2024    K 3.7 02/07/2024    CO2 25.4 02/07/2024     02/07/2024    BUN 16 02/07/2024    CREATININE 0.71 02/07/2024    BCR 22.5 02/07/2024    ANIONGAP 7.6 02/07/2024     Lab Results   Component Value Date    INR 1.03 02/02/2024    PROTIME 13.6 02/02/2024         Condition on Discharge:  stable    Vital Signs  Temp:  [97.7 °F (36.5 °C)-98.5 °F (36.9 °C)] 98.4 °F (36.9 °C)  Heart Rate:  [] 110  Resp:  [12-18] 16  BP: (116-162)/(56-91) 141/75      Discharge Disposition  Home or Self Care    Discharge Medications     Discharge Medications        New Medications        Instructions Start Date   ferrous sulfate 325 (65 FE) MG tablet   325 mg, Oral, 2 Times Daily With Meals      multivitamin with minerals tablet tablet   1 tablet, Oral, Daily   Start Date: February 8, 2024            Continue These Medications        Instructions Start Date   atorvastatin 40 MG tablet  Commonly known as: LIPITOR   40 mg, Oral, Nightly      cephalexin 500 MG capsule  Commonly known as: KEFLEX   500 mg, Oral, 3 Times Daily      clopidogrel 75 MG tablet  Commonly known as: PLAVIX   75 mg, Oral, Daily      hydroCHLOROthiazide 25 MG tablet  Commonly known as: HYDRODIURIL   25 mg, Oral, Daily PRN      lisinopril 40 MG tablet  Commonly known as: PRINIVIL,ZESTRIL   40 mg, Oral, Daily      metoprolol succinate XL 25 MG 24 hr tablet  Commonly known as: TOPROL-XL   25 mg, Oral, Daily      nitroglycerin 0.4 MG SL tablet  Commonly known as: NITROSTAT   0.4 mg, Sublingual, Every 5 Minutes PRN, Take no more than 3 doses in 15 minutes.             Stop These Medications      Aspirin Low Dose 81 MG EC tablet  Generic drug: aspirin              Discharge Diet:     Activity at  Discharge:   Activity Instructions       Activity as Tolerated      Measure Weight      Daily weight, notify if over 3 lbs in one day            Follow-up Appointments  Future Appointments   Date Time Provider Department Center   2/14/2024 11:00 AM Francine Tran APRN [facility]   3/19/2024  1:15 PM [facility]  [facility]   3/19/2024  2:00 PM Vinod Gerber MD [facility]   12/18/2024  1:15 PM [facility]  [facility]   12/18/2024  1:40 PM Vinod Gerber MD [facility]     Additional Instructions for the Follow-ups that You Need to Schedule       Ambulatory Referral to Cardiac Rehab   As directed      Discharge Follow-up with Specialty: Dr. Gerber; 1 Month   As directed      Specialty: Dr. Gerber   Follow Up: 1 Month        Discharge Follow-up with Specified Provider: TYREL Davenport; 1 Week   As directed      To: TYREL Davenport   Follow Up: 1 Week        CBC & Differential    Feb 09, 2024 (Approximate)      Manual Differential: No   Release to patient: Routine Release                Test Results Pending at Discharge       TYREL Onofre  02/07/24  12:15 EST

## 2024-02-19 ENCOUNTER — OFFICE VISIT (OUTPATIENT)
Dept: CARDIOLOGY | Facility: CLINIC | Age: 79
End: 2024-02-19
Payer: MEDICARE

## 2024-02-19 VITALS
HEART RATE: 112 BPM | DIASTOLIC BLOOD PRESSURE: 90 MMHG | SYSTOLIC BLOOD PRESSURE: 169 MMHG | OXYGEN SATURATION: 98 % | HEIGHT: 66 IN | WEIGHT: 209 LBS | BODY MASS INDEX: 33.59 KG/M2

## 2024-02-19 DIAGNOSIS — I10 PRIMARY HYPERTENSION: ICD-10-CM

## 2024-02-19 DIAGNOSIS — I35.0 NONRHEUMATIC AORTIC VALVE STENOSIS: Primary | ICD-10-CM

## 2024-02-19 DIAGNOSIS — I25.119 CORONARY ARTERY DISEASE INVOLVING NATIVE CORONARY ARTERY OF NATIVE HEART WITH ANGINA PECTORIS: ICD-10-CM

## 2024-02-19 DIAGNOSIS — E78.2 MIXED HYPERLIPIDEMIA: ICD-10-CM

## 2024-02-19 PROCEDURE — 1160F RVW MEDS BY RX/DR IN RCRD: CPT | Performed by: NURSE PRACTITIONER

## 2024-02-19 PROCEDURE — 99214 OFFICE O/P EST MOD 30 MIN: CPT | Performed by: NURSE PRACTITIONER

## 2024-02-19 PROCEDURE — 3077F SYST BP >= 140 MM HG: CPT | Performed by: NURSE PRACTITIONER

## 2024-02-19 PROCEDURE — 1159F MED LIST DOCD IN RCRD: CPT | Performed by: NURSE PRACTITIONER

## 2024-02-19 PROCEDURE — 93000 ELECTROCARDIOGRAM COMPLETE: CPT | Performed by: NURSE PRACTITIONER

## 2024-02-19 PROCEDURE — 3080F DIAST BP >= 90 MM HG: CPT | Performed by: NURSE PRACTITIONER

## 2024-02-19 RX ORDER — HYDROCHLOROTHIAZIDE 25 MG/1
25 TABLET ORAL DAILY PRN
Qty: 30 TABLET | Refills: 11 | Status: SHIPPED | OUTPATIENT
Start: 2024-02-19

## 2024-02-19 RX ORDER — NITROFURANTOIN 25; 75 MG/1; MG/1
100 CAPSULE ORAL 2 TIMES DAILY
Qty: 14 CAPSULE | Refills: 0 | Status: SHIPPED | OUTPATIENT
Start: 2024-02-19

## 2024-02-19 RX ORDER — PREDNISONE 20 MG/1
20 TABLET ORAL DAILY
Qty: 3 TABLET | Refills: 0 | Status: SHIPPED | OUTPATIENT
Start: 2024-02-19 | End: 2024-02-22

## 2024-02-19 RX ORDER — LISINOPRIL 40 MG/1
40 TABLET ORAL DAILY
Qty: 30 TABLET | Refills: 11 | Status: SHIPPED | OUTPATIENT
Start: 2024-02-19

## 2024-02-19 NOTE — PROGRESS NOTES
Date of Office Visit: 2024  Encounter Provider: TYREL Jones  Place of Service: [facility] CARDIOLOGY  Patient Name: Arlene Farrell  :1945    Chief Complaint   Patient presents with    Follow-up    Palpitations     SOA    Shortness of Breath   :     HPI: Arlene Farrell is a 78 y.o. female who is a patient of Dr. Gerber and is new to me today.  She [place] had gone [place] of shortness of breath.  She was noted to have a loud late peaking systolic murmur.  Echocardiogram showed severe aortic stenosis she was referred to us for right and left heart cath.  Cath showed severe aortic stenosis as well as a severe mid LAD lesion and she had angioplasty and stenting.  She was then referred for TAVR.    She was admitted on  for elected transcutaneous aortic valve replacement.  She underwent procedure without complication she received a number 26 mm Tate LAKEISHA transcatheter aortic valve.  Postop she did have a small hematoma but was stable.  She is here for follow-up today.    During her hospitalization she was diagnosed with a urinary tract infection.  She was given cephalexin.  It was positive for Klebsiella pneumoniae.  However she broke out in a rash from the antibiotic over her abdomen and chest.  It has been very itchy and she stopped that medication.  She is also been out of her hydrochlorothiazide and lisinopril.  She states she is still feeling short of breath blood pressure is 169/90 today.  Her right groin site has been draining some serosanguineous drainage periodically.    I have examined the procedure site.  Left groin is stable small palpable area from catheter.  No hematoma no signs of infection.  Right groin puncture site stable no signs of infection.  It is open.  Previous testing and notes have been reviewed by me.   Past Medical History:   Diagnosis Date    Angina pectoris     Aortic valve stenosis      Dyspnea on exertion     Heart valve disease     History of breast cancer     LEFT    HLD (hyperlipidemia) 12/22/2023    Hypertension 12/22/2023       Past Surgical History:   Procedure Laterality Date    BREAST LUMPECTOMY Left     CARDIAC CATHETERIZATION N/A 12/14/2023    Procedure: Left Heart Cath;  Surgeon: Vinod Gerber MD;  Location:  [facility] C[facility];  Service: Cardiovascular;  Laterality: N/A;    CARDIAC CATHETERIZATION N/A 12/14/2023    Procedure: Percutaneous Coronary Intervention;  Surgeon: Vinod Gerber MD;  Location:  [facility] C[facility];  Service: Cardiovascular;  Laterality: N/A;    CARDIAC CATHETERIZATION N/A 12/14/2023    Procedure: Stent AGUEDA coronary;  Surgeon: Vinod Gerber MD;  Location:  [facility];  Service: Cardiovascular;  Laterality: N/A;    CARDIAC CATHETERIZATION N/A 12/14/2023    Procedure: Coronary angiography;  Surgeon: Vinod Gerber MD;  Location:  [facility] C[facility]VAS[facility];  Service: Cardiovascular;  Laterality: N/A;    ORIF ANKLE FRACTURE Right        Social History     Socioeconomic History    Marital status:    Tobacco Use    Smoking status: Former     Packs/day: 1.00     Years: 3.00     Additional pack years: 0.00     Total pack years: 3.00     Types: Cigarettes    Smokeless tobacco: Never    Tobacco comments:     QUIT 50 YEARS AGO   Vaping Use    Vaping Use: Never used   Substance and Sexual Activity    Alcohol use: Yes     Comment: 'seldom'    Drug use: Not Currently    Sexual activity: Defer     Birth control/protection: Post-menopausal       Family History   Problem Relation Age of Onset    Malig Hyperthermia Neg Hx        Review of Systems   Constitutional: Negative for diaphoresis and malaise/fatigue.   Cardiovascular:  Negative for chest pain, claudication, dyspnea on exertion, irregular heartbeat, leg swelling, near-syncope, orthopnea, palpitations, paroxysmal nocturnal dyspnea and syncope.   Respiratory:   "Positive for shortness of breath. Negative for cough and sleep disturbances due to breathing.    Musculoskeletal:  Negative for falls.   Neurological:  Negative for dizziness and weakness.   Psychiatric/Behavioral:  Negative for altered mental status and substance abuse.        Allergies   Allergen Reactions    Wound Dressing Adhesive Rash         Current Outpatient Medications:     atorvastatin (LIPITOR) 40 MG tablet, Take 1 tablet by mouth Every Night., Disp: 90 tablet, Rfl: 0    clopidogrel (PLAVIX) 75 MG tablet, Take 1 tablet by mouth Daily., Disp: 90 tablet, Rfl: 3    ferrous sulfate 325 (65 FE) MG tablet, Take 1 tablet by mouth 2 (Two) Times a Day With Meals for 30 days., Disp: 60 tablet, Rfl: 0    hydroCHLOROthiazide (HYDRODIURIL) 25 MG tablet, Take 1 tablet by mouth Daily As Needed (swelling)., Disp: , Rfl:     lisinopril (PRINIVIL,ZESTRIL) 40 MG tablet, Take 1 tablet by mouth Daily., Disp: , Rfl:     metoprolol succinate XL (TOPROL-XL) 25 MG 24 hr tablet, Take 1 tablet by mouth Daily., Disp: 90 tablet, Rfl: 0    multivitamin (One-Daily Multi-Vitamin) tablet tablet, Take 1 tablet by mouth Daily for 60 days., Disp: 30 tablet, Rfl: 1    nitroglycerin (NITROSTAT) 0.4 MG SL tablet, Place 1 tablet under the tongue Every 5 (Five) Minutes As Needed for Chest Pain (Systolic BP Greater Than 100). Take no more than 3 doses in 15 minutes., Disp: 50 tablet, Rfl: 12      Objective:     Vitals:    02/19/24 1002   BP: 169/90   Pulse: 112   SpO2: 98%   Weight: 94.8 kg (209 lb)   Height: 167.6 cm (66\")     Body mass index is 33.73 kg/m².    PHYSICAL EXAM:    Constitutional:       General: Not in acute distress.     Appearance: Normal appearance. Well-developed.   Eyes:      Pupils: Pupils are equal, round, and reactive to light.   HENT:      Head: Normocephalic.   Neck:      Vascular: No carotid bruit or JVD.   Pulmonary:      Effort: Pulmonary effort is normal. No tachypnea.      Breath sounds: Normal breath sounds. No " wheezing. No rales.   Cardiovascular:      Normal rate. Regular rhythm.      Murmurs: There is a systolic murmur.      No gallop.    Pulses:     Intact distal pulses.   Edema:     Peripheral edema present.     Pretibial: bilateral trace edema of the pretibial area.     Ankle: bilateral trace edema of the ankle.     Feet: bilateral 1+ edema of the feet.  Abdominal:      General: Bowel sounds are normal.      Palpations: Abdomen is soft.      Tenderness: There is no abdominal tenderness.   Musculoskeletal: Normal range of motion.      Cervical back: Normal range of motion and neck supple. No edema. Skin:     General: Skin is warm and dry.   Neurological:      Mental Status: Alert and oriented to person, place, and time.           ECG 12 Lead    Date/Time: 2/19/2024 10:17 AM  Performed by: Francine Tran APRN    Authorized by: Francine Tran APRN  Comparison: compared with previous ECG from 2/6/2024  Similar to previous ECG  Rhythm: sinus rhythm  Rate: normal  Q waves: III    QRS axis: normal          Assessment:       Diagnosis Plan   1. Nonrheumatic aortic valve stenosis     Status post TAVR.  On aspirin and Plavix.  Procedure site discussed with her to clean use some triple antibiotic ointment and put a gauze in her groin fold to keep her underwear from rubbing the area.  Monitor for any further signs of infection or drainage.  Does not appear to have any infection at this time.   2. Coronary artery disease involving native coronary artery of native heart with angina pectoris     On aspirin and Plavix status post stent to the LAD in December 2023.  No angina symptoms   3. Mixed hyperlipidemia     Continue statin therapy goal LDL less than 70   4. Primary hypertension     Restart blood pressure medications today as well as diuretic.  Discussed with her that she will likely feel better restarting her water pill and to monitor her blood pressure at home.     No orders of the defined types were placed in this  encounter.         Plan:       Has follow-up in 1 month with Dr. Gerber and echocardiogram at that time.         Your medication list            Accurate as of February 19, 2024 10:17 AM. If you have any questions, ask your nurse or doctor.                CONTINUE taking these medications        Instructions Last Dose Given Next Dose Due   atorvastatin 40 MG tablet  Commonly known as: LIPITOR      Take 1 tablet by mouth Every Night.       clopidogrel 75 MG tablet  Commonly known as: PLAVIX      Take 1 tablet by mouth Daily.       ferrous sulfate 325 (65 FE) MG tablet      Take 1 tablet by mouth 2 (Two) Times a Day With Meals for 30 days.       hydroCHLOROthiazide 25 MG tablet      Take 1 tablet by mouth Daily As Needed (swelling).       lisinopril 40 MG tablet  Commonly known as: PRINIVIL,ZESTRIL      Take 1 tablet by mouth Daily.       metoprolol succinate XL 25 MG 24 hr tablet  Commonly known as: TOPROL-XL      Take 1 tablet by mouth Daily.       multivitamin tablet tablet      Take 1 tablet by mouth Daily for 60 days.       nitroglycerin 0.4 MG SL tablet  Commonly known as: NITROSTAT      Place 1 tablet under the tongue Every 5 (Five) Minutes As Needed for Chest Pain (Systolic BP Greater Than 100). Take no more than 3 doses in 15 minutes.                  As always, it has been a pleasure to participate in your patient's care.      Sincerely,     Francine SARAH

## 2024-03-22 DIAGNOSIS — E78.2 MIXED HYPERLIPIDEMIA: Primary | ICD-10-CM

## 2024-03-27 ENCOUNTER — LAB (OUTPATIENT)
Dept: LAB | Facility: HOSPITAL | Age: 79
End: 2024-03-27
Payer: MEDICARE

## 2024-03-27 ENCOUNTER — HOSPITAL ENCOUNTER (OUTPATIENT)
Dept: CARDIOLOGY | Facility: HOSPITAL | Age: 79
Discharge: HOME | End: 2024-03-27
Payer: MEDICARE

## 2024-03-27 ENCOUNTER — TELEPHONE (OUTPATIENT)
Dept: CARDIOLOGY | Facility: CLINIC | Age: 79
End: 2024-03-27
Payer: MEDICARE

## 2024-03-27 ENCOUNTER — HOSPITAL ENCOUNTER (OUTPATIENT)
Dept: GENERAL RADIOLOGY | Facility: HOSPITAL | Age: 79
Discharge: HOME | End: 2024-03-27
Payer: MEDICARE

## 2024-03-27 ENCOUNTER — OFFICE VISIT (OUTPATIENT)
Dept: CARDIOLOGY | Facility: CLINIC | Age: 79
End: 2024-03-27
Payer: MEDICARE

## 2024-03-27 VITALS
DIASTOLIC BLOOD PRESSURE: 100 MMHG | HEIGHT: 65 IN | SYSTOLIC BLOOD PRESSURE: 160 MMHG | BODY MASS INDEX: 36.89 KG/M2 | WEIGHT: 221.4 LBS | HEART RATE: 95 BPM

## 2024-03-27 VITALS
HEART RATE: 80 BPM | HEIGHT: 66 IN | SYSTOLIC BLOOD PRESSURE: 148 MMHG | BODY MASS INDEX: 33.59 KG/M2 | WEIGHT: 209 LBS | DIASTOLIC BLOOD PRESSURE: 90 MMHG

## 2024-03-27 DIAGNOSIS — Z95.2 S/P TAVR (TRANSCATHETER AORTIC VALVE REPLACEMENT): ICD-10-CM

## 2024-03-27 DIAGNOSIS — R00.2 PALPITATIONS: ICD-10-CM

## 2024-03-27 DIAGNOSIS — I35.0 NONRHEUMATIC AORTIC VALVE STENOSIS: ICD-10-CM

## 2024-03-27 DIAGNOSIS — I10 PRIMARY HYPERTENSION: ICD-10-CM

## 2024-03-27 DIAGNOSIS — R06.09 DOE (DYSPNEA ON EXERTION): ICD-10-CM

## 2024-03-27 DIAGNOSIS — E78.2 MIXED HYPERLIPIDEMIA: ICD-10-CM

## 2024-03-27 DIAGNOSIS — I25.119 CORONARY ARTERY DISEASE INVOLVING NATIVE CORONARY ARTERY OF NATIVE HEART WITH ANGINA PECTORIS: ICD-10-CM

## 2024-03-27 DIAGNOSIS — I35.0 NONRHEUMATIC AORTIC VALVE STENOSIS: Primary | ICD-10-CM

## 2024-03-27 LAB
ALBUMIN SERPL-MCNC: 4.1 G/DL (ref 3.5–5.2)
ALP SERPL-CCNC: 87 U/L (ref 39–117)
ALT SERPL W P-5'-P-CCNC: 12 U/L (ref 1–33)
ANION GAP SERPL CALCULATED.3IONS-SCNC: 12.5 MMOL/L (ref 5–15)
AST SERPL-CCNC: 20 U/L (ref 1–32)
BASOPHILS # BLD AUTO: 0.07 10*3/MM3 (ref 0–0.2)
BASOPHILS NFR BLD AUTO: 1 % (ref 0–1.5)
BILIRUB CONJ SERPL-MCNC: <0.2 MG/DL (ref 0–0.3)
BILIRUB INDIRECT SERPL-MCNC: NORMAL MG/DL
BILIRUB SERPL-MCNC: 0.4 MG/DL (ref 0–1.2)
BUN SERPL-MCNC: 23 MG/DL (ref 8–23)
BUN/CREAT SERPL: 24 (ref 7–25)
CALCIUM SPEC-SCNC: 9.4 MG/DL (ref 8.6–10.5)
CHLORIDE SERPL-SCNC: 99 MMOL/L (ref 98–107)
CHOLEST SERPL-MCNC: 172 MG/DL (ref 0–200)
CO2 SERPL-SCNC: 24.5 MMOL/L (ref 22–29)
CREAT SERPL-MCNC: 0.96 MG/DL (ref 0.57–1)
DEPRECATED RDW RBC AUTO: 41.8 FL (ref 37–54)
EGFRCR SERPLBLD CKD-EPI 2021: 61.1 ML/MIN/1.73
EOSINOPHIL # BLD AUTO: 0.05 10*3/MM3 (ref 0–0.4)
EOSINOPHIL NFR BLD AUTO: 0.7 % (ref 0.3–6.2)
ERYTHROCYTE [DISTWIDTH] IN BLOOD BY AUTOMATED COUNT: 15.1 % (ref 12.3–15.4)
GLUCOSE SERPL-MCNC: 96 MG/DL (ref 65–99)
HCT VFR BLD AUTO: 29.3 % (ref 34–46.6)
HDLC SERPL-MCNC: 60 MG/DL (ref 40–60)
HGB BLD-MCNC: 8.8 G/DL (ref 12–15.9)
IMM GRANULOCYTES # BLD AUTO: 0.02 10*3/MM3 (ref 0–0.05)
IMM GRANULOCYTES NFR BLD AUTO: 0.3 % (ref 0–0.5)
LDLC SERPL CALC-MCNC: 97 MG/DL (ref 0–100)
LDLC/HDLC SERPL: 1.59 {RATIO}
LYMPHOCYTES # BLD AUTO: 1.67 10*3/MM3 (ref 0.7–3.1)
LYMPHOCYTES NFR BLD AUTO: 24.1 % (ref 19.6–45.3)
MCH RBC QN AUTO: 23.2 PG (ref 26.6–33)
MCHC RBC AUTO-ENTMCNC: 30 G/DL (ref 31.5–35.7)
MCV RBC AUTO: 77.3 FL (ref 79–97)
MONOCYTES # BLD AUTO: 0.59 10*3/MM3 (ref 0.1–0.9)
MONOCYTES NFR BLD AUTO: 8.5 % (ref 5–12)
NEUTROPHILS NFR BLD AUTO: 4.54 10*3/MM3 (ref 1.7–7)
NEUTROPHILS NFR BLD AUTO: 65.4 % (ref 42.7–76)
NRBC BLD AUTO-RTO: 0 /100 WBC (ref 0–0.2)
NT-PROBNP SERPL-MCNC: 163 PG/ML (ref 0–1800)
PLATELET # BLD AUTO: 290 10*3/MM3 (ref 140–450)
PMV BLD AUTO: 10.4 FL (ref 6–12)
POTASSIUM SERPL-SCNC: 3.8 MMOL/L (ref 3.5–5.2)
PROT SERPL-MCNC: 7.6 G/DL (ref 6–8.5)
RBC # BLD AUTO: 3.79 10*6/MM3 (ref 3.77–5.28)
SODIUM SERPL-SCNC: 136 MMOL/L (ref 136–145)
TRIGL SERPL-MCNC: 83 MG/DL (ref 0–150)
VLDLC SERPL-MCNC: 15 MG/DL (ref 5–40)
WBC NRBC COR # BLD AUTO: 6.94 10*3/MM3 (ref 3.4–10.8)

## 2024-03-27 PROCEDURE — 83880 ASSAY OF NATRIURETIC PEPTIDE: CPT

## 2024-03-27 PROCEDURE — 80076 HEPATIC FUNCTION PANEL: CPT

## 2024-03-27 PROCEDURE — 85025 COMPLETE CBC W/AUTO DIFF WBC: CPT

## 2024-03-27 PROCEDURE — 80048 BASIC METABOLIC PNL TOTAL CA: CPT

## 2024-03-27 PROCEDURE — 36415 COLL VENOUS BLD VENIPUNCTURE: CPT

## 2024-03-27 PROCEDURE — 71046 X-RAY EXAM CHEST 2 VIEWS: CPT

## 2024-03-27 PROCEDURE — 80061 LIPID PANEL: CPT

## 2024-03-27 PROCEDURE — 93306 TTE W/DOPPLER COMPLETE: CPT

## 2024-03-27 RX ORDER — METOPROLOL SUCCINATE 50 MG/1
50 TABLET, EXTENDED RELEASE ORAL DAILY
Qty: 90 TABLET | Refills: 3 | Status: SHIPPED | OUTPATIENT
Start: 2024-03-27

## 2024-03-27 NOTE — PROGRESS NOTES
Date of Office Visit: 24  Encounter Provider: Vinod Gerber MD  Place of Service: [facility] CARDIOLOGY  Patient Name: Arlene Farrell  :1945  [id number]    Chief Complaint   Patient presents with    Aortic Stenosis        HPI: Arlene Farrell is a 78 y.o. female  She is here for 1 month follow-up after TAVR.  She usually follows at the [facility] a heart clinic.  We saw her for her severe aortic stenosis that they diagnosed.  She ended up having a left heart cath preoperatively which showed critical disease in her mid LAD she ended up having angioplasty and drug-eluting stenting to the LAD and then subsequently in 2024 had a #26 Tate Jose transaortic valve implanted without difficulty..  She is here for follow-up today.  She complains of dyspnea on exertion since the TAVR she does feel better overall no angina anymore little less fatigue but I think she was hoping that her breathing would be better.  She does not have PND does not have orthopnea does have a little edema right greater than left which is always been the case.  Her blood pressure is little bit high today her heart rate is a little fast today to.  She has not any bleeding difficulty has not any cough fevers chills    Past Medical History:   Diagnosis Date    Angina pectoris     Aortic valve stenosis     Dyspnea on exertion     Heart valve disease     History of breast cancer     LEFT    HLD (hyperlipidemia) 2023    Hypertension 2023       Past Surgical History:   Procedure Laterality Date    AORTIC VALVE REPAIR/REPLACEMENT N/A 2024    Procedure: TTE TRANSFEMORAL TRANSCATHETER AORTIC VALVE REPLACEMENT PERCUTANEOUS APPROACH;  Surgeon: Franki Deluca MD;  Location: [facility];  Service: Cardiothoracic;  Laterality: N/A;    AORTIC VALVE REPAIR/REPLACEMENT N/A 2024    Procedure: Transfemoral Transcatheter Aortic Valve Replacement with intraoperative TTE and possible open surgical  rescue;  Surgeon: Vinod Gerber MD;  Location: [facility] CVOR;  Service: Cardiovascular;  Laterality: N/A;    BREAST LUMPECTOMY Left     CARDIAC CATHETERIZATION N/A 12/14/2023    Procedure: Left Heart Cath;  Surgeon: Vinod Gerber MD;  Location: [facility] CATH INVASIVE LOCATION;  Service: Cardiovascular;  Laterality: N/A;    CARDIAC CATHETERIZATION N/A 12/14/2023    Procedure: Percutaneous Coronary Intervention;  Surgeon: Vinod Gerber MD;  Location: [facility] CATH INVASIVE LOCATION;  Service: Cardiovascular;  Laterality: N/A;    CARDIAC CATHETERIZATION N/A 12/14/2023    Procedure: Stent AGUEDA coronary;  Surgeon: Vinod Gerber MD;  Location: [facility] CATH INVASIVE LOCATION;  Service: Cardiovascular;  Laterality: N/A;    CARDIAC CATHETERIZATION N/A 12/14/2023    Procedure: Coronary angiography;  Surgeon: Vinod Gerber MD;  Location: [facility] CATH INVASIVE LOCATION;  Service: Cardiovascular;  Laterality: N/A;    ORIF ANKLE FRACTURE Right        Social History     Socioeconomic History    Marital status:    Tobacco Use    Smoking status: Former     Current packs/day: 1.00     Average packs/day: 1 pack/day for 3.0 years (3.0 ttl pk-yrs)     Types: Cigarettes    Smokeless tobacco: Never    Tobacco comments:     QUIT 50 YEARS AGO   Vaping Use    Vaping status: Never Used   Substance and Sexual Activity    Alcohol use: Not Currently     Comment: 'seldom'    Drug use: Not Currently    Sexual activity: Defer     Birth control/protection: Post-menopausal       Family History   Problem Relation Age of Onset    Malig Hyperthermia Neg Hx        Review of Systems   Constitutional: Negative for decreased appetite, fever, malaise/fatigue and weight loss.   HENT:  Negative for nosebleeds.    Eyes:  Negative for double vision.   Cardiovascular:  Negative for chest pain, claudication, cyanosis, dyspnea on exertion, irregular heartbeat, leg swelling, near-syncope, orthopnea, palpitations, paroxysmal nocturnal dyspnea and  "syncope.   Respiratory:  Negative for cough, hemoptysis and shortness of breath.    Hematologic/Lymphatic: Negative for bleeding problem.   Skin:  Negative for rash.   Musculoskeletal:  Negative for falls and myalgias.   Gastrointestinal:  Negative for hematochezia, jaundice, melena, nausea and vomiting.   Genitourinary:  Negative for hematuria.   Neurological:  Negative for dizziness and seizures.   Psychiatric/Behavioral:  Negative for altered mental status and memory loss.        Allergies   Allergen Reactions    Wound Dressing Adhesive Rash         Current Outpatient Medications:     atorvastatin (LIPITOR) 40 MG tablet, Take 1 tablet by mouth Every Night., Disp: 90 tablet, Rfl: 0    clopidogrel (PLAVIX) 75 MG tablet, Take 1 tablet by mouth Daily., Disp: 90 tablet, Rfl: 3    hydroCHLOROthiazide 25 MG tablet, Take 1 tablet by mouth Daily As Needed (swelling)., Disp: 30 tablet, Rfl: 11    lisinopril (PRINIVIL,ZESTRIL) 40 MG tablet, Take 1 tablet by mouth Daily., Disp: 30 tablet, Rfl: 11    metoprolol succinate XL (TOPROL-XL) 25 MG 24 hr tablet, Take 1 tablet by mouth Daily., Disp: 90 tablet, Rfl: 0    multivitamin (One-Daily Multi-Vitamin) tablet tablet, Take 1 tablet by mouth Daily for 60 days., Disp: 30 tablet, Rfl: 1    nitroglycerin (NITROSTAT) 0.4 MG SL tablet, Place 1 tablet under the tongue Every 5 (Five) Minutes As Needed for Chest Pain (Systolic BP Greater Than 100). Take no more than 3 doses in 15 minutes., Disp: 50 tablet, Rfl: 12    nitrofurantoin, macrocrystal-monohydrate, (Macrobid) 100 MG capsule, Take 1 capsule by mouth 2 (Two) Times a Day. (Patient not taking: Reported on 3/27/2024), Disp: 14 capsule, Rfl: 0      Objective:     Vitals:    03/27/24 0923   BP: 160/100   Pulse: 95   Weight: 100 kg (221 lb 6.4 oz)   Height: 163.8 cm (64.5\")     Body mass index is 37.42 kg/m².    Constitutional:       Appearance: Well-developed.   Eyes:      General: No scleral icterus.  HENT:      Head: Normocephalic. "   Neck:      Thyroid: No thyromegaly.      Vascular: No JVD.      Lymphadenopathy: No cervical adenopathy.   Pulmonary:      Effort: Pulmonary effort is normal.      Breath sounds: Normal breath sounds. No wheezing. No rales.   Cardiovascular:      Normal rate. Regular rhythm.      No gallop.    Edema:     Peripheral edema absent.   Abdominal:      Palpations: Abdomen is soft.      Tenderness: There is no abdominal tenderness.   Musculoskeletal: Normal range of motion. Skin:     General: Skin is warm and dry.      Findings: No rash.   Neurological:      Mental Status: Alert and oriented to person, place, and time.           ECG 12 Lead    Date/Time: 3/27/2024 10:03 AM  Performed by: Vinod Gerber MD    Authorized by: Vinod Gerber MD  Comparison: compared with previous ECG   Rhythm: sinus rhythm  Other findings: left ventricular hypertrophy    Clinical impression: abnormal EKG           Assessment:       Diagnosis Plan   1. Nonrheumatic aortic valve stenosis        2. Primary hypertension        3. Coronary artery disease involving native coronary artery of native heart with angina pectoris               Plan:       Well still disappointing that she show short of breath after her TAVR.  But when we look at her echo and listen to her heart she is very hyperdynamic.  And I imagine she has diastolic dysfunction in addition she is saying that she has periods of time where she has palpitations I do not see her being able to tolerate A-fib very well.  So I am going to increase her metoprolol to 50 and I went to send her over to get some blood work and a chest film.  And then we will make further decisions at that time.  I have reviewed her echo personally and it looks good the valve looks good but like I said her LV is very hyperdynamic I will have her come back and see me in a month    No follow-ups on file.     As always, it has been a pleasure to participate in your patient's care.      Sincerely,       Vinod  MD Buzz

## 2024-03-27 NOTE — TELEPHONE ENCOUNTER
----- Message from TYREL Locke sent at 3/27/2024  3:49 PM EDT -----  Please let her know the LDL cholesterol is 97 which is greater than the goal of 70.  Would recommend increasing the atorvastatin to 80 mg and repeating labs in 3 months.

## 2024-03-27 NOTE — PROGRESS NOTES
Please let her know the LDL cholesterol is 97 which is greater than the goal of 70.  Would recommend increasing the atorvastatin to 80 mg and repeating labs in 3 months.

## 2024-03-27 NOTE — TELEPHONE ENCOUNTER
Called and left VM. Will continue to try to reach patient. HUB transfer call to triage.     Linda Hidalgo RN  Triage [facility]

## 2024-03-28 NOTE — TELEPHONE ENCOUNTER
Spoke with pt, she verbalized understanding of test results.  She will start taking atorvastatin 80mg nightly and will call when her prescription is running low.

## 2024-03-29 LAB
AORTIC ARCH: 3.2 CM
AORTIC DIMENSIONLESS INDEX: 0.4 (DI)
ASCENDING AORTA: 4.2 CM
BH CV ECHO AV AORTIC VALVE AT ACCEL TIME CALCULATED: NORMAL MSEC
BH CV ECHO MEAS - ACS: 2 CM
BH CV ECHO MEAS - AO MAX PG: 30.1 MMHG
BH CV ECHO MEAS - AO MEAN PG: 16.6 MMHG
BH CV ECHO MEAS - AO ROOT DIAM: 2.8 CM
BH CV ECHO MEAS - AO V2 MAX: 274.4 CM/SEC
BH CV ECHO MEAS - AO V2 VTI: 43 CM
BH CV ECHO MEAS - AT: 80 SEC
BH CV ECHO MEAS - AVA(I,D): 1.06 CM2
BH CV ECHO MEAS - EDV(CUBED): 74.1 ML
BH CV ECHO MEAS - EDV(MOD-SP2): 50 ML
BH CV ECHO MEAS - EDV(MOD-SP4): 44 ML
BH CV ECHO MEAS - EF(MOD-BP): 65.2 %
BH CV ECHO MEAS - EF(MOD-SP2): 68 %
BH CV ECHO MEAS - EF(MOD-SP4): 61.4 %
BH CV ECHO MEAS - ESV(CUBED): 21.7 ML
BH CV ECHO MEAS - ESV(MOD-SP2): 16 ML
BH CV ECHO MEAS - ESV(MOD-SP4): 17 ML
BH CV ECHO MEAS - FS: 33.6 %
BH CV ECHO MEAS - IVS/LVPW: 1 CM
BH CV ECHO MEAS - IVSD: 1.6 CM
BH CV ECHO MEAS - LAT PEAK E' VEL: 6.7 CM/SEC
BH CV ECHO MEAS - LV DIASTOLIC VOL/BSA (35-75): 21.6 CM2
BH CV ECHO MEAS - LV MASS(C)D: 276.1 GRAMS
BH CV ECHO MEAS - LV MAX PG: 3.2 MMHG
BH CV ECHO MEAS - LV MEAN PG: 2 MMHG
BH CV ECHO MEAS - LV SYSTOLIC VOL/BSA (12-30): 8.3 CM2
BH CV ECHO MEAS - LV V1 MAX: 89.5 CM/SEC
BH CV ECHO MEAS - LV V1 VTI: 14.9 CM
BH CV ECHO MEAS - LVIDD: 4.2 CM
BH CV ECHO MEAS - LVIDS: 2.8 CM
BH CV ECHO MEAS - LVOT AREA: 3 CM2
BH CV ECHO MEAS - LVOT DIAM: 1.97 CM
BH CV ECHO MEAS - LVPWD: 1.6 CM
BH CV ECHO MEAS - MED PEAK E' VEL: 5 CM/SEC
BH CV ECHO MEAS - MV A DUR: 0.11 SEC
BH CV ECHO MEAS - MV A MAX VEL: 117 CM/SEC
BH CV ECHO MEAS - MV DEC SLOPE: 750.1 CM/SEC2
BH CV ECHO MEAS - MV DEC TIME: 0.15 SEC
BH CV ECHO MEAS - MV E MAX VEL: 69 CM/SEC
BH CV ECHO MEAS - MV E/A: 0.59
BH CV ECHO MEAS - MV MAX PG: 7 MMHG
BH CV ECHO MEAS - MV MEAN PG: 2.9 MMHG
BH CV ECHO MEAS - MV P1/2T: 29.9 MSEC
BH CV ECHO MEAS - MV V2 VTI: 22.9 CM
BH CV ECHO MEAS - MVA(P1/2T): 7.4 CM2
BH CV ECHO MEAS - MVA(VTI): 1.98 CM2
BH CV ECHO MEAS - PA ACC TIME: 0.09 SEC
BH CV ECHO MEAS - PA V2 MAX: 131.7 CM/SEC
BH CV ECHO MEAS - PULM A REVS DUR: 0.11 SEC
BH CV ECHO MEAS - PULM A REVS VEL: 34.7 CM/SEC
BH CV ECHO MEAS - PULM DIAS VEL: 37.7 CM/SEC
BH CV ECHO MEAS - PULM S/D: 1.5
BH CV ECHO MEAS - PULM SYS VEL: 56.6 CM/SEC
BH CV ECHO MEAS - QP/QS: 0.77
BH CV ECHO MEAS - RV MAX PG: 1.88 MMHG
BH CV ECHO MEAS - RV V1 MAX: 68.6 CM/SEC
BH CV ECHO MEAS - RV V1 VTI: 11.3 CM
BH CV ECHO MEAS - RVOT DIAM: 1.99 CM
BH CV ECHO MEAS - SI(MOD-SP2): 16.7 ML/M2
BH CV ECHO MEAS - SI(MOD-SP4): 13.2 ML/M2
BH CV ECHO MEAS - SUP REN AO DIAM: 2.2 CM
BH CV ECHO MEAS - SV(LVOT): 45.4 ML
BH CV ECHO MEAS - SV(MOD-SP2): 34 ML
BH CV ECHO MEAS - SV(MOD-SP4): 27 ML
BH CV ECHO MEAS - SV(RVOT): 35.1 ML
BH CV ECHO MEAS - TAPSE (>1.6): 2.9 CM
BH CV ECHO MEASUREMENTS AVERAGE E/E' RATIO: 11.79
BH CV XLRA - RV BASE: 3.5 CM
BH CV XLRA - RV LENGTH: 6.2 CM
BH CV XLRA - RV MID: 3.3 CM
BH CV XLRA - TDI S': 19.6 CM/SEC
LEFT ATRIUM VOLUME INDEX: 32.7 ML/M2
SINUS: 3.5 CM
STJ: 3 CM

## 2024-04-24 ENCOUNTER — OFFICE VISIT (OUTPATIENT)
Dept: CARDIOLOGY | Facility: CLINIC | Age: 79
End: 2024-04-24
Payer: MEDICARE

## 2024-04-24 VITALS
BODY MASS INDEX: 38.29 KG/M2 | HEIGHT: 65 IN | WEIGHT: 229.8 LBS | DIASTOLIC BLOOD PRESSURE: 98 MMHG | HEART RATE: 87 BPM | SYSTOLIC BLOOD PRESSURE: 186 MMHG

## 2024-04-24 DIAGNOSIS — Z95.2 S/P TAVR (TRANSCATHETER AORTIC VALVE REPLACEMENT): Primary | ICD-10-CM

## 2024-04-24 DIAGNOSIS — I10 PRIMARY HYPERTENSION: ICD-10-CM

## 2024-04-24 DIAGNOSIS — I25.119 CORONARY ARTERY DISEASE INVOLVING NATIVE CORONARY ARTERY OF NATIVE HEART WITH ANGINA PECTORIS: ICD-10-CM

## 2024-04-24 DIAGNOSIS — E78.5 HYPERLIPIDEMIA, UNSPECIFIED HYPERLIPIDEMIA TYPE: ICD-10-CM

## 2024-04-24 DIAGNOSIS — R06.09 DOE (DYSPNEA ON EXERTION): ICD-10-CM

## 2024-04-24 RX ORDER — LISINOPRIL 40 MG/1
40 TABLET ORAL DAILY
Qty: 90 TABLET | Refills: 3 | Status: SHIPPED | OUTPATIENT
Start: 2024-04-24

## 2024-04-24 RX ORDER — CLOPIDOGREL BISULFATE 75 MG/1
75 TABLET ORAL DAILY
Qty: 90 TABLET | Refills: 3 | Status: SHIPPED | OUTPATIENT
Start: 2024-04-24

## 2024-04-24 RX ORDER — HYDROCHLOROTHIAZIDE 25 MG/1
25 TABLET ORAL DAILY PRN
Qty: 90 TABLET | Refills: 3 | Status: SHIPPED | OUTPATIENT
Start: 2024-04-24

## 2024-04-24 NOTE — PROGRESS NOTES
Date of Office Visit: 24  Encounter Provider: Vinod Gerber MD  Place of Service: [facility] CARDIOLOGY  Patient Name: Arlene Farrell  :1945  [id number]    Chief Complaint   Patient presents with    Aortic Stenosis        HPI: Arlene Farrell is a 79 y.o. female  She is here for 1 month follow-up after TAVR.  She usually follows at the [facility] a heart clinic.  We saw her for her severe aortic stenosis that they diagnosed.  She ended up having a left heart cath preoperatively which showed critical disease in her mid LAD she ended up having angioplasty and drug-eluting stenting to the LAD and then subsequently in 2024 had a #26 Tate Jose transaortic valve implanted without difficulty.  We saw her last month and she was short of breath and echo looked good.  A chest film looked good her proBNP was normal her hemoglobin was 8 8 with an MCV of 77 and I recommended she get in with her primary care doctor to evaluate that.      She is here for follow-up today.  She feels terrible with beta blocker.  Making her more sob.  She has run out of some of her meds.  She otherwise has not really changed she has not gotten in with her primary care doctors about the anemia.        Past Medical History:   Diagnosis Date    Angina pectoris     Aortic valve stenosis     Dyspnea on exertion     Heart valve disease     History of breast cancer     LEFT    HLD (hyperlipidemia) 2023    Hypertension 2023       Past Surgical History:   Procedure Laterality Date    AORTIC VALVE REPAIR/REPLACEMENT N/A 2024    Procedure: TTE TRANSFEMORAL TRANSCATHETER AORTIC VALVE REPLACEMENT PERCUTANEOUS APPROACH;  Surgeon: Franki Deluca MD;  Location: [facility];  Service: Cardiothoracic;  Laterality: N/A;    AORTIC VALVE REPAIR/REPLACEMENT N/A 2024    Procedure: Transfemoral Transcatheter Aortic Valve Replacement with intraoperative TTE and possible open surgical rescue;  Surgeon:  Vinod Gerber MD;  Location: [facility] CVOR;  Service: Cardiovascular;  Laterality: N/A;    BREAST LUMPECTOMY Left     CARDIAC CATHETERIZATION N/A 12/14/2023    Procedure: Left Heart Cath;  Surgeon: Vinod Gerber MD;  Location: [facility] CATH INVASIVE LOCATION;  Service: Cardiovascular;  Laterality: N/A;    CARDIAC CATHETERIZATION N/A 12/14/2023    Procedure: Percutaneous Coronary Intervention;  Surgeon: Vinod Gerber MD;  Location: [facility] CATH INVASIVE LOCATION;  Service: Cardiovascular;  Laterality: N/A;    CARDIAC CATHETERIZATION N/A 12/14/2023    Procedure: Stent AGUEDA coronary;  Surgeon: Vinod Gerber MD;  Location: [facility] CATH INVASIVE LOCATION;  Service: Cardiovascular;  Laterality: N/A;    CARDIAC CATHETERIZATION N/A 12/14/2023    Procedure: Coronary angiography;  Surgeon: Vinod Gerber MD;  Location: [facility] CATH INVASIVE LOCATION;  Service: Cardiovascular;  Laterality: N/A;    ORIF ANKLE FRACTURE Right        Social History     Socioeconomic History    Marital status:    Tobacco Use    Smoking status: Former     Current packs/day: 1.00     Average packs/day: 1 pack/day for 3.0 years (3.0 ttl pk-yrs)     Types: Cigarettes    Smokeless tobacco: Never    Tobacco comments:     QUIT 50 YEARS AGO   Vaping Use    Vaping status: Never Used   Substance and Sexual Activity    Alcohol use: Not Currently     Comment: 'seldom'    Drug use: Not Currently    Sexual activity: Defer     Birth control/protection: Post-menopausal       Family History   Problem Relation Age of Onset    Malig Hyperthermia Neg Hx        Review of Systems   Constitutional: Negative for decreased appetite, fever, malaise/fatigue and weight loss.   HENT:  Negative for nosebleeds.    Eyes:  Negative for double vision.   Cardiovascular:  Negative for chest pain, claudication, cyanosis, dyspnea on exertion, irregular heartbeat, leg swelling, near-syncope, orthopnea, palpitations, paroxysmal nocturnal dyspnea and syncope.   Respiratory:   "Negative for cough, hemoptysis and shortness of breath.    Hematologic/Lymphatic: Negative for bleeding problem.   Skin:  Negative for rash.   Musculoskeletal:  Negative for falls and myalgias.   Gastrointestinal:  Negative for hematochezia, jaundice, melena, nausea and vomiting.   Genitourinary:  Negative for hematuria.   Neurological:  Negative for dizziness and seizures.   Psychiatric/Behavioral:  Negative for altered mental status and memory loss.        Allergies   Allergen Reactions    Wound Dressing Adhesive Rash         Current Outpatient Medications:     atorvastatin (LIPITOR) 40 MG tablet, Take 1 tablet by mouth Every Night., Disp: 90 tablet, Rfl: 0    clopidogrel (PLAVIX) 75 MG tablet, Take 1 tablet by mouth Daily., Disp: 90 tablet, Rfl: 3    hydroCHLOROthiazide 25 MG tablet, Take 1 tablet by mouth Daily As Needed (swelling)., Disp: 30 tablet, Rfl: 11    lisinopril (PRINIVIL,ZESTRIL) 40 MG tablet, Take 1 tablet by mouth Daily., Disp: 30 tablet, Rfl: 11    metoprolol succinate XL (TOPROL-XL) 50 MG 24 hr tablet, Take 1 tablet by mouth Daily., Disp: 90 tablet, Rfl: 3    nitrofurantoin, macrocrystal-monohydrate, (Macrobid) 100 MG capsule, Take 1 capsule by mouth 2 (Two) Times a Day., Disp: 14 capsule, Rfl: 0    nitroglycerin (NITROSTAT) 0.4 MG SL tablet, Place 1 tablet under the tongue Every 5 (Five) Minutes As Needed for Chest Pain (Systolic BP Greater Than 100). Take no more than 3 doses in 15 minutes., Disp: 50 tablet, Rfl: 12      Objective:     Vitals:    04/24/24 1454   BP: (!) 186/98   Pulse: 87   Weight: 104 kg (229 lb 12.8 oz)   Height: 163.8 cm (64.5\")     Body mass index is 38.84 kg/m².    Constitutional:       Appearance: Well-developed.   Eyes:      General: No scleral icterus.  HENT:      Head: Normocephalic.   Neck:      Thyroid: No thyromegaly.      Vascular: No JVD.      Lymphadenopathy: No cervical adenopathy.   Pulmonary:      Effort: Pulmonary effort is normal.      Breath sounds: Normal " breath sounds. No wheezing. No rales.   Cardiovascular:      Normal rate. Regular rhythm.      No gallop.    Edema:     Peripheral edema absent.   Abdominal:      Palpations: Abdomen is soft.      Tenderness: There is no abdominal tenderness.   Musculoskeletal: Normal range of motion. Skin:     General: Skin is warm and dry.      Findings: No rash.   Neurological:      Mental Status: Alert and oriented to person, place, and time.           ECG 12 Lead    Date/Time: 4/24/2024 3:42 PM  Performed by: Vinod Gerber MD    Authorized by: Vinod Gerber MD  Comparison: compared with previous ECG   Similar to previous ECG  Rhythm: sinus rhythm    Clinical impression: normal ECG           Assessment:       Diagnosis Plan   1. S/P TAVR (transcatheter aortic valve replacement)        2. EATON (dyspnea on exertion)        3. Coronary artery disease involving native coronary artery of native heart with angina pectoris        4. Primary hypertension        5. Hyperlipidemia, unspecified hyperlipidemia type                 Plan:       While I am going to stop the metoprolol because she feels terrible on it I am to put her on verapamil I counseled her to watch out for constipation.  She has a very hyperdynamic left ventricle I am going to also get her back on her other blood pressure medicines and her clopidogrel and I have encouraged her to get in and see her primary care docs about her anemia.  I would have her see Rowan in 6 weeks follow-up with the blood pressure    No follow-ups on file.     As always, it has been a pleasure to participate in your patient's care.      Sincerely,       Vinod Gerber MD

## 2024-12-17 ENCOUNTER — TELEPHONE (OUTPATIENT)
Dept: CARDIOLOGY | Facility: CLINIC | Age: 79
End: 2024-12-17
Payer: MEDICARE

## 2024-12-20 ENCOUNTER — APPOINTMENT (OUTPATIENT)
Dept: GENERAL RADIOLOGY | Facility: HOSPITAL | Age: 79
End: 2024-12-20
Payer: MEDICARE

## 2024-12-20 ENCOUNTER — HOSPITAL ENCOUNTER (INPATIENT)
Facility: HOSPITAL | Age: 79
LOS: 3 days | Discharge: HOME | End: 2024-12-23
Attending: EMERGENCY MEDICINE | Admitting: HOSPITALIST
Payer: MEDICARE

## 2024-12-20 DIAGNOSIS — D50.0 IRON DEFICIENCY ANEMIA DUE TO CHRONIC BLOOD LOSS: Primary | ICD-10-CM

## 2024-12-20 DIAGNOSIS — D64.9 SYMPTOMATIC ANEMIA: ICD-10-CM

## 2024-12-20 DIAGNOSIS — Z95.5 STATUS POST INSERTION OF DRUG ELUTING CORONARY ARTERY STENT: ICD-10-CM

## 2024-12-20 DIAGNOSIS — Z95.2 S/P TAVR (TRANSCATHETER AORTIC VALVE REPLACEMENT): ICD-10-CM

## 2024-12-20 DIAGNOSIS — E87.70 HYPERVOLEMIA, UNSPECIFIED HYPERVOLEMIA TYPE: ICD-10-CM

## 2024-12-20 LAB
ABO GROUP BLD: NORMAL
ALBUMIN SERPL-MCNC: 3.3 G/DL (ref 3.5–5.2)
ALBUMIN/GLOB SERPL: 1.1 G/DL
ALP SERPL-CCNC: 74 U/L (ref 39–117)
ALT SERPL W P-5'-P-CCNC: 10 U/L (ref 1–33)
ANION GAP SERPL CALCULATED.3IONS-SCNC: 9 MMOL/L (ref 5–15)
AST SERPL-CCNC: 17 U/L (ref 1–32)
BASOPHILS # BLD MANUAL: 0.07 10*3/MM3 (ref 0–0.2)
BASOPHILS NFR BLD MANUAL: 1 % (ref 0–1.5)
BILIRUB SERPL-MCNC: 0.4 MG/DL (ref 0–1.2)
BLD GP AB SCN SERPL QL: NEGATIVE
BUN SERPL-MCNC: 17 MG/DL (ref 8–23)
BUN/CREAT SERPL: 18.7 (ref 7–25)
CALCIUM SPEC-SCNC: 8.6 MG/DL (ref 8.6–10.5)
CHLORIDE SERPL-SCNC: 106 MMOL/L (ref 98–107)
CO2 SERPL-SCNC: 21 MMOL/L (ref 22–29)
CREAT SERPL-MCNC: 0.91 MG/DL (ref 0.57–1)
DAT POLY-SP REAG RBC QL: NEGATIVE
DEPRECATED RDW RBC AUTO: 38.3 FL (ref 37–54)
EGFRCR SERPLBLD CKD-EPI 2021: 64.7 ML/MIN/1.73
EOSINOPHIL # BLD MANUAL: 0.07 10*3/MM3 (ref 0–0.4)
EOSINOPHIL NFR BLD MANUAL: 1 % (ref 0.3–6.2)
ERYTHROCYTE [DISTWIDTH] IN BLOOD BY AUTOMATED COUNT: 15.4 % (ref 12.3–15.4)
FOLATE SERPL-MCNC: 18.4 NG/ML (ref 4.78–24.2)
GEN 5 1HR TROPONIN T REFLEX: 13 NG/L
GLOBULIN UR ELPH-MCNC: 2.9 GM/DL
GLUCOSE SERPL-MCNC: 102 MG/DL (ref 65–99)
HAPTOGLOB SERPL-MCNC: 90 MG/DL (ref 30–200)
HBA1C MFR BLD: 6 % (ref 4.8–5.6)
HCT VFR BLD AUTO: 21 % (ref 34–46.6)
HGB BLD-MCNC: 6.1 G/DL (ref 12–15.9)
HOLD SPECIMEN: NORMAL
HYPOCHROMIA BLD QL: ABNORMAL
IRON 24H UR-MRATE: 14 MCG/DL (ref 37–145)
IRON SATN MFR SERPL: 4 % (ref 20–50)
LDH SERPL-CCNC: 291 U/L (ref 135–214)
LYMPHOCYTES # BLD MANUAL: 1.19 10*3/MM3 (ref 0.7–3.1)
LYMPHOCYTES NFR BLD MANUAL: 4 % (ref 5–12)
MCH RBC QN AUTO: 20.2 PG (ref 26.6–33)
MCHC RBC AUTO-ENTMCNC: 29 G/DL (ref 31.5–35.7)
MCV RBC AUTO: 69.5 FL (ref 79–97)
MONOCYTES # BLD: 0.3 10*3/MM3 (ref 0.1–0.9)
NEUTROPHILS # BLD AUTO: 5.79 10*3/MM3 (ref 1.7–7)
NEUTROPHILS NFR BLD MANUAL: 78 % (ref 42.7–76)
NT-PROBNP SERPL-MCNC: 1020 PG/ML (ref 0–1800)
PLAT MORPH BLD: NORMAL
PLATELET # BLD AUTO: 356 10*3/MM3 (ref 140–450)
PMV BLD AUTO: 9.5 FL (ref 6–12)
POIKILOCYTOSIS BLD QL SMEAR: ABNORMAL
POTASSIUM SERPL-SCNC: 4 MMOL/L (ref 3.5–5.2)
PROT SERPL-MCNC: 6.2 G/DL (ref 6–8.5)
QT INTERVAL: 297 MS
QT INTERVAL: 377 MS
QTC INTERVAL: 410 MS
QTC INTERVAL: 494 MS
RBC # BLD AUTO: 3.02 10*6/MM3 (ref 3.77–5.28)
RETICS # AUTO: 0.05 10*6/MM3 (ref 0.02–0.13)
RETICS/RBC NFR AUTO: 1.77 % (ref 0.7–1.9)
RH BLD: POSITIVE
SODIUM SERPL-SCNC: 136 MMOL/L (ref 136–145)
T&S EXPIRATION DATE: NORMAL
TIBC SERPL-MCNC: 390 MCG/DL (ref 298–536)
TRANSFERRIN SERPL-MCNC: 262 MG/DL (ref 200–360)
TROPONIN T % DELTA: -7 %
TROPONIN T NUMERIC DELTA: -1 NG/L
TROPONIN T SERPL HS-MCNC: 14 NG/L
TSH SERPL DL<=0.05 MIU/L-ACNC: 3.28 UIU/ML (ref 0.27–4.2)
VARIANT LYMPHS NFR BLD MANUAL: 16 % (ref 19.6–45.3)
VIT B12 BLD-MCNC: 1302 PG/ML (ref 211–946)
WBC MORPH BLD: NORMAL
WBC NRBC COR # BLD AUTO: 7.42 10*3/MM3 (ref 3.4–10.8)
WHOLE BLOOD HOLD COAG: NORMAL

## 2024-12-20 PROCEDURE — 25810000003 SODIUM CHLORIDE 0.9 % SOLUTION: Performed by: HOSPITALIST

## 2024-12-20 PROCEDURE — 71045 X-RAY EXAM CHEST 1 VIEW: CPT

## 2024-12-20 PROCEDURE — 80053 COMPREHEN METABOLIC PANEL: CPT | Performed by: EMERGENCY MEDICINE

## 2024-12-20 PROCEDURE — 36430 TRANSFUSION BLD/BLD COMPNT: CPT

## 2024-12-20 PROCEDURE — 83036 HEMOGLOBIN GLYCOSYLATED A1C: CPT | Performed by: HOSPITALIST

## 2024-12-20 PROCEDURE — 93010 ELECTROCARDIOGRAM REPORT: CPT | Performed by: INTERNAL MEDICINE

## 2024-12-20 PROCEDURE — 83010 ASSAY OF HAPTOGLOBIN QUANT: CPT | Performed by: EMERGENCY MEDICINE

## 2024-12-20 PROCEDURE — 82746 ASSAY OF FOLIC ACID SERUM: CPT | Performed by: EMERGENCY MEDICINE

## 2024-12-20 PROCEDURE — 86880 COOMBS TEST DIRECT: CPT | Performed by: EMERGENCY MEDICINE

## 2024-12-20 PROCEDURE — 86900 BLOOD TYPING SEROLOGIC ABO: CPT

## 2024-12-20 PROCEDURE — 36415 COLL VENOUS BLD VENIPUNCTURE: CPT | Performed by: EMERGENCY MEDICINE

## 2024-12-20 PROCEDURE — 86923 COMPATIBILITY TEST ELECTRIC: CPT

## 2024-12-20 PROCEDURE — 25010000002 FUROSEMIDE PER 20 MG: Performed by: EMERGENCY MEDICINE

## 2024-12-20 PROCEDURE — 25010000002 FUROSEMIDE PER 20 MG: Performed by: HOSPITALIST

## 2024-12-20 PROCEDURE — 82607 VITAMIN B-12: CPT | Performed by: EMERGENCY MEDICINE

## 2024-12-20 PROCEDURE — 25010000002 NA FERRIC GLUC CPLX PER 12.5 MG: Performed by: HOSPITALIST

## 2024-12-20 PROCEDURE — 85045 AUTOMATED RETICULOCYTE COUNT: CPT | Performed by: EMERGENCY MEDICINE

## 2024-12-20 PROCEDURE — 85007 BL SMEAR W/DIFF WBC COUNT: CPT | Performed by: EMERGENCY MEDICINE

## 2024-12-20 PROCEDURE — 85025 COMPLETE CBC W/AUTO DIFF WBC: CPT | Performed by: EMERGENCY MEDICINE

## 2024-12-20 PROCEDURE — 83540 ASSAY OF IRON: CPT | Performed by: EMERGENCY MEDICINE

## 2024-12-20 PROCEDURE — 93005 ELECTROCARDIOGRAM TRACING: CPT | Performed by: HOSPITALIST

## 2024-12-20 PROCEDURE — 84466 ASSAY OF TRANSFERRIN: CPT | Performed by: EMERGENCY MEDICINE

## 2024-12-20 PROCEDURE — 83880 ASSAY OF NATRIURETIC PEPTIDE: CPT | Performed by: EMERGENCY MEDICINE

## 2024-12-20 PROCEDURE — 83615 LACTATE (LD) (LDH) ENZYME: CPT | Performed by: EMERGENCY MEDICINE

## 2024-12-20 PROCEDURE — 93005 ELECTROCARDIOGRAM TRACING: CPT | Performed by: EMERGENCY MEDICINE

## 2024-12-20 PROCEDURE — P9016 RBC LEUKOCYTES REDUCED: HCPCS

## 2024-12-20 PROCEDURE — 86900 BLOOD TYPING SEROLOGIC ABO: CPT | Performed by: EMERGENCY MEDICINE

## 2024-12-20 PROCEDURE — 99222 1ST HOSP IP/OBS MODERATE 55: CPT | Performed by: INTERNAL MEDICINE

## 2024-12-20 PROCEDURE — 86901 BLOOD TYPING SEROLOGIC RH(D): CPT | Performed by: EMERGENCY MEDICINE

## 2024-12-20 PROCEDURE — 84484 ASSAY OF TROPONIN QUANT: CPT | Performed by: EMERGENCY MEDICINE

## 2024-12-20 PROCEDURE — 99222 1ST HOSP IP/OBS MODERATE 55: CPT | Performed by: STUDENT IN AN ORGANIZED HEALTH CARE EDUCATION/TRAINING PROGRAM

## 2024-12-20 PROCEDURE — 99291 CRITICAL CARE FIRST HOUR: CPT

## 2024-12-20 PROCEDURE — 86850 RBC ANTIBODY SCREEN: CPT | Performed by: EMERGENCY MEDICINE

## 2024-12-20 PROCEDURE — 84443 ASSAY THYROID STIM HORMONE: CPT | Performed by: HOSPITALIST

## 2024-12-20 RX ORDER — ACETAMINOPHEN 325 MG/1
650 TABLET ORAL EVERY 4 HOURS PRN
Status: DISCONTINUED | OUTPATIENT
Start: 2024-12-20 | End: 2024-12-23 | Stop reason: HOSPADM

## 2024-12-20 RX ORDER — FUROSEMIDE 10 MG/ML
20 INJECTION INTRAMUSCULAR; INTRAVENOUS ONCE
Status: COMPLETED | OUTPATIENT
Start: 2024-12-20 | End: 2024-12-20

## 2024-12-20 RX ORDER — ONDANSETRON 4 MG/1
4 TABLET, ORALLY DISINTEGRATING ORAL EVERY 6 HOURS PRN
Status: DISCONTINUED | OUTPATIENT
Start: 2024-12-20 | End: 2024-12-23 | Stop reason: HOSPADM

## 2024-12-20 RX ORDER — PANTOPRAZOLE SODIUM 40 MG/1
40 TABLET, DELAYED RELEASE ORAL
Status: DISCONTINUED | OUTPATIENT
Start: 2024-12-20 | End: 2024-12-23 | Stop reason: HOSPADM

## 2024-12-20 RX ORDER — SODIUM CHLORIDE 0.9 % (FLUSH) 0.9 %
10 SYRINGE (ML) INJECTION AS NEEDED
Status: DISCONTINUED | OUTPATIENT
Start: 2024-12-20 | End: 2024-12-23 | Stop reason: HOSPADM

## 2024-12-20 RX ORDER — AMOXICILLIN 250 MG
2 CAPSULE ORAL 2 TIMES DAILY PRN
Status: DISCONTINUED | OUTPATIENT
Start: 2024-12-20 | End: 2024-12-23 | Stop reason: HOSPADM

## 2024-12-20 RX ORDER — SODIUM CHLORIDE 9 MG/ML
40 INJECTION, SOLUTION INTRAVENOUS AS NEEDED
Status: DISCONTINUED | OUTPATIENT
Start: 2024-12-20 | End: 2024-12-23 | Stop reason: HOSPADM

## 2024-12-20 RX ORDER — ONDANSETRON 2 MG/ML
4 INJECTION INTRAMUSCULAR; INTRAVENOUS EVERY 6 HOURS PRN
Status: DISCONTINUED | OUTPATIENT
Start: 2024-12-20 | End: 2024-12-23 | Stop reason: HOSPADM

## 2024-12-20 RX ORDER — NITROGLYCERIN 0.4 MG/1
0.4 TABLET SUBLINGUAL
Status: DISCONTINUED | OUTPATIENT
Start: 2024-12-20 | End: 2024-12-23 | Stop reason: HOSPADM

## 2024-12-20 RX ORDER — VERAPAMIL HYDROCHLORIDE 180 MG/1
180 TABLET, EXTENDED RELEASE ORAL DAILY
Status: DISCONTINUED | OUTPATIENT
Start: 2024-12-20 | End: 2024-12-23 | Stop reason: HOSPADM

## 2024-12-20 RX ORDER — POLYETHYLENE GLYCOL 3350 17 G/17G
17 POWDER, FOR SOLUTION ORAL DAILY PRN
Status: DISCONTINUED | OUTPATIENT
Start: 2024-12-20 | End: 2024-12-23 | Stop reason: HOSPADM

## 2024-12-20 RX ORDER — LISINOPRIL 20 MG/1
40 TABLET ORAL DAILY
Status: DISCONTINUED | OUTPATIENT
Start: 2024-12-20 | End: 2024-12-23 | Stop reason: HOSPADM

## 2024-12-20 RX ORDER — FUROSEMIDE 10 MG/ML
40 INJECTION INTRAMUSCULAR; INTRAVENOUS ONCE
Status: COMPLETED | OUTPATIENT
Start: 2024-12-20 | End: 2024-12-20

## 2024-12-20 RX ORDER — ASPIRIN 81 MG/1
81 TABLET ORAL DAILY
Status: DISCONTINUED | OUTPATIENT
Start: 2024-12-21 | End: 2024-12-23 | Stop reason: HOSPADM

## 2024-12-20 RX ORDER — BISACODYL 5 MG/1
5 TABLET, DELAYED RELEASE ORAL DAILY PRN
Status: DISCONTINUED | OUTPATIENT
Start: 2024-12-20 | End: 2024-12-23 | Stop reason: HOSPADM

## 2024-12-20 RX ORDER — SODIUM CHLORIDE 0.9 % (FLUSH) 0.9 %
10 SYRINGE (ML) INJECTION EVERY 12 HOURS SCHEDULED
Status: DISCONTINUED | OUTPATIENT
Start: 2024-12-20 | End: 2024-12-23 | Stop reason: HOSPADM

## 2024-12-20 RX ORDER — ACETAMINOPHEN 160 MG/5ML
650 SOLUTION ORAL EVERY 4 HOURS PRN
Status: DISCONTINUED | OUTPATIENT
Start: 2024-12-20 | End: 2024-12-23 | Stop reason: HOSPADM

## 2024-12-20 RX ORDER — ACETAMINOPHEN 650 MG/1
650 SUPPOSITORY RECTAL EVERY 4 HOURS PRN
Status: DISCONTINUED | OUTPATIENT
Start: 2024-12-20 | End: 2024-12-23 | Stop reason: HOSPADM

## 2024-12-20 RX ORDER — BISACODYL 10 MG
10 SUPPOSITORY, RECTAL RECTAL DAILY PRN
Status: DISCONTINUED | OUTPATIENT
Start: 2024-12-20 | End: 2024-12-23 | Stop reason: HOSPADM

## 2024-12-20 RX ADMIN — FUROSEMIDE 40 MG: 10 INJECTION, SOLUTION INTRAMUSCULAR; INTRAVENOUS at 14:04

## 2024-12-20 RX ADMIN — LISINOPRIL 40 MG: 20 TABLET ORAL at 17:23

## 2024-12-20 RX ADMIN — Medication 10 ML: at 21:46

## 2024-12-20 RX ADMIN — VERAPAMIL HYDROCHLORIDE 180 MG: 180 TABLET, FILM COATED, EXTENDED RELEASE ORAL at 21:45

## 2024-12-20 RX ADMIN — SODIUM CHLORIDE 250 MG: 9 INJECTION, SOLUTION INTRAVENOUS at 23:41

## 2024-12-20 RX ADMIN — PANTOPRAZOLE SODIUM 40 MG: 40 TABLET, DELAYED RELEASE ORAL at 17:23

## 2024-12-20 RX ADMIN — FUROSEMIDE 20 MG: 10 INJECTION, SOLUTION INTRAMUSCULAR; INTRAVENOUS at 17:23

## 2024-12-20 NOTE — CONSULTS
[facility]  Initial Inpatient Consult Note    Referring Provider: Anselmo Sandoval MD    Reason for Consultation: Severe iron deficiency anemia    Subjective     History of present illness:    Patient is a 79-year-old female lives at home and is completely independent in all the activities of daily living.  For the past several weeks she has been feeling increasingly tired and recently became short of breath on minimal exertion.  She also felt some chest pain and felt that she may be having a heart attack.  She is postmenopausal and denies any overt GI bleed in the form of hematemesis melena hematochezia.  Upon admission she has been found to have severe iron deficiency anemia with compatible Red cell indices and iron indices.  She denies taking frequent OTC NSAIDs    She does have a recent history of PTCA and stenting in December 23 as well as history of successful TAVR.  Home medications to include Plavix and patient took last dose....    Past Medical History:  Past Medical History:   Diagnosis Date    Angina pectoris     Aortic valve stenosis     Dyspnea on exertion     Heart valve disease     History of breast cancer     LEFT    HLD (hyperlipidemia) 12/22/2023    Hypertension 12/22/2023     Past Surgical History:  Past Surgical History:   Procedure Laterality Date    AORTIC VALVE REPAIR/REPLACEMENT N/A 2/6/2024    Procedure: TTE TRANSFEMORAL TRANSCATHETER AORTIC VALVE REPLACEMENT PERCUTANEOUS APPROACH;  Surgeon: Franki Deluca MD;  Location: [facility];  Service: Cardiothoracic;  Laterality: N/A;    AORTIC VALVE REPAIR/REPLACEMENT N/A 2/6/2024    Procedure: Transfemoral Transcatheter Aortic Valve Replacement with intraoperative TTE and possible open surgical rescue;  Surgeon: Vinod Gerber MD;  Location: [facility];  Service: Cardiovascular;  Laterality: N/A;    BREAST LUMPECTOMY Left     CARDIAC CATHETERIZATION N/A 12/14/2023    Procedure: Left Heart Cath;  Surgeon: Buzz  MD Vinod;  Location:  [facility] CATH INVASIVE LOCATION;  Service: Cardiovascular;  Laterality: N/A;    CARDIAC CATHETERIZATION N/A 12/14/2023    Procedure: Percutaneous Coronary Intervention;  Surgeon: Vinod Gerber MD;  Location:  [facility] CATH INVASIVE LOCATION;  Service: Cardiovascular;  Laterality: N/A;    CARDIAC CATHETERIZATION N/A 12/14/2023    Procedure: Stent AGUEDA coronary;  Surgeon: Vinod Gerber MD;  Location:  [facility] CATH INVASIVE LOCATION;  Service: Cardiovascular;  Laterality: N/A;    CARDIAC CATHETERIZATION N/A 12/14/2023    Procedure: Coronary angiography;  Surgeon: Vinod Gerber MD;  Location:  [facility] CATH INVASIVE LOCATION;  Service: Cardiovascular;  Laterality: N/A;    ORIF ANKLE FRACTURE Right       Social History:   Social History     Tobacco Use    Smoking status: Former     Current packs/day: 1.00     Average packs/day: 1 pack/day for 3.0 years (3.0 ttl pk-yrs)     Types: Cigarettes    Smokeless tobacco: Never    Tobacco comments:     QUIT 50 YEARS AGO   Substance Use Topics    Alcohol use: Not Currently     Comment: 'seldom'      Family History:  Family History   Problem Relation Age of Onset    Malig Hyperthermia Neg Hx        Home Meds:  Medications Prior to Admission   Medication Sig Dispense Refill Last Dose/Taking    atorvastatin (LIPITOR) 40 MG tablet Take 1 tablet by mouth Every Night. 90 tablet 0 12/19/2024    hydroCHLOROthiazide 25 MG tablet Take 1 tablet by mouth Daily As Needed (swelling). 90 tablet 3 12/19/2024    lisinopril (PRINIVIL,ZESTRIL) 40 MG tablet Take 1 tablet by mouth Daily. 90 tablet 3 12/19/2024    nitroglycerin (NITROSTAT) 0.4 MG SL tablet Place 1 tablet under the tongue Every 5 (Five) Minutes As Needed for Chest Pain (Systolic BP Greater Than 100). Take no more than 3 doses in 15 minutes. 50 tablet 12 12/20/2024    verapamil SR (CALAN-SR) 180 MG CR tablet Take 1 tablet by mouth Daily. 90 tablet 3 12/19/2024    clopidogrel (PLAVIX) 75 MG tablet Take 1 tablet by  "mouth Daily. 90 tablet 3     nitrofurantoin, macrocrystal-monohydrate, (Macrobid) 100 MG capsule Take 1 capsule by mouth 2 (Two) Times a Day. 14 capsule 0      Current Meds:   [START ON 12/21/2024] aspirin, 81 mg, Oral, Daily  ferric gluconate, 250 mg, Intravenous, Daily  lisinopril, 40 mg, Oral, Daily  pantoprazole, 40 mg, Oral, BID AC  sodium chloride, 10 mL, Intravenous, Q12H  verapamil SR, 180 mg, Oral, Daily      Allergies:  Allergies   Allergen Reactions    Wound Dressing Adhesive Rash     Review of Systems  Pertinent items are noted in HPI     Objective     Vital Signs  Temp:  [97.8 °F (36.6 °C)-99.2 °F (37.3 °C)] 97.8 °F (36.6 °C)  Heart Rate:  [] 120  Resp:  [18-22] 22  BP: (122-168)/(46-95) 168/58  Physical Exam:  General Appearance:    Alert, cooperative, in no acute distress   Head:    Normocephalic, without obvious abnormality, atraumatic   Eyes:          conjunctivae and sclerae normal, no   icterus   Throat:   no thrush, oral mucosa moist   Neck:   Supple, no adenopathy   Lungs:     Clear to auscultation bilaterally    Heart:    Regular rhythm and normal rate    Chest Wall:    No abnormalities observed   Abdomen:     Soft, nondistended, nontender; normal bowel sounds   Extremities:   no edema, no redness   Skin:   No bruising or rash   Psychiatric:  normal mood and insight     Results Review:   I reviewed the patient's new clinical results.    Results from last 7 days   Lab Units 12/20/24  1313   WBC 10*3/mm3 7.42   HEMOGLOBIN g/dL 6.1*   HEMATOCRIT % 21.0*   PLATELETS 10*3/mm3 356     Results from last 7 days   Lab Units 12/20/24  1313   SODIUM mmol/L 136   POTASSIUM mmol/L 4.0   CHLORIDE mmol/L 106   CO2 mmol/L 21.0*   BUN mg/dL 17   CREATININE mg/dL 0.91   CALCIUM mg/dL 8.6   BILIRUBIN mg/dL 0.4   ALK PHOS U/L 74   ALT (SGPT) U/L 10   AST (SGOT) U/L 17   GLUCOSE mg/dL 102*         No results found for: \"LIPASE\"    Radiology:  XR Chest 1 View   Final Result   No focal pulmonary consolidation. " Borderline heart size.   Tortuous aorta. Follow-up as clinically indicated.       This report was finalized on 12/20/2024 12:37 PM by Dr. Eliel Cifuentes M.D on Workstation: [id number]              Assessment & Plan   Active Hospital Problems    Diagnosis     **Symptomatic anemia        Assessment:  Patient with moderately severe iron deficiency anemia with compatible Red cell and iron indices.  The differential diagnosis includes colorectal neoplasia as well as gastroduodenal ulceration.     Plan:  Patient does require pan endoscopy.  She is currently eating a substantial dinner.  Will consider colonic prep tomorrow or day after and upper GI endoscopy and colonoscopy a day later.  Will also have to ask her as to when she took her last dose of Plavix.  Also consider intravenous iron infusion (will leave it to Dr. Sandoval to decide), as well as blood transfusions  According the patient she had her last dose of Plavix on approximately a month ago.  Therefore we will proceed for upper GI endoscopy and colonoscopy on Sunday day after tomorrow.      I discussed the patients findings and my recommendations with patient and nursing staff.          Mat Corbett M.D.  [facility]  [phone]

## 2024-12-20 NOTE — ED NOTES
".Nursing report ED to floor  Arlene Farrell  79 y.o.  female    HPI :  HPI  Stated Reason for Visit: CP/edema    Chief Complaint  Chief Complaint   Patient presents with    Chest Pain    Shortness of Breath    Edema       Admitting doctor:   Anselmo Sandoval MD    Admitting diagnosis:   The primary encounter diagnosis was Symptomatic anemia. Diagnoses of S/P TAVR (transcatheter aortic valve replacement), Hypervolemia, unspecified hypervolemia type, and Status post insertion of drug eluting coronary artery stent were also pertinent to this visit.    Code status:   Current Code Status       Date Active Code Status Order ID Comments User Context       Prior            Allergies:   Wound dressing adhesive    Isolation:   No active isolations    Intake and Output  No intake or output data in the 24 hours ending 12/20/24 1435    Weight:       12/20/24  1308   Weight: 103 kg (226 lb 12.8 oz)       Most recent vitals:   Vitals:    12/20/24 1209 12/20/24 1308 12/20/24 1408   BP: 162/76 122/95    Pulse: 118 (!) 138 105   Resp: 18 22 20   Temp: 99.2 °F (37.3 °C)     SpO2: 99% 98% 97%   Weight:  103 kg (226 lb 12.8 oz)    Height:  163.8 cm (64.5\")        Active LDAs/IV Access:   Lines, Drains & Airways       Active LDAs       Name Placement date Placement time Site Days    Peripheral IV 12/20/24 1312 Anterior;Right Forearm 12/20/24  1312  Forearm  less than 1                    Labs (abnormal labs have a star):   Labs Reviewed   COMPREHENSIVE METABOLIC PANEL - Abnormal; Notable for the following components:       Result Value    Glucose 102 (*)     CO2 21.0 (*)     Albumin 3.3 (*)     All other components within normal limits    Narrative:     GFR Categories in Chronic Kidney Disease (CKD)      GFR Category          GFR (mL/min/1.73)    Interpretation  G1                     90 or greater         Normal or high (1)  G2                      60-89                Mild decrease (1)  G3a                   45-59                " Mild to moderate decrease  G3b                   30-44                Moderate to severe decrease  G4                    15-29                Severe decrease  G5                    14 or less           Kidney failure          (1)In the absence of evidence of kidney disease, neither GFR category G1 or G2 fulfill the criteria for CKD.    eGFR calculation 2021 CKD-EPI creatinine equation, which does not include race as a factor   TROPONIN - Abnormal; Notable for the following components:    HS Troponin T 14 (*)     All other components within normal limits    Narrative:     High Sensitive Troponin T Reference Range:  <14.0 ng/L- Negative Female for AMI  <22.0 ng/L- Negative Male for AMI  >=14 - Abnormal Female indicating possible myocardial injury.  >=22 - Abnormal Male indicating possible myocardial injury.   Clinicians would have to utilize clinical acumen, EKG, Troponin, and serial changes to determine if it is an Acute Myocardial Infarction or myocardial injury due to an underlying chronic condition.        CBC WITH AUTO DIFFERENTIAL - Abnormal; Notable for the following components:    RBC 3.02 (*)     Hemoglobin 6.1 (*)     Hematocrit 21.0 (*)     MCV 69.5 (*)     MCH 20.2 (*)     MCHC 29.0 (*)     All other components within normal limits   MANUAL DIFFERENTIAL - Abnormal; Notable for the following components:    Neutrophil % 78.0 (*)     Lymphocyte % 16.0 (*)     Monocyte % 4.0 (*)     All other components within normal limits   BNP (IN-HOUSE) - Normal    Narrative:     This assay is used as an aid in the diagnosis of individuals suspected of having heart failure. It can be used as an aid in the diagnosis of acute decompensated heart failure (ADHF) in patients presenting with signs and symptoms of ADHF to the emergency department (ED). In addition, NT-proBNP of <300 pg/mL indicates ADHF is not likely.    Age Range Result Interpretation  NT-proBNP Concentration (pg/mL:      <50             Positive             >450                   Gray                 300-450                    Negative             <300    50-75           Positive            >900                  Gray                300-900                  Negative            <300      >75             Positive            >1800                  Gray                300-1800                  Negative            <300   RETICULOCYTES - Normal   HIGH SENSITIVITIY TROPONIN T 1HR    Narrative:     High Sensitive Troponin T Reference Range:  <14.0 ng/L- Negative Female for AMI  <22.0 ng/L- Negative Male for AMI  >=14 - Abnormal Female indicating possible myocardial injury.  >=22 - Abnormal Male indicating possible myocardial injury.   Clinicians would have to utilize clinical acumen, EKG, Troponin, and serial changes to determine if it is an Acute Myocardial Infarction or myocardial injury due to an underlying chronic condition.        VITAMIN B12   FOLATE   HAPTOGLOBIN   IRON PROFILE   LACTATE DEHYDROGENASE   DIRECT ANTIGLOBULIN TEST (DIRECT MEDARDO)   TYPE AND SCREEN   CBC AND DIFFERENTIAL    Narrative:     The following orders were created for panel order CBC & Differential.  Procedure                               Abnormality         Status                     ---------                               -----------         ------                     CBC Auto Differential[[id number]]        Abnormal            Final result                 Please view results for these tests on the individual orders.   EXTRA TUBES    Narrative:     The following orders were created for panel order Extra Tubes.  Procedure                               Abnormality         Status                     ---------                               -----------         ------                     Gold Top - SST[[id number]]                                   Final result               Light Blue Top[[id number]]                                   Final result                 Please view results for these tests on the  individual orders.   GOLD TOP - SST   LIGHT BLUE TOP       EKG:   ECG 12 Lead Chest Pain   Preliminary Result   HEART RBJI=857  bpm   RR Mygqhqlv=289  ms   SC Iuxpenym=286  ms   P Horizontal Axis=-88  deg   P Front Axis=-18  deg   QRSD Interval=99  ms   QT Ilhktxdw=513  ms   PNmF=019  ms   QRS Axis=-5  deg   T Wave Axis=113  deg   - ABNORMAL ECG -   Sinus tachycardia with irregular rate   Low voltage, precordial leads   Abnormal T, consider ischemia, lateral leads   Date and Time of Study:2024-12-20 12:20:43          Meds given in ED:   Medications   sodium chloride 0.9 % flush 10 mL (has no administration in time range)   furosemide (LASIX) injection 40 mg (40 mg Intravenous Given 12/20/24 1404)       Imaging results:  XR Chest 1 View    Result Date: 12/20/2024  No focal pulmonary consolidation. Borderline heart size. Tortuous aorta. Follow-up as clinically indicated.  This report was finalized on 12/20/2024 12:37 PM by Dr. Eliel Cifuentes M.D on Workstation: [employer]       Ambulatory status:   - up ad roxana    Social issues:   Social History     Socioeconomic History    Marital status:    Tobacco Use    Smoking status: Former     Current packs/day: 1.00     Average packs/day: 1 pack/day for 3.0 years (3.0 ttl pk-yrs)     Types: Cigarettes    Smokeless tobacco: Never    Tobacco comments:     QUIT 50 YEARS AGO   Vaping Use    Vaping status: Never Used   Substance and Sexual Activity    Alcohol use: Not Currently     Comment: 'seldom'    Drug use: Not Currently    Sexual activity: Defer     Birth control/protection: Post-menopausal       Peripheral Neurovascular  Peripheral Neurovascular (Adult)  Peripheral Neurovascular WDL: WDL  Additional Documentation: Edema (Group)  Edema  Edema: ankle, left, ankle, right, foot, left, foot, right  Ankle, Left Edema: 3+ (Moderate)  Ankle, Right Edema: 3+ (Moderate)  Foot, Left Edema: 3+ (Moderate)  Foot, Right Edema: 3+ (Moderate)    Neuro Cognitive  Neuro Cognitive  (Adult)  Cognitive/Neuro/Behavioral WDL: WDL    Learning  Learning Assessment  Learning Readiness and Ability: no barriers identified  Education Provided  Person Taught: patient  Teaching Method: verbal instruction    Respiratory  Respiratory  Airway WDL: WDL  Respiratory WDL  Respiratory WDL: .WDL except, rhythm/pattern  Rhythm/Pattern, Respiratory: shallow, shortness of breath    Abdominal Pain       Pain Assessments  Pain (Adult)  (0-10) Pain Rating: Rest: 0    NIH Stroke Scale       Mikey Earl RN  12/20/24 14:35 EST

## 2024-12-20 NOTE — ED PROVIDER NOTES
EMERGENCY DEPARTMENT ENCOUNTER  Room Number:  23/23  PCP: Provider, No Known  Independent Historians: Patient, EMS      HPI:  Chief Complaint: had concerns including Chest Pain, Shortness of Breath, and Edema.     A complete HPI/ROS/PMH/PSH/SH/FH are unobtainable due to: Nothing      Context: Arlene Farrell is a 79 y.o. female with a medical history of CAD status post PCI, aortic stenosis status post TAVR who presents to the ED c/o acute leg swelling, shortness of breath.  She had some associated chest discomfort this morning that she describes as mild pressure.  She took a single nitroglycerin and it immediately resolved.  She has not had to take nitroglycerin since her heart stent was placed last December.  She states she has also been lightheaded upon standing up.  She does have leg swelling that she believes is little worse lately but has been an ongoing problem for her.  She denies fever or chills.  No cough.  Currently she is pain-free.      Review of prior external notes (non-ED) -and- Review of prior external test results outside of this encounter: See ED course below for prior echo and cath record review        PAST MEDICAL HISTORY  Active Ambulatory Problems     Diagnosis Date Noted    Nonrheumatic aortic valve stenosis 12/07/2023    EATON (dyspnea on exertion) 12/14/2023    Coronary artery disease involving native coronary artery of native heart with angina pectoris 12/14/2023    Hypertension 12/22/2023    HLD (hyperlipidemia) 12/22/2023    S/P TAVR (transcatheter aortic valve replacement) 04/24/2024     Resolved Ambulatory Problems     Diagnosis Date Noted    Aortic stenosis 12/14/2023    Aortic stenosis, severe 02/06/2024     Past Medical History:   Diagnosis Date    Angina pectoris     Aortic valve stenosis     Dyspnea on exertion     Heart valve disease     History of breast cancer          PAST SURGICAL HISTORY  Past Surgical History:   Procedure Laterality Date    AORTIC VALVE REPAIR/REPLACEMENT N/A  2/6/2024    Procedure: TTE TRANSFEMORAL TRANSCATHETER AORTIC VALVE REPLACEMENT PERCUTANEOUS APPROACH;  Surgeon: Franki Deluca MD;  Location: [facility];  Service: Cardiothoracic;  Laterality: N/A;    AORTIC VALVE REPAIR/REPLACEMENT N/A 2/6/2024    Procedure: Transfemoral Transcatheter Aortic Valve Replacement with intraoperative TTE and possible open surgical rescue;  Surgeon: Vinod Gerber MD;  Location: [facility];  Service: Cardiovascular;  Laterality: N/A;    BREAST LUMPECTOMY Left     CARDIAC CATHETERIZATION N/A 12/14/2023    Procedure: Left Heart Cath;  Surgeon: Vinod Gerber MD;  Location: [facility] CATH INVASIVE LOCATION;  Service: Cardiovascular;  Laterality: N/A;    CARDIAC CATHETERIZATION N/A 12/14/2023    Procedure: Percutaneous Coronary Intervention;  Surgeon: Vinod Gerber MD;  [facility];  Service: Cardiovascular;  Laterality: N/A;    CARDIAC CATHETERIZATION N/A 12/14/2023    Procedure: Stent AGUEDA coronary;  Surgeon: Vinod Gerber MD;  Location: [facility] CATH INVASIVE LOCATION;  Service: Cardiovascular;  Laterality: N/A;    CARDIAC CATHETERIZATION N/A 12/14/2023    Procedure: Coronary angiography;  Surgeon: Vinod Gerber MD;  Location: [facility] CATH INVASIVE LOCATION;  Service: Cardiovascular;  Laterality: N/A;    ORIF ANKLE FRACTURE Right          FAMILY HISTORY  Family History   Problem Relation Age of Onset    Malig Hyperthermia Neg Hx          SOCIAL HISTORY  Social History     Socioeconomic History    Marital status:    Tobacco Use    Smoking status: Former     Current packs/day: 1.00     Average packs/day: 1 pack/day for 3.0 years (3.0 ttl pk-yrs)     Types: Cigarettes    Smokeless tobacco: Never    Tobacco comments:     QUIT 50 YEARS AGO   Vaping Use    Vaping status: Never Used   Substance and Sexual Activity    Alcohol use: Not Currently     Comment: 'seldom'    Drug use: Not Currently    Sexual activity: Defer     Birth control/protection:  Post-menopausal         ALLERGIES  Wound dressing adhesive      REVIEW OF SYSTEMS  Review of all 14 systems is negative other than stated in the HPI above.      PHYSICAL EXAM    I have reviewed the triage vital signs and nursing notes.    ED Triage Vitals [12/20/24 1209]   Temp Heart Rate Resp BP SpO2   99.2 °F (37.3 °C) 118 18 162/76 99 %      Temp src Heart Rate Source Patient Position BP Location FiO2 (%)   -- -- -- -- --         GENERAL: awake and alert, no acute distress  HENT: Normocephalic, atraumatic  EYES: no scleral icterus  CV: regular rhythm, regular rate, no murmur, 2+ pitting edema bilateral lower extremities  RESPIRATORY: normal effort, no crackles or wheezing  ABDOMEN: soft, nontender throughout  MUSCULOSKELETAL: no deformity  NEURO: alert, moves all extremities, follows commands, cranial nerves II through XII intact, speech fluent clear  PSYCH: calm, cooperative  SKIN: Warm, dry          LAB RESULTS  Recent Results (from the past 24 hours)   ECG 12 Lead Chest Pain    Collection Time: 12/20/24 12:20 PM   Result Value Ref Range    QT Interval 297 ms    QTC Interval 410 ms   Comprehensive Metabolic Panel    Collection Time: 12/20/24  1:13 PM    Specimen: Blood   Result Value Ref Range    Glucose 102 (H) 65 - 99 mg/dL    BUN 17 8 - 23 mg/dL    Creatinine 0.91 0.57 - 1.00 mg/dL    Sodium 136 136 - 145 mmol/L    Potassium 4.0 3.5 - 5.2 mmol/L    Chloride 106 98 - 107 mmol/L    CO2 21.0 (L) 22.0 - 29.0 mmol/L    Calcium 8.6 8.6 - 10.5 mg/dL    Total Protein 6.2 6.0 - 8.5 g/dL    Albumin 3.3 (L) 3.5 - 5.2 g/dL    ALT (SGPT) 10 1 - 33 U/L    AST (SGOT) 17 1 - 32 U/L    Alkaline Phosphatase 74 39 - 117 U/L    Total Bilirubin 0.4 0.0 - 1.2 mg/dL    Globulin 2.9 gm/dL    A/G Ratio 1.1 g/dL    BUN/Creatinine Ratio 18.7 7.0 - 25.0    Anion Gap 9.0 5.0 - 15.0 mmol/L    eGFR 64.7 >60.0 mL/min/1.73   BNP    Collection Time: 12/20/24  1:13 PM    Specimen: Blood   Result Value Ref Range    proBNP 1,020.0 0.0 - 1,800.0  pg/mL   High Sensitivity Troponin T    Collection Time: 12/20/24  1:13 PM    Specimen: Blood   Result Value Ref Range    HS Troponin T 14 (H) <14 ng/L   CBC Auto Differential    Collection Time: 12/20/24  1:13 PM    Specimen: Blood   Result Value Ref Range    WBC 7.42 3.40 - 10.80 10*3/mm3    RBC 3.02 (L) 3.77 - 5.28 10*6/mm3    Hemoglobin 6.1 (C) 12.0 - 15.9 g/dL    Hematocrit 21.0 (L) 34.0 - 46.6 %    MCV 69.5 (L) 79.0 - 97.0 fL    MCH 20.2 (L) 26.6 - 33.0 pg    MCHC 29.0 (L) 31.5 - 35.7 g/dL    RDW 15.4 12.3 - 15.4 %    RDW-SD 38.3 37.0 - 54.0 fl    MPV 9.5 6.0 - 12.0 fL    Platelets 356 140 - 450 10*3/mm3   Gold Top - SST    Collection Time: 12/20/24  1:13 PM   Result Value Ref Range    Extra Tube Hold for add-ons.    Light Blue Top    Collection Time: 12/20/24  1:13 PM   Result Value Ref Range    Extra Tube Hold for add-ons.    Manual Differential    Collection Time: 12/20/24  1:13 PM    Specimen: Blood   Result Value Ref Range    Neutrophil % 78.0 (H) 42.7 - 76.0 %    Lymphocyte % 16.0 (L) 19.6 - 45.3 %    Monocyte % 4.0 (L) 5.0 - 12.0 %    Eosinophil % 1.0 0.3 - 6.2 %    Basophil % 1.0 0.0 - 1.5 %    Neutrophils Absolute 5.79 1.70 - 7.00 10*3/mm3    Lymphocytes Absolute 1.19 0.70 - 3.10 10*3/mm3    Monocytes Absolute 0.30 0.10 - 0.90 10*3/mm3    Eosinophils Absolute 0.07 0.00 - 0.40 10*3/mm3    Basophils Absolute 0.07 0.00 - 0.20 10*3/mm3    Hypochromia Mod/2+ None Seen    Poikilocytes Mod/2+ None Seen    WBC Morphology Normal Normal    Platelet Morphology Normal Normal   Reticulocytes    Collection Time: 12/20/24  1:13 PM    Specimen: Blood   Result Value Ref Range    Reticulocyte % 1.77 0.70 - 1.90 %    Reticulocyte Absolute 0.0533 0.0200 - 0.1300 10*6/mm3       The above labs were ordered by me and independently reviewed by me.     RADIOLOGY  XR Chest 1 View    Result Date: 12/20/2024  XR CHEST 1 VW-  HISTORY: Female who is 79 years-old, chest pain  TECHNIQUE: Frontal view of the chest  COMPARISON:  3/27/2024  FINDINGS: The heart size is borderline. Cardiac valve marker is noted. Aorta is tortuous. Pulmonary vasculature is unremarkable. No focal pulmonary consolidation, pleural effusion, or pneumothorax. Large hiatal hernia is again apparent. No acute osseous process.      No focal pulmonary consolidation. Borderline heart size. Tortuous aorta. Follow-up as clinically indicated.  This report was finalized on 12/20/2024 12:37 PM by Dr. Eliel Cifuentes M.D on Workstation: [employer]       The above radiology studies were ordered by me.  See ED course for independent interpretations.     MEDICATIONS GIVEN IN ER  Medications   sodium chloride 0.9 % flush 10 mL (has no administration in time range)   furosemide (LASIX) injection 40 mg (40 mg Intravenous Given 12/20/24 1404)         ORDERS PLACED DURING THIS VISIT:  Orders Placed This Encounter   Procedures    XR Chest 1 View    Comprehensive Metabolic Panel    BNP    High Sensitivity Troponin T    CBC Auto Differential    Manual Differential    High Sensitivity Troponin T 1Hr    Vitamin B12    Folate    Haptoglobin    Iron Profile    Lactate Dehydrogenase    Reticulocytes    Continuous Pulse Oximetry    Monitor Blood Pressure    Verify Informed Consent for Blood Product Administration    LHA (on-call MD unless specified) Details    LCG (on-call MD unless specified)    ECG 12 Lead Chest Pain    Type & Screen    Direct Antiglobulin Test (Direct Ping)    Insert Peripheral IV    Inpatient Admission    CBC & Differential    Extra Tubes    Gold Top - SST    Light Blue Top         OUTPATIENT MEDICATION MANAGEMENT:  Current Facility-Administered Medications Ordered in Epic   Medication Dose Route Frequency Provider Last Rate Last Admin    sodium chloride 0.9 % flush 10 mL  10 mL Intravenous PRN Gamaliel Lazaro MD         Current Outpatient Medications Ordered in Epic   Medication Sig Dispense Refill    atorvastatin (LIPITOR) 40 MG tablet Take 1 tablet by mouth  Every Night. 90 tablet 0    clopidogrel (PLAVIX) 75 MG tablet Take 1 tablet by mouth Daily. 90 tablet 3    hydroCHLOROthiazide 25 MG tablet Take 1 tablet by mouth Daily As Needed (swelling). 90 tablet 3    lisinopril (PRINIVIL,ZESTRIL) 40 MG tablet Take 1 tablet by mouth Daily. 90 tablet 3    nitrofurantoin, macrocrystal-monohydrate, (Macrobid) 100 MG capsule Take 1 capsule by mouth 2 (Two) Times a Day. 14 capsule 0    nitroglycerin (NITROSTAT) 0.4 MG SL tablet Place 1 tablet under the tongue Every 5 (Five) Minutes As Needed for Chest Pain (Systolic BP Greater Than 100). Take no more than 3 doses in 15 minutes. 50 tablet 12    verapamil SR (CALAN-SR) 180 MG CR tablet Take 1 tablet by mouth Daily. 90 tablet 3         PROCEDURES  Procedures      Critical care provider statement:    Critical care time (minutes): 40.   Critical care time was exclusive of:  Separately billable procedures and treating other patients   Critical care was necessary to treat or prevent imminent or life-threatening deterioration of the following conditions:  Circulatory Failure   Critical care was time spent personally by me on the following activities:  Development of treatment plan with patient or surrogate, discussions with consultants, evaluation of patient's response to treatment, examination of patient, obtaining history from patient or surrogate, ordering and performing treatments and interventions, ordering and review of laboratory studies, ordering and review of radiographic studies, pulse oximetry, re-evaluation of patient's condition and review of old charts. Critical Care indicators: Blood loss, PRBCs hung, GI Bleed       PROGRESS, DATA ANALYSIS, CONSULTS, AND MEDICAL DECISION MAKING  All labs have been independently interpreted by me.  All radiology studies have been reviewed by me. All EKG's have been independently viewed and interpreted by me.  Discussion below represents my analysis of pertinent findings related to patient's  condition, differential diagnosis, treatment plan and final disposition.    Differential diagnosis includes but is not limited to:  Symptomatic anemia  Acute diastolic heart failure  Hemolytic anemia  GI bleed  High-output heart failure  Acute coronary syndrome      Clinical Scores:                  ED Course as of 12/20/24 1417   Fri Dec 20, 2024   1214 I reviewed prior Holter study from 5-24.  Patient had occasional PACs and PVCs, 2 short runs of nonsustained VT, rare atrial fibrillation.  Echo dated March 27, 2024 demonstrated EF 65%, TAVR valve present and functioning normally.  Cardiac cath reviewed from 2/23/2024.  Patient underwent TAVR procedure with Dr. Gerber.  I reviewed previous cardiac cath from 12/14/2023.  Patient had severe aortic stenosis and also severe disease in mid LAD status post PCI with AGUEDA. [JR]   1237 EKG          EKG time: 12:20 PM  Rhythm/Rate: Sinus tach with irregular rate, rate 114  P waves and FL: Normal  QRS, axis: Normal axis  ST and T waves: No acute ischemic changes    Interpreted Contemporaneously by me, independently viewed  Similar compared to prior to 724       [JR]   1345 Hemoglobin(!!): 6.1 [JR]   1345 WBC: 7.42 [JR]   1345 Platelets: 356 [JR]   1348 Patient is anemic with critical hemoglobin of 6.1.  She also appears volume overloaded.  Have ordered 40 mg IV Lasix as well as 1 unit of packed red blood cells for transfusion to keep her euvolemic.  She does not have evidence of pulmonary edema.  Initial cardiac troponin 14.  She denies recent black or bloody stool.  I sent anemia labs including evaluation for possible hemolysis given history of TAVR.  Will plan for medicine admission, cardiology consultation. [JR]   1405 Discussed with Dr. Sandoval, [facility] hospitalist, who agrees to admit to telemetry bed. [JR]   1406 I reviewed prior cardiology office note from April of this year.  She was noted to be anemic at that time with hemoglobin 8.8 and was advised to see her PCP about  her anemia.  She admits that she never did follow-up with her PCP about this.  She also missed several follow-up appointments with Dr. Gerber.  She denies any prior history of black or bloody stool. [JR]   1417 Discussed with Dr. Napier, cardiologist, who agrees to consult. [JR]      ED Course User Index  [JR] Gamaliel Lazaro MD             AS OF 14:17 EST VITALS:    BP - 122/95  HR - 105  TEMP - 99.2 °F (37.3 °C)  O2 SATS - 97%    COMPLEXITY OF CARE  The patient requires admission.      Chronic or social conditions impacting patient care (Social Determinants of Health):     DIAGNOSIS  Final diagnoses:   Symptomatic anemia   S/P TAVR (transcatheter aortic valve replacement)   Hypervolemia, unspecified hypervolemia type   Status post insertion of drug eluting coronary artery stent           DISPOSITION  Admit      Prescription drug monitoring program review:           Please note that portions of this document were completed with a voice recognition program.    Note Disclaimer: At [facility], we believe that sharing information builds trust and better relationships. You are receiving this note because you recently visited [facility]. It is possible you will see health information before a provider has talked with you about it. This kind of information can be easy to misunderstand. To help you fully understand what it means for your health, we urge you to discuss this note with your provider.         Gamaliel Lazaro MD  12/20/24 4648

## 2024-12-20 NOTE — H&P
HISTORY AND PHYSICAL   [facility]        Patient Identification:  Name: Arlene Farrell  Age: 79 y.o.  Sex: female  :  1945  MRN: [id number]                     Primary Care Physician: Provider, No Known    Chief Complaint: Short of air....    History of Present Illness:   Pleasant 79-year-old female with history of coronary disease having undergone angioplasty and stenting in 2023 and also history of aortic stenosis having undergone a successful TAVR (replacement) 2024.  She presents of significant worsening of dyspnea on exertion.  She has actually noted poor exercise tolerance since her aortic surgery back in February.  She has been noting an increase in her dyspnea on exertion and the last week or so was even developing orthopnea, being short of breath lying flat and having to sit up to catch her breath.  She notes feeling lightheaded when she stands up and then earlier today developed chest heaviness which she has not had since her angioplasty.  Responded to 1 nitroglycerin.  Workup in emergency room revealed a profound microcytic anemia.  Notable that she has been anemic as far back as epic goes which is 2023.  That has been getting progressively worse.  Iron panels are consistent with severe iron deficiency as well as component of anemia of chronic disease.  She has never had a colonoscopy.  She is postmenopausal and has not noted any unusual bowel habits.  No blood per rectum....   She states she was prescribed iron supplements after surgery this last winter that she could not tolerate them due to GI side effects.  She also notes lower extremity edema but at present states that this is actually chronic.    Past Medical History:  Past Medical History:   Diagnosis Date    Angina pectoris     Aortic valve stenosis     Dyspnea on exertion     Heart valve disease     History of breast cancer     LEFT    HLD (hyperlipidemia) 2023    Hypertension 2023      Past Surgical History:  Past Surgical History:   Procedure Laterality Date    AORTIC VALVE REPAIR/REPLACEMENT N/A 2/6/2024    Procedure: TTE TRANSFEMORAL TRANSCATHETER AORTIC VALVE REPLACEMENT PERCUTANEOUS APPROACH;  Surgeon: Franki Deluca MD;  Location: [facility];  Service: Cardiothoracic;  Laterality: N/A;    AORTIC VALVE REPAIR/REPLACEMENT N/A 2/6/2024    Procedure: Transfemoral Transcatheter Aortic Valve Replacement with intraoperative TTE and possible open surgical rescue;  Surgeon: Vinod Gerber MD;  Location: [facility];  Service: Cardiovascular;  Laterality: N/A;    BREAST LUMPECTOMY Left     CARDIAC CATHETERIZATION N/A 12/14/2023    Procedure: Left Heart Cath;  Surgeon: Vinod Gerber MD;  Location: [facility] CATH INVASIVE LOCATION;  Service: Cardiovascular;  Laterality: N/A;    CARDIAC CATHETERIZATION N/A 12/14/2023    Procedure: Percutaneous Coronary Intervention;  Surgeon: Vinod Gerber MD;  Location: [facility] CATH INVASIVE LOCATION;  Service: Cardiovascular;  Laterality: N/A;    CARDIAC CATHETERIZATION N/A 12/14/2023    Procedure: Stent AGUEDA coronary;  Surgeon: Vinod Gerber MD;  Location: [facility] CATH INVASIVE LOCATION;  Service: Cardiovascular;  Laterality: N/A;    CARDIAC CATHETERIZATION N/A 12/14/2023    Procedure: Coronary angiography;  Surgeon: Vinod Gerber MD;  Location: [facility] CATH INVASIVE LOCATION;  Service: Cardiovascular;  Laterality: N/A;    ORIF ANKLE FRACTURE Right       Home Meds:  Medications Prior to Admission   Medication Sig Dispense Refill Last Dose/Taking    atorvastatin (LIPITOR) 40 MG tablet Take 1 tablet by mouth Every Night. 90 tablet 0     clopidogrel (PLAVIX) 75 MG tablet Take 1 tablet by mouth Daily. 90 tablet 3     hydroCHLOROthiazide 25 MG tablet Take 1 tablet by mouth Daily As Needed (swelling). 90 tablet 3     lisinopril (PRINIVIL,ZESTRIL) 40 MG tablet Take 1 tablet by mouth Daily. 90 tablet 3     nitrofurantoin, macrocrystal-monohydrate, (Macrobid)  100 MG capsule Take 1 capsule by mouth 2 (Two) Times a Day. 14 capsule 0     nitroglycerin (NITROSTAT) 0.4 MG SL tablet Place 1 tablet under the tongue Every 5 (Five) Minutes As Needed for Chest Pain (Systolic BP Greater Than 100). Take no more than 3 doses in 15 minutes. 50 tablet 12     verapamil SR (CALAN-SR) 180 MG CR tablet Take 1 tablet by mouth Daily. 90 tablet 3        Allergies:  Allergies   Allergen Reactions    Wound Dressing Adhesive Rash     Immunizations:  Immunization History   Administered Date(s) Administered    COVID-19 (AG) 03/15/2021    Tdap 2018     Social History:   Social History     Social History Narrative    Not on file     Social History     Tobacco Use    Smoking status: Former     Current packs/day: 1.00     Average packs/day: 1 pack/day for 3.0 years (3.0 ttl pk-yrs)     Types: Cigarettes    Smokeless tobacco: Never    Tobacco comments:     QUIT 50 YEARS AGO   Substance Use Topics    Alcohol use: Not Currently     Comment: 'seldom'     Family History:  Family History   Problem Relation Age of Onset    Malig Hyperthermia Neg Hx         Review of Systems  Review of Systems   Constitutional:         As per history of present illness.   HENT: Negative.     Eyes: Negative.    Respiratory:          As per history of present illness.   Cardiovascular:         As per history of present illness.   Gastrointestinal: Negative.    Endocrine: Negative.    Genitourinary: Negative.    Musculoskeletal: Negative.    Skin: Negative.    Allergic/Immunologic: Negative.    Neurological: Negative.    Hematological:         As per history of present illness.   Psychiatric/Behavioral: Negative.         Objective:  T Max 24 hrs: Temp (24hrs), Av.2 °F (37.3 °C), Min:99.2 °F (37.3 °C), Max:99.2 °F (37.3 °C)    Vitals Ranges:   Temp:  [99.2 °F (37.3 °C)] 99.2 °F (37.3 °C)  Heart Rate:  [105-138] 105  Resp:  [18-22] 20  BP: (122-162)/(76-95) 122/95      Exam:  Physical Exam  Constitutional:        General: She is not in acute distress.     Appearance: Normal appearance. She is obese. She is not ill-appearing, toxic-appearing or diaphoretic.   HENT:      Head: Normocephalic and atraumatic.      Right Ear: External ear normal.      Left Ear: External ear normal.      Nose: Nose normal.      Mouth/Throat:      Mouth: Mucous membranes are moist.   Eyes:      General: No scleral icterus.        Right eye: No discharge.         Left eye: No discharge.      Extraocular Movements: Extraocular movements intact.      Conjunctiva/sclera: Conjunctivae normal.   Cardiovascular:      Rate and Rhythm: Tachycardia present. Rhythm irregular.      Heart sounds:      No friction rub. No gallop.      Comments: Heart rate just slightly over 100 and irregular.  EKG consistent with MAT.  Pulmonary:      Effort: Pulmonary effort is normal.      Breath sounds: Normal breath sounds.   Abdominal:      General: Abdomen is flat. Bowel sounds are normal. There is no distension.      Palpations: Abdomen is soft. There is no mass.      Tenderness: There is no abdominal tenderness. There is no guarding or rebound.   Musculoskeletal:      Cervical back: Neck supple.      Right lower leg: Edema present.      Left lower leg: Edema present.      Comments: 2+ mostly nonpitting pedal edema bilaterally.   Skin:     General: Skin is warm and dry.   Neurological:      General: No focal deficit present.      Mental Status: She is alert and oriented to person, place, and time.   Psychiatric:         Mood and Affect: Mood normal.         Behavior: Behavior normal.         Thought Content: Thought content normal.         Judgment: Judgment normal.         Data Review:  All labs and radiology reviewed.    Assessment:  Severe, symptomatic iron deficiency anemia: Likely prolonged slow GI blood loss.  Unit of packed RBCs ordered via ER.  This will be accompanied with IV Lasix.  Initiate ferric gluconate infusions, 250 mg IV daily x 3.  GI evaluation for  follow-up.  CHF: Secondary to above.  Echocardiogram in March revealed good LVEF and a well-functioning bioprosthetic aortic valve.  Continue diuresis and transfuse as needed.  Angina: Again likely secondary to above.  No recurrence.  Monitor.  Cardiology consult.  Edema: Possibly multifactorial including CHF.  Check thyroid panel.  Consider verapamil playing a role if thyroid panel normal and poor response to diuresis.  MAT: Cardiology consult.  Hypertension: Continue home meds.  Monitor.  Hyperglycemia: Monitor.  Check A1c.    Plan:  Please see above.  Discussed with patient.  Discussed with ER provider.  Discussed with RN.    Anselmo Sandoval MD  12/20/2024  16:17 EST    EMR Dragon/Transcription disclaimer:   Much of this encounter note is an electronic transcription/translation of spoken language to printed text. The electronic translation of spoken language may permit erroneous, or at times, nonsensical words or phrases to be inadvertently transcribed; Although I have reviewed the note for such errors, some may still exist.

## 2024-12-20 NOTE — ED NOTES
Pt to Er via EMS for dyspnea and edema in all extremities. Pt also c/o CP     EMS gave her nitro which relieved all CP

## 2024-12-20 NOTE — CONSULTS
[facility]        Patient Name: Arlene Farrell  Age/Sex: 79 y.o. female  : 1945  MRN: [id number]    Date of Admission: 2024  Date of Encounter Visit: 24  Encounter Provider: Varghese Napier MD  Referring Provider: Anselmo Sandoval MD  Place of Service: [facility] CARDIOLOGY  Patient Care Team:  Provider, No Known as PCP - General    Subjective:     Chief Complaint: Chest pain, shortness of breath, edema    Reason for consult: Chest pain, shortness of breath, edema     History of Present Illness:  Arlene Farrell is a 79 y.o. female who presents for further evaluation of dyspnea on exertion.  She was found to have symptomatic anemia    She follows with Dr Gerber. She has a history of severe aortic stenosis s/p TAVR (#26 Tate Jose) in 2024. Preoperative cardiac cath showed critical disease in her mid LAD. She ended up having angioplasty and AGUEDA placed to LAD. Following her TAVR she continued to complain of shortness of breath with exertion, but her fatigue improved. She also reported intermittent palpations. Echocardiogram was checked which looked good, proBNP and chest x-ray were also normal. She was started on metoprolol which was eventually discontinued (patient felt poorly on it). She was anemic and was referred back to her PCPs office.  It appears the anemia was not worked up.    She presented to the ER today complaining of shortness of breath with chest discomfort and LE swelling. She described her discomfort as a mild pressure. She took a sublingual nitroglycerin which completely resolved her pain. In the ER her heart rate is 118, blood pressure 162/76. She was tachycardic on her EKG with no ischemic changes. CMP largely unremarkable, normal proBNP, HS troponin 14, CBC revealed critical hemoglobin of 6.1. Physical exam demonstrated volume overload. She was given 40 mg IV lasix and 1 unit PRBC. She was admitted to [facility] for  further management.     She has report that she has had subacute worsening the symptoms over several months.  She actually quit taking the Plavix because she was feeling poorly about a month ago.  She has had no further chest pain since presentation.                Prior Cardiac Testing:  ECHO 3/27/2024    Left ventricular systolic function is normal. Calculated left ventricular EF = 65.2%    Left ventricular wall thickness is consistent with moderate concentric hypertrophy.    There is a TAVR valve present that is functioning normally.  No significant stenosis or regurgitation present.    Cardiac Catheterization 2/6/2024  CONCLUSIONS:  Successful deployment of a 26 mm Tate Jose transcatheter aortic heart valve.     Past Medical History:  Past Medical History:   Diagnosis Date    Angina pectoris     Aortic valve stenosis     Dyspnea on exertion     Heart valve disease     History of breast cancer     LEFT    HLD (hyperlipidemia) 12/22/2023    Hypertension 12/22/2023       Past Surgical History:   Procedure Laterality Date    AORTIC VALVE REPAIR/REPLACEMENT N/A 2/6/2024    Procedure: TTE TRANSFEMORAL TRANSCATHETER AORTIC VALVE REPLACEMENT PERCUTANEOUS APPROACH;  Surgeon: Franki Deluca MD;  Location: [facility];  Service: Cardiothoracic;  Laterality: N/A;    AORTIC VALVE REPAIR/REPLACEMENT N/A 2/6/2024    Procedure: Transfemoral Transcatheter Aortic Valve Replacement with intraoperative TTE and possible open surgical rescue;  Surgeon: Vinod Gerber MD;  Location: [facility];  Service: Cardiovascular;  Laterality: N/A;    BREAST LUMPECTOMY Left     CARDIAC CATHETERIZATION N/A 12/14/2023    Procedure: Left Heart Cath;  Surgeon: Vinod Gerber MD;  Location: [facility] INVASIVE LOCATION;  Service: Cardiovascular;  Laterality: N/A;    CARDIAC CATHETERIZATION N/A 12/14/2023    Procedure: Percutaneous Coronary Intervention;  Surgeon: Vinod Gerber MD;  Location: [facility] CATH INVASIVE LOCATION;  Service:  Cardiovascular;  Laterality: N/A;    CARDIAC CATHETERIZATION N/A 12/14/2023    Procedure: Stent AGUEDA coronary;  Surgeon: Vinod Gerber MD;  Location:  [facility] CATH INVASIVE LOCATION;  Service: Cardiovascular;  Laterality: N/A;    CARDIAC CATHETERIZATION N/A 12/14/2023    Procedure: Coronary angiography;  Surgeon: Vinod Gerber MD;  Location:  [facility] CATH INVASIVE LOCATION;  Service: Cardiovascular;  Laterality: N/A;    ORIF ANKLE FRACTURE Right        Home Medications:   Medications Prior to Admission   Medication Sig Dispense Refill Last Dose/Taking    atorvastatin (LIPITOR) 40 MG tablet Take 1 tablet by mouth Every Night. 90 tablet 0 12/19/2024    hydroCHLOROthiazide 25 MG tablet Take 1 tablet by mouth Daily As Needed (swelling). 90 tablet 3 12/19/2024    lisinopril (PRINIVIL,ZESTRIL) 40 MG tablet Take 1 tablet by mouth Daily. 90 tablet 3 12/19/2024    nitroglycerin (NITROSTAT) 0.4 MG SL tablet Place 1 tablet under the tongue Every 5 (Five) Minutes As Needed for Chest Pain (Systolic BP Greater Than 100). Take no more than 3 doses in 15 minutes. 50 tablet 12 12/20/2024    verapamil SR (CALAN-SR) 180 MG CR tablet Take 1 tablet by mouth Daily. 90 tablet 3 12/19/2024    clopidogrel (PLAVIX) 75 MG tablet Take 1 tablet by mouth Daily. 90 tablet 3     nitrofurantoin, macrocrystal-monohydrate, (Macrobid) 100 MG capsule Take 1 capsule by mouth 2 (Two) Times a Day. 14 capsule 0        Allergies:  Allergies   Allergen Reactions    Wound Dressing Adhesive Rash       Past Social History:  Social History     Socioeconomic History    Marital status:    Tobacco Use    Smoking status: Former     Current packs/day: 1.00     Average packs/day: 1 pack/day for 3.0 years (3.0 ttl pk-yrs)     Types: Cigarettes    Smokeless tobacco: Never    Tobacco comments:     QUIT 50 YEARS AGO   Vaping Use    Vaping status: Never Used   Substance and Sexual Activity    Alcohol use: Not Currently     Comment: 'seldom'    Drug use: Not  Currently    Sexual activity: Defer     Birth control/protection: Post-menopausal       Past Family History:  Family History   Problem Relation Age of Onset    Malig Hyperthermia Neg Hx        REVIEW OF SYSTEMS:   14 point ROS was performed and is negative except as outlined in HPI          Objective:   Temp:  [97.8 °F (36.6 °C)-99.4 °F (37.4 °C)] 98 °F (36.7 °C)  Heart Rate:  [] 95  Resp:  [18-22] 18  BP: (110-168)/(43-95) 154/87     Intake/Output Summary (Last 24 hours) at 12/21/2024 0817  Last data filed at 12/20/2024 2340  Gross per 24 hour   Intake 962.5 ml   Output --   Net 962.5 ml     Body mass index is 37.19 kg/m².      12/20/24  1308 12/20/24  1556 12/21/24  0449   Weight: 103 kg (226 lb 12.8 oz) 102 kg (224 lb 9.6 oz) 99.8 kg (220 lb)     Weight change:     General Appearance:    Alert, cooperative, in no acute distress   Throat:   oral mucosa moist   Neck:   supple, trachea midline, no thyromegaly, no carotid bruit, no JVD, although exam limited due to body habitus   Lungs:     Clear to auscultation,respirations regular, even and unlabored.    Heart:    Regular rhythm and normal rate, normal S1 and S2, no murmur, no gallop, no rub, no click   Chest Wall:    No abnormalities observed   Abdomen:     nondistended, no guarding, no rebound  tenderness   Extremities:   Moves all extremities well, 1+ lower extremity edema, no cyanosis, no redness   Pulses:   Pulses palpable and equal bilaterally. Normal radial, carotid, femoral, dorsalis pedis and posterior tibial pulses bilaterally.    Skin:  Psychiatric:   No bleeding, bruising or rash    Alert and oriented x 3, normal mood and affect     Lab Review:   Results from last 7 days   Lab Units 12/21/24  0549 12/20/24  1313   SODIUM mmol/L 139 136   POTASSIUM mmol/L 3.6 4.0   CHLORIDE mmol/L 104 106   CO2 mmol/L 24.4 21.0*   BUN mg/dL 18 17   CREATININE mg/dL 1.06* 0.91   GLUCOSE mg/dL 107* 102*   CALCIUM mg/dL 8.5* 8.6   AST (SGOT) U/L  --  17   ALT (SGPT)  "U/L  --  10     Results from last 7 days   Lab Units 12/20/24  1404 12/20/24  1313   HSTROP T ng/L 13 14*     Results from last 7 days   Lab Units 12/21/24  0549 12/20/24  1313   WBC 10*3/mm3 6.03 7.42   HEMOGLOBIN g/dL 7.9* 6.1*   HEMATOCRIT % 25.7* 21.0*   PLATELETS 10*3/mm3 277 356         Results from last 7 days   Lab Units 12/21/24  0549   MAGNESIUM mg/dL 2.2           Invalid input(s): \"LDLCALC\"  Results from last 7 days   Lab Units 12/20/24  1313   PROBNP pg/mL 1,020.0     Results from last 7 days   Lab Units 12/20/24  1616   TSH uIU/mL 3.280       Echo EF Estimated  No results found for: \"ECHOEFEST\"    EKG:   I personally viewed and interpreted the patient's EKG           Imaging:  Imaging Results (Most Recent)       Procedure Component Value Units Date/Time    XR Chest 1 View [[id number]] Collected: 12/20/24 1233     Updated: 12/20/24 1240    Narrative:      XR CHEST 1 VW-     HISTORY: Female who is 79 years-old, chest pain     TECHNIQUE: Frontal view of the chest     COMPARISON: 3/27/2024     FINDINGS: The heart size is borderline. Cardiac valve marker is noted.  Aorta is tortuous. Pulmonary vasculature is unremarkable. No focal  pulmonary consolidation, pleural effusion, or pneumothorax. Large hiatal  hernia is again apparent. No acute osseous process.       Impression:      No focal pulmonary consolidation. Borderline heart size.  Tortuous aorta. Follow-up as clinically indicated.     This report was finalized on 12/20/2024 12:37 PM by Dr. Eliel Cifuentes M.D on Workstation: [id number]                   Assessment:     Active Hospital Problems    Diagnosis  POA    **Symptomatic anemia [D64.9]  Yes      Resolved Hospital Problems   No resolved problems to display.          Plan:     Symptomatic anemia: Workup ongoing per medicine team.  In the event that she needs a GI workup for endoscopies, she may proceed with endoscopies with acceptable, low, risk.    I have ordered aspirin 81 to be started tomorrow, " can certainly withhold it if there is any significant life-threatening bleeding  Hemoglobin 6.  Would recommend transfusing to hemoglobin of 8 given her chest pain responding to nitro.  History of CAD status post PCI: Continue aspirin 81.  LAD PCI December 2023.  Okay to stay off Plavix for now.  Continue aspirin 81 indefinitely.  Severe aortic stenosis status post TAVR, 26 mm Tate LAKEISHA February 2024: Aspirin 81 per above.  Lower extremity edema, concerns for CHF.  Check echo  I suspect this may be high-output CHF related to anemia  Monitor after transfusions.  Her chest x-ray does not look that wet thankfully  Tachycardia.  Sinus tachycardia with APCs, versus MAT.  Likely physiologic response due to anemia.  She did not tolerate metoprolol in the past  She is on verapamil, lower extremity edema may be multifactorial due to this  We will see what her heart rate does with transfusions, if remains elevated we may need to increase calcium channel blocker  Chest pain.  Secondary to anemia.  Troponin trend does not suggest ACS.  No evidence of A-fib    Thank you for allowing me to participate in the care of Arlene Farrell. Feel free to contact me directly with any further questions or concerns.    Varghese Napier MD  [facility]  12/21/24  14:18 EST      Part of this note may be an electronic transcription/translation of spoken language to printed text using the Dragon Dictation System.

## 2024-12-20 NOTE — PLAN OF CARE
Goal Outcome Evaluation:      Pt admitted to floor from ER; admission completed; 1 unit of blood given this shift; on room air at this time;  medicated per order; plan of care on going.

## 2024-12-21 ENCOUNTER — APPOINTMENT (OUTPATIENT)
Dept: CARDIOLOGY | Facility: HOSPITAL | Age: 79
End: 2024-12-21
Payer: MEDICARE

## 2024-12-21 PROBLEM — D50.0 IRON DEFICIENCY ANEMIA DUE TO CHRONIC BLOOD LOSS: Status: ACTIVE | Noted: 2024-12-20

## 2024-12-21 LAB
ANION GAP SERPL CALCULATED.3IONS-SCNC: 10.6 MMOL/L (ref 5–15)
AORTIC DIMENSIONLESS INDEX: 0.3 (DI)
ASCENDING AORTA: 4.1 CM
AV MEAN PRESS GRAD SYS DOP V1V2: 21.3 MMHG
AV VMAX SYS DOP: 320.1 CM/SEC
BASOPHILS # BLD AUTO: 0.07 10*3/MM3 (ref 0–0.2)
BASOPHILS NFR BLD AUTO: 1.2 % (ref 0–1.5)
BH BB BLOOD EXPIRATION DATE: NORMAL
BH BB BLOOD EXPIRATION DATE: NORMAL
BH BB BLOOD TYPE BARCODE: 5100
BH BB BLOOD TYPE BARCODE: 5100
BH BB DISPENSE STATUS: NORMAL
BH BB DISPENSE STATUS: NORMAL
BH BB PRODUCT CODE: NORMAL
BH BB PRODUCT CODE: NORMAL
BH BB UNIT NUMBER: NORMAL
BH BB UNIT NUMBER: NORMAL
BH CV ECHO MEAS - AO MAX PG: 41 MMHG
BH CV ECHO MEAS - AO ROOT DIAM: 3.3 CM
BH CV ECHO MEAS - AO V2 VTI: 69.4 CM
BH CV ECHO MEAS - AVA(I,D): 1.12 CM2
BH CV ECHO MEAS - EDV(CUBED): 109.9 ML
BH CV ECHO MEAS - EDV(MOD-SP2): 135 ML
BH CV ECHO MEAS - EDV(MOD-SP4): 144 ML
BH CV ECHO MEAS - EF(MOD-SP2): 59.3 %
BH CV ECHO MEAS - EF(MOD-SP4): 51.4 %
BH CV ECHO MEAS - ESV(CUBED): 42.4 ML
BH CV ECHO MEAS - ESV(MOD-SP2): 55 ML
BH CV ECHO MEAS - ESV(MOD-SP4): 70 ML
BH CV ECHO MEAS - FS: 27.2 %
BH CV ECHO MEAS - IVS/LVPW: 0.79 CM
BH CV ECHO MEAS - IVSD: 0.96 CM
BH CV ECHO MEAS - LAT PEAK E' VEL: 9.7 CM/SEC
BH CV ECHO MEAS - LV DIASTOLIC VOL/BSA (35-75): 70.7 CM2
BH CV ECHO MEAS - LV MASS(C)D: 189 GRAMS
BH CV ECHO MEAS - LV MAX PG: 4.3 MMHG
BH CV ECHO MEAS - LV MEAN PG: 2.41 MMHG
BH CV ECHO MEAS - LV SYSTOLIC VOL/BSA (12-30): 34.4 CM2
BH CV ECHO MEAS - LV V1 MAX: 103.5 CM/SEC
BH CV ECHO MEAS - LV V1 VTI: 24.3 CM
BH CV ECHO MEAS - LVIDD: 4.8 CM
BH CV ECHO MEAS - LVIDS: 3.5 CM
BH CV ECHO MEAS - LVOT AREA: 3.2 CM2
BH CV ECHO MEAS - LVOT DIAM: 2.02 CM
BH CV ECHO MEAS - LVPWD: 1.21 CM
BH CV ECHO MEAS - MED PEAK E' VEL: 7.7 CM/SEC
BH CV ECHO MEAS - MR MAX PG: 22.7 MMHG
BH CV ECHO MEAS - MR MAX VEL: 238.1 CM/SEC
BH CV ECHO MEAS - MV A DUR: 0.12 SEC
BH CV ECHO MEAS - MV A MAX VEL: 141.4 CM/SEC
BH CV ECHO MEAS - MV DEC SLOPE: 543.5 CM/SEC2
BH CV ECHO MEAS - MV DEC TIME: 153 SEC
BH CV ECHO MEAS - MV E MAX VEL: 129 CM/SEC
BH CV ECHO MEAS - MV E/A: 0.91
BH CV ECHO MEAS - MV MAX PG: 7.8 MMHG
BH CV ECHO MEAS - MV MEAN PG: 3.5 MMHG
BH CV ECHO MEAS - MV P1/2T: 75.8 MSEC
BH CV ECHO MEAS - MV V2 VTI: 34.1 CM
BH CV ECHO MEAS - MVA(P1/2T): 2.9 CM2
BH CV ECHO MEAS - MVA(VTI): 2.29 CM2
BH CV ECHO MEAS - PA ACC TIME: 0.1 SEC
BH CV ECHO MEAS - PA V2 MAX: 123.5 CM/SEC
BH CV ECHO MEAS - PULM A REVS DUR: 0.1 SEC
BH CV ECHO MEAS - PULM A REVS VEL: 32.1 CM/SEC
BH CV ECHO MEAS - PULM DIAS VEL: 53.7 CM/SEC
BH CV ECHO MEAS - PULM S/D: 1.1
BH CV ECHO MEAS - PULM SYS VEL: 59.2 CM/SEC
BH CV ECHO MEAS - RAP SYSTOLE: 15 MMHG
BH CV ECHO MEAS - RV MAX PG: 1.79 MMHG
BH CV ECHO MEAS - RV V1 MAX: 66.8 CM/SEC
BH CV ECHO MEAS - RV V1 VTI: 15.5 CM
BH CV ECHO MEAS - RVSP: 37.3 MMHG
BH CV ECHO MEAS - SV(LVOT): 78 ML
BH CV ECHO MEAS - SV(MOD-SP2): 80 ML
BH CV ECHO MEAS - SV(MOD-SP4): 74 ML
BH CV ECHO MEAS - SVI(LVOT): 38.3 ML/M2
BH CV ECHO MEAS - SVI(MOD-SP2): 39.3 ML/M2
BH CV ECHO MEAS - SVI(MOD-SP4): 36.3 ML/M2
BH CV ECHO MEAS - TAPSE (>1.6): 1.75 CM
BH CV ECHO MEAS - TR MAX PG: 22.3 MMHG
BH CV ECHO MEAS - TR MAX VEL: 236.2 CM/SEC
BH CV ECHO MEASUREMENTS AVERAGE E/E' RATIO: 14.83
BH CV XLRA - RV BASE: 3.2 CM
BH CV XLRA - TDI S': 16.5 CM/SEC
BUN SERPL-MCNC: 18 MG/DL (ref 8–23)
BUN/CREAT SERPL: 17 (ref 7–25)
CALCIUM SPEC-SCNC: 8.5 MG/DL (ref 8.6–10.5)
CHLORIDE SERPL-SCNC: 104 MMOL/L (ref 98–107)
CO2 SERPL-SCNC: 24.4 MMOL/L (ref 22–29)
CREAT SERPL-MCNC: 1.06 MG/DL (ref 0.57–1)
CROSSMATCH INTERPRETATION: NORMAL
CROSSMATCH INTERPRETATION: NORMAL
DEPRECATED RDW RBC AUTO: 45.9 FL (ref 37–54)
EGFRCR SERPLBLD CKD-EPI 2021: 53.9 ML/MIN/1.73
EOSINOPHIL # BLD AUTO: 0.07 10*3/MM3 (ref 0–0.4)
EOSINOPHIL NFR BLD AUTO: 1.2 % (ref 0.3–6.2)
ERYTHROCYTE [DISTWIDTH] IN BLOOD BY AUTOMATED COUNT: 17.7 % (ref 12.3–15.4)
GLUCOSE SERPL-MCNC: 107 MG/DL (ref 65–99)
HCT VFR BLD AUTO: 25.7 % (ref 34–46.6)
HCT VFR BLD AUTO: 28 % (ref 34–46.6)
HGB BLD-MCNC: 7.9 G/DL (ref 12–15.9)
HGB BLD-MCNC: 8.6 G/DL (ref 12–15.9)
IMM GRANULOCYTES # BLD AUTO: 0.02 10*3/MM3 (ref 0–0.05)
IMM GRANULOCYTES NFR BLD AUTO: 0.3 % (ref 0–0.5)
LEFT ATRIUM VOLUME INDEX: 32.5 ML/M2
LV EF BIPLANE MOD: 56.8 %
LYMPHOCYTES # BLD AUTO: 1.48 10*3/MM3 (ref 0.7–3.1)
LYMPHOCYTES NFR BLD AUTO: 24.5 % (ref 19.6–45.3)
MAGNESIUM SERPL-MCNC: 2.2 MG/DL (ref 1.6–2.4)
MCH RBC QN AUTO: 22.5 PG (ref 26.6–33)
MCHC RBC AUTO-ENTMCNC: 30.7 G/DL (ref 31.5–35.7)
MCV RBC AUTO: 73.2 FL (ref 79–97)
MONOCYTES # BLD AUTO: 1.02 10*3/MM3 (ref 0.1–0.9)
MONOCYTES NFR BLD AUTO: 16.9 % (ref 5–12)
NEUTROPHILS NFR BLD AUTO: 3.37 10*3/MM3 (ref 1.7–7)
NEUTROPHILS NFR BLD AUTO: 55.9 % (ref 42.7–76)
PLATELET # BLD AUTO: 277 10*3/MM3 (ref 140–450)
PMV BLD AUTO: 9.4 FL (ref 6–12)
POTASSIUM SERPL-SCNC: 3.6 MMOL/L (ref 3.5–5.2)
POTASSIUM SERPL-SCNC: 3.8 MMOL/L (ref 3.5–5.2)
QT INTERVAL: 392 MS
QTC INTERVAL: 469 MS
RBC # BLD AUTO: 3.51 10*6/MM3 (ref 3.77–5.28)
SODIUM SERPL-SCNC: 139 MMOL/L (ref 136–145)
UNIT  ABO: NORMAL
UNIT  ABO: NORMAL
UNIT  RH: NORMAL
UNIT  RH: NORMAL
WBC NRBC COR # BLD AUTO: 6.03 10*3/MM3 (ref 3.4–10.8)

## 2024-12-21 PROCEDURE — 25010000002 NA FERRIC GLUC CPLX PER 12.5 MG: Performed by: HOSPITALIST

## 2024-12-21 PROCEDURE — 85014 HEMATOCRIT: CPT | Performed by: INTERNAL MEDICINE

## 2024-12-21 PROCEDURE — 99231 SBSQ HOSP IP/OBS SF/LOW 25: CPT | Performed by: INTERNAL MEDICINE

## 2024-12-21 PROCEDURE — 93306 TTE W/DOPPLER COMPLETE: CPT

## 2024-12-21 PROCEDURE — 93010 ELECTROCARDIOGRAM REPORT: CPT | Performed by: INTERNAL MEDICINE

## 2024-12-21 PROCEDURE — 85025 COMPLETE CBC W/AUTO DIFF WBC: CPT | Performed by: HOSPITALIST

## 2024-12-21 PROCEDURE — 25510000001 PERFLUTREN 6.52 MG/ML SUSPENSION 2 ML VIAL: Performed by: HOSPITALIST

## 2024-12-21 PROCEDURE — 99232 SBSQ HOSP IP/OBS MODERATE 35: CPT | Performed by: STUDENT IN AN ORGANIZED HEALTH CARE EDUCATION/TRAINING PROGRAM

## 2024-12-21 PROCEDURE — 83735 ASSAY OF MAGNESIUM: CPT | Performed by: HOSPITALIST

## 2024-12-21 PROCEDURE — 63710000001 DIPHENHYDRAMINE PER 50 MG: Performed by: NURSE PRACTITIONER

## 2024-12-21 PROCEDURE — 25810000003 SODIUM CHLORIDE 0.9 % SOLUTION: Performed by: HOSPITALIST

## 2024-12-21 PROCEDURE — 84132 ASSAY OF SERUM POTASSIUM: CPT | Performed by: INTERNAL MEDICINE

## 2024-12-21 PROCEDURE — 80048 BASIC METABOLIC PNL TOTAL CA: CPT | Performed by: HOSPITALIST

## 2024-12-21 PROCEDURE — 93005 ELECTROCARDIOGRAM TRACING: CPT | Performed by: INTERNAL MEDICINE

## 2024-12-21 PROCEDURE — 63710000001 DIPHENHYDRAMINE PER 50 MG: Performed by: INTERNAL MEDICINE

## 2024-12-21 PROCEDURE — 85018 HEMOGLOBIN: CPT | Performed by: INTERNAL MEDICINE

## 2024-12-21 PROCEDURE — 93306 TTE W/DOPPLER COMPLETE: CPT | Performed by: INTERNAL MEDICINE

## 2024-12-21 RX ORDER — DIPHENHYDRAMINE HCL 25 MG
25 CAPSULE ORAL ONCE
Status: COMPLETED | OUTPATIENT
Start: 2024-12-21 | End: 2024-12-21

## 2024-12-21 RX ORDER — ACETAMINOPHEN 325 MG/1
650 TABLET ORAL ONCE
Status: COMPLETED | OUTPATIENT
Start: 2024-12-21 | End: 2024-12-21

## 2024-12-21 RX ORDER — POTASSIUM CHLORIDE 750 MG/1
40 TABLET, FILM COATED, EXTENDED RELEASE ORAL EVERY 4 HOURS
Status: DISPENSED | OUTPATIENT
Start: 2024-12-21 | End: 2024-12-21

## 2024-12-21 RX ADMIN — DIPHENHYDRAMINE HYDROCHLORIDE 25 MG: 25 CAPSULE ORAL at 09:28

## 2024-12-21 RX ADMIN — ASPIRIN 81 MG: 81 TABLET, COATED ORAL at 09:29

## 2024-12-21 RX ADMIN — ACETAMINOPHEN 650 MG: 325 TABLET ORAL at 09:28

## 2024-12-21 RX ADMIN — POLYETHYLENE GLYCOL 3350 AND ELECTROLYTES WITH LEMON FLAVOR 2000 ML: 236; 22.74; 6.74; 5.86; 2.97 POWDER, FOR SOLUTION ORAL at 18:16

## 2024-12-21 RX ADMIN — PANTOPRAZOLE SODIUM 40 MG: 40 TABLET, DELAYED RELEASE ORAL at 05:05

## 2024-12-21 RX ADMIN — DIPHENHYDRAMINE HYDROCHLORIDE 25 MG: 25 CAPSULE ORAL at 20:05

## 2024-12-21 RX ADMIN — POTASSIUM CHLORIDE 40 MEQ: 750 TABLET, EXTENDED RELEASE ORAL at 18:16

## 2024-12-21 RX ADMIN — Medication 10 ML: at 20:06

## 2024-12-21 RX ADMIN — LISINOPRIL 40 MG: 20 TABLET ORAL at 09:28

## 2024-12-21 RX ADMIN — PERFLUTREN 2 ML: 6.52 INJECTION, SUSPENSION INTRAVENOUS at 16:54

## 2024-12-21 RX ADMIN — VERAPAMIL HYDROCHLORIDE 180 MG: 180 TABLET, FILM COATED, EXTENDED RELEASE ORAL at 18:16

## 2024-12-21 RX ADMIN — SODIUM CHLORIDE 250 MG: 9 INJECTION, SOLUTION INTRAVENOUS at 09:28

## 2024-12-21 RX ADMIN — Medication 10 ML: at 09:29

## 2024-12-21 RX ADMIN — PANTOPRAZOLE SODIUM 40 MG: 40 TABLET, DELAYED RELEASE ORAL at 18:16

## 2024-12-21 NOTE — SIGNIFICANT NOTE
12/21/24 1131   OTHER   Discipline occupational therapist   Rehab Time/Intention   Session Not Performed other (see comments)  (OT spoke to pt at bedside, she is getting up independently for her ADLs. No OT needs identified)

## 2024-12-21 NOTE — PROGRESS NOTES
LOS: 1 day   Patient Care Team:  Provider, No Known as PCP - General    Chief Complaint:  Following for TAVR, dyspnea    Interval History:   Telemetry overnight showed blocked PACs, no evidence of high-grade blocks.  Patient without symptoms.  Possible endoscopies tomorrow    Objective   Vital Signs  Temp:  [97.8 °F (36.6 °C)-99.4 °F (37.4 °C)] 98 °F (36.7 °C)  Heart Rate:  [] 95  Resp:  [18-22] 18  BP: (110-168)/(43-95) 154/87    Intake/Output Summary (Last 24 hours) at 12/21/2024 0813  Last data filed at 12/20/2024 2340  Gross per 24 hour   Intake 962.5 ml   Output --   Net 962.5 ml       Comfortable NAD  PERRL, conjunctivae clear  Neck supple, no JVD or thyromegaly appreciated  S1/S2 RRR, faint systolic murmur at left upper sternal border.  Intermittent atrial ectopy  Lungs CTA B, normal effort  Abdomen S/NT/ND (+) BS, no HSM appreciated  Extremities warm, no clubbing, cyanosis, trace lower extremity edema   No visible or palpable skin lesions  A/O x 3, mood and affect appropriate    Results Review:      Results from last 7 days   Lab Units 12/21/24  0549 12/20/24  1313   SODIUM mmol/L 139 136   POTASSIUM mmol/L 3.6 4.0   CHLORIDE mmol/L 104 106   CO2 mmol/L 24.4 21.0*   BUN mg/dL 18 17   CREATININE mg/dL 1.06* 0.91   GLUCOSE mg/dL 107* 102*   CALCIUM mg/dL 8.5* 8.6     Results from last 7 days   Lab Units 12/20/24  1404 12/20/24  1313   HSTROP T ng/L 13 14*     Results from last 7 days   Lab Units 12/21/24  0549 12/20/24  1313   WBC 10*3/mm3 6.03 7.42   HEMOGLOBIN g/dL 7.9* 6.1*   HEMATOCRIT % 25.7* 21.0*   PLATELETS 10*3/mm3 277 356             Results from last 7 days   Lab Units 12/21/24  0549   MAGNESIUM mg/dL 2.2           I reviewed the patient's new clinical results.  I personally viewed and interpreted the patient's EKG/Telemetry data        Medication Review:   acetaminophen, 650 mg, Oral, Once  aspirin, 81 mg, Oral, Daily  diphenhydrAMINE, 25 mg, Oral, Once  ferric gluconate, 250 mg,  Intravenous, Daily  lisinopril, 40 mg, Oral, Daily  pantoprazole, 40 mg, Oral, BID AC  sodium chloride, 10 mL, Intravenous, Q12H  verapamil SR, 180 mg, Oral, Daily             Assessment & Plan       Symptomatic anemia       Symptomatic anemia: Workup ongoing per medicine team.  In the event that she needs a GI workup for endoscopies, she may proceed with endoscopies with acceptable, low, risk.    Continue aspirin 81   Trending hemoglobin   Goal hemoglobin 8 currently 7.9 this morning.  Monitor    GI following, possible endoscopy tomorrow.  Okay to proceed from my standpoint.  History of CAD status post PCI: Continue aspirin 81.  LAD PCI December 2023.  Okay to stay off Plavix for now.  Continue aspirin 81 indefinitely.  Severe aortic stenosis status post TAVR, 26 mm Tate LAKEISHA February 2024: Aspirin 81 per above.  Lower extremity edema, concerns for CHF.  Check echo, pending  I suspect this may be high-output CHF related to anemia  Monitor after transfusions.  Her chest x-ray does not look that wet thankfully  Tachycardia.  Sinus tachycardia with APCs, versus MAT.  Likely physiologic response due to anemia.  She did not tolerate metoprolol in the past  She is on verapamil, lower extremity edema may be multifactorial due to this  We will see what her heart rate does with transfusions, if remains elevated we may need to increase calcium channel blocker.  For now continue current course  Chest pain.  Secondary to anemia.  Troponin trend does not suggest ACS.  No evidence of A-fib     Thank you for the consult.  Will see again Monday after endoscopies completed tomorrow.  Please reach out any questions or concerns.    Varghese Napier MD  12/21/24  08:13 EST      Part of this note may be an electronic transcription/translation of spoken language to printed text using the Dragon Dictation System.

## 2024-12-21 NOTE — PLAN OF CARE
Pt oriented vs stable K replaced fe transfusion up adlib. 2D echo completed      Problem: Adult Inpatient Plan of Care  Goal: Plan of Care Review  Outcome: Progressing  Goal: Patient-Specific Goal (Individualized)  Outcome: Progressing  Goal: Absence of Hospital-Acquired Illness or Injury  Outcome: Progressing  Intervention: Identify and Manage Fall Risk  Recent Flowsheet Documentation  Taken 12/21/2024 1400 by Radha Sevilla RN  Safety Promotion/Fall Prevention:   activity supervised   assistive device/personal items within reach   clutter free environment maintained   fall prevention program maintained   nonskid shoes/slippers when out of bed   room organization consistent   safety round/check completed  Taken 12/21/2024 0928 by Radha Sevilla RN  Safety Promotion/Fall Prevention:   activity supervised   assistive device/personal items within reach   clutter free environment maintained   fall prevention program maintained   muscle strengthening facilitated   room organization consistent   safety round/check completed  Intervention: Prevent Skin Injury  Recent Flowsheet Documentation  Taken 12/21/2024 1600 by Radha Sevilla RN  Body Position: position changed independently  Taken 12/21/2024 1400 by Radha Sevilla RN  Body Position: position changed independently  Taken 12/21/2024 0928 by Radha Sevilla RN  Body Position: position changed independently  Intervention: Prevent Infection  Recent Flowsheet Documentation  Taken 12/21/2024 1600 by Radha Sevilla RN  Infection Prevention:   rest/sleep promoted   single patient room provided  Taken 12/21/2024 1400 by Radha Sevilla RN  Infection Prevention:   rest/sleep promoted   single patient room provided  Taken 12/21/2024 1200 by Radha Sevilla RN  Infection Prevention:   rest/sleep promoted   single patient room provided  Taken 12/21/2024 1000 by Radha Sevilla RN  Infection Prevention:   single patient room provided   rest/sleep promoted  Taken  12/21/2024 0928 by Radha Sevilla RN  Infection Prevention:   single patient room provided   rest/sleep promoted  Goal: Optimal Comfort and Wellbeing  Outcome: Progressing  Intervention: Provide Person-Centered Care  Recent Flowsheet Documentation  Taken 12/21/2024 1400 by Radha Sevilla RN  Trust Relationship/Rapport: care explained  Taken 12/21/2024 0928 by Radha Sevilla RN  Trust Relationship/Rapport:   care explained   thoughts/feelings acknowledged   reassurance provided   questions encouraged   questions answered   emotional support provided  Goal: Readiness for Transition of Care  Outcome: Progressing     Problem: Comorbidity Management  Goal: Maintenance of Heart Failure Symptom Control  Outcome: Progressing  Intervention: Maintain Heart Failure Management  Recent Flowsheet Documentation  Taken 12/21/2024 1400 by Radha Sevilla RN  Medication Review/Management: medications reviewed  Taken 12/21/2024 0928 by Radha Sevilla RN  Medication Review/Management: medications reviewed   Goal Outcome Evaluation:

## 2024-12-21 NOTE — PROGRESS NOTES
Name: Arlene Farrell ADMIT: 2024   : 1945  PCP: Provider, No Known    MRN: [id number] LOS: 1 days   AGE/SEX: 79 y.o. female  ROOM: [facility]   Subjective   Chief Complaint   Patient presents with    Chest Pain    Shortness of Breath    Edema     80 yo with history of CAD, AS s/p TAVR 2024 who presents with dyspnea with exertion. She is found to have anemia with Hgb of 6.1    S/p prbc  Denies any bleeding  Feels a little better  LE edema improved    ROS  No f/c  No n/v  No cp/palp  No soa/cough    Objective   Vital Signs  Temp:  [97.8 °F (36.6 °C)-99.4 °F (37.4 °C)] 98.2 °F (36.8 °C)  Heart Rate:  [] 92  Resp:  [18-22] 18  BP: (110-168)/(43-95) 140/64  SpO2:  [94 %-100 %] 94 %  on   ;   Device (Oxygen Therapy): room air  Body mass index is 37.19 kg/m².    Physical Exam  Constitutional:       General: She is not in acute distress.  HENT:      Head: Normocephalic and atraumatic.   Eyes:      General: No scleral icterus.  Cardiovascular:      Rate and Rhythm: Normal rate.   Pulmonary:      Effort: Pulmonary effort is normal. No respiratory distress.   Abdominal:      General: There is no distension.      Palpations: Abdomen is soft.   Musculoskeletal:      Cervical back: Neck supple.      Right lower leg: Edema present.      Left lower leg: Edema present.   Skin:     Coloration: Skin is pale.   Neurological:      Mental Status: She is alert.   Psychiatric:         Behavior: Behavior normal.         Results Review:       I reviewed the patient's new clinical results.  Results from last 7 days   Lab Units 24  0549 24  1313   WBC 10*3/mm3 6.03 7.42   HEMOGLOBIN g/dL 7.9* 6.1*   PLATELETS 10*3/mm3 277 356     Results from last 7 days   Lab Units 24  0549 24  1313   SODIUM mmol/L 139 136   POTASSIUM mmol/L 3.6 4.0   CHLORIDE mmol/L 104 106   CO2 mmol/L 24.4 21.0*   BUN mg/dL 18 17   CREATININE mg/dL 1.06* 0.91   GLUCOSE mg/dL 107* 102*   Estimated Creatinine Clearance: 50.7  "mL/min (A) (by C-G formula based on SCr of 1.06 mg/dL (H)).  Results from last 7 days   Lab Units 12/20/24  1313   ALBUMIN g/dL 3.3*   BILIRUBIN mg/dL 0.4   ALK PHOS U/L 74   AST (SGOT) U/L 17   ALT (SGPT) U/L 10     Results from last 7 days   Lab Units 12/21/24  0549 12/20/24  1313   CALCIUM mg/dL 8.5* 8.6   ALBUMIN g/dL  --  3.3*   MAGNESIUM mg/dL 2.2  --          Coag     HbA1C   Lab Results   Component Value Date    HGBA1C 6.00 (H) 12/20/2024    HGBA1C 5.70 (H) 02/02/2024    HGBA1C 6.40 (H) 12/15/2023     Infection     Radiology(recent) XR Chest 1 View    Result Date: 12/20/2024  No focal pulmonary consolidation. Borderline heart size. Tortuous aorta. Follow-up as clinically indicated.  This report was finalized on 12/20/2024 12:37 PM by Dr. Eliel Cifuentes M.D on Workstation: [id number]     HS Troponin T   Date Value Ref Range Status   12/20/2024 13 <14 ng/L Final   12/20/2024 14 (H) <14 ng/L Final     No components found for: \"TSH;2\"    aspirin, 81 mg, Oral, Daily  ferric gluconate, 250 mg, Intravenous, Daily  lisinopril, 40 mg, Oral, Daily  pantoprazole, 40 mg, Oral, BID AC  polyethylene glycol, 2,000 mL, Oral, Q12H  potassium chloride ER, 40 mEq, Oral, Q4H  sodium chloride, 10 mL, Intravenous, Q12H  verapamil SR, 180 mg, Oral, Daily       Diet: Liquid; Clear Liquid; Fluid Consistency: Thin (IDDSI 0)      Assessment & Plan      Active Hospital Problems    Diagnosis  POA    **Symptomatic anemia [D64.9]  Yes    Iron deficiency anemia due to chronic blood loss [D50.0]  Yes    S/P TAVR (transcatheter aortic valve replacement) [Z95.2]  Not Applicable    Hypertension [I10]  Yes    HLD (hyperlipidemia) [E78.5]  Yes    Coronary artery disease involving native coronary artery of native heart with angina pectoris [I25.119]  Yes    Nonrheumatic aortic valve stenosis [I35.0]  Yes      Resolved Hospital Problems   No resolved problems to display.     80 yo with history of CAD, AS s/p TAVR Feb 2024 who presents with " dyspnea with exertion. She is found to have anemia with Hgb of 6.1    S/p PRBC and will have IV iron as well. Monitor H/H. GI following and plan on endoscopy  Cardiology follow for CHF, CAD, tachycardia. On ASA. Verapamil  Off plavix for now with plans on endoscopy.   S/p lasix on 12/20, will see Cardiology thoughts today. I wander they are planning on an echo.      Reviewed records      Vazquez Carbajal MD  [facility]  12/21/24  07:54 EST

## 2024-12-21 NOTE — PLAN OF CARE
Goal Outcome Evaluation:      Pt alert and oriented. VSS. On RA. Pt received 2 units of PRBCs and 1 ferric gluconate and tolerated well. CTU called this morning with rhythm change to 2nd degree block type 2 or possible 3rd degree block. Order STAT EKG which showed NSR; supraventricular bigeminy. Pt asymptomatic. Plan of care ongoing.

## 2024-12-21 NOTE — PROGRESS NOTES
[facility]  Inpatient Progress Note    Reason for Follow Up: Moderately severe iron deficiency anemia    Subjective     Interval History:   Patient had presented yesterday with moderately severe symptomatic iron deficiency anemia.  Patient had low hemoglobin along with low MCV and low indices of iron and percentage saturation.  Patient denies any overt GI blood loss.  She took last dose of Plavix about a month ago.    Current Facility-Administered Medications:     acetaminophen (TYLENOL) tablet 650 mg, 650 mg, Oral, Q4H PRN **OR** acetaminophen (TYLENOL) 160 MG/5ML oral solution 650 mg, 650 mg, Oral, Q4H PRN **OR** acetaminophen (TYLENOL) suppository 650 mg, 650 mg, Rectal, Q4H PRN, Anselmo Sandoval MD    acetaminophen (TYLENOL) tablet 650 mg, 650 mg, Oral, Once, Vazquez Carbajal MD    aspirin EC tablet 81 mg, 81 mg, Oral, Daily, Varghese Napier MD    sennosides-docusate (PERICOLACE) 8.6-50 MG per tablet 2 tablet, 2 tablet, Oral, BID PRN **AND** polyethylene glycol (MIRALAX) packet 17 g, 17 g, Oral, Daily PRN **AND** bisacodyl (DULCOLAX) EC tablet 5 mg, 5 mg, Oral, Daily PRN **AND** bisacodyl (DULCOLAX) suppository 10 mg, 10 mg, Rectal, Daily PRN, Anselmo Sandoval MD    Calcium Replacement - Follow Nurse / BPA Driven Protocol, , Not Applicable, PRN, Anselmo Sandoval MD    diphenhydrAMINE (BENADRYL) capsule 25 mg, 25 mg, Oral, Once, Vazquez Carbajal MD    ferric gluconate (FERRLECIT) 250 MG in sodium chloride 0.9% 250 mL IVPB, 250 mg, Intravenous, Daily, Anselmo Sandoval MD, Last Rate: 125 mL/hr at 12/20/24 2341, 250 mg at 12/20/24 2341    lisinopril (PRINIVIL,ZESTRIL) tablet 40 mg, 40 mg, Oral, Daily, Anselmo Sandoval MD, 40 mg at 12/20/24 1723    Magnesium Standard Dose Replacement - Follow Nurse / BPA Driven Protocol, , Not Applicable, PRN, Anselmo Sandoval MD    nitroglycerin (NITROSTAT) SL tablet 0.4 mg, 0.4 mg, Sublingual, Q5 Min PRN, Anselmo Sandoval,  MD    ondansetron ODT (ZOFRAN-ODT) disintegrating tablet 4 mg, 4 mg, Oral, Q6H PRN **OR** ondansetron (ZOFRAN) injection 4 mg, 4 mg, Intravenous, Q6H PRN, Anselmo Sandoval MD    pantoprazole (PROTONIX) EC tablet 40 mg, 40 mg, Oral, BID AC, Anselmo Sandoval MD, 40 mg at 12/21/24 0505    Phosphorus Replacement - Follow Nurse / BPA Driven Protocol, , Not Applicable, PRNLori Emmett J., MD    polyethylene glycol (GoLYTELY) solution 2,000 mL, 2,000 mL, Oral, Q12H, Mat Corbett MD    Potassium Replacement - Follow Nurse / BPA Driven Protocol, , Not Applicable, PRANIRUDH, Anselmo Sandoval MD    [COMPLETED] Insert Peripheral IV, , , Once **AND** sodium chloride 0.9 % flush 10 mL, 10 mL, Intravenous, PRN, Anselmo Sandoval MD    sodium chloride 0.9 % flush 10 mL, 10 mL, Intravenous, Q12H, Anselmo Sandoval MD, 10 mL at 12/20/24 2146    sodium chloride 0.9 % flush 10 mL, 10 mL, Intravenous, PRN, Anselmo Sandoval MD    sodium chloride 0.9 % infusion 40 mL, 40 mL, Intravenous, PRN, Anselmo Sadnoval MD    verapamil SR (CALAN-SR) CR tablet 180 mg, 180 mg, Oral, Daily, Anselmo Sandoval MD, 180 mg at 12/20/24 2145  Review of Systems:    All systems were reviewed and negative except for:  Gastrointestinal: positive for  See HPI    Objective     Vital Signs  Temp:  [97.8 °F (36.6 °C)-99.4 °F (37.4 °C)] 98 °F (36.7 °C)  Heart Rate:  [] 95  Resp:  [18-22] 18  BP: (110-168)/(43-95) 154/87  Body mass index is 37.19 kg/m².    Intake/Output Summary (Last 24 hours) at 12/21/2024 0901  Last data filed at 12/20/2024 2340  Gross per 24 hour   Intake 962.5 ml   Output --   Net 962.5 ml     No intake/output data recorded.     Physical Exam:   General: patient awake, alert and cooperative   Eyes: Normal lids and lashes, no scleral icterus   Neck: supple, normal ROM   Skin: warm and dry, not jaundiced   Cardiovascular: regular rhythm and rate, no murmurs auscultated   Pulm: clear to auscultation bilaterally,  "regular and unlabored   Abdomen: soft, nontender, nondistended; normal bowel sounds   Extremities: no rash or edema   Psychiatric: Normal mood and behavior; memory intact     Results Review:     I reviewed the patient's new clinical results.    Results from last 7 days   Lab Units 12/21/24  0549 12/20/24  1313   WBC 10*3/mm3 6.03 7.42   HEMOGLOBIN g/dL 7.9* 6.1*   HEMATOCRIT % 25.7* 21.0*   PLATELETS 10*3/mm3 277 356     Results from last 7 days   Lab Units 12/21/24  0549 12/20/24  1313   SODIUM mmol/L 139 136   POTASSIUM mmol/L 3.6 4.0   CHLORIDE mmol/L 104 106   CO2 mmol/L 24.4 21.0*   BUN mg/dL 18 17   CREATININE mg/dL 1.06* 0.91   CALCIUM mg/dL 8.5* 8.6   BILIRUBIN mg/dL  --  0.4   ALK PHOS U/L  --  74   ALT (SGPT) U/L  --  10   AST (SGOT) U/L  --  17   GLUCOSE mg/dL 107* 102*         No results found for: \"LIPASE\"    Radiology:  XR Chest 1 View   Final Result   No focal pulmonary consolidation. Borderline heart size.   Tortuous aorta. Follow-up as clinically indicated.       This report was finalized on 12/20/2024 12:37 PM by Dr. Eliel Cifuentes M.D on Workstation: [id number]              Assessment & Plan     Active Hospital Problems    Diagnosis     **Symptomatic anemia     Iron deficiency anemia due to chronic blood loss        Assessment:  Patient with moderately severe iron deficiency anemia without any overt GI blood loss.  Posttransfusion hemoglobin today 7.9.      Plan:  An upper GI endoscopy and a colonoscopy is being scheduled for tomorrow, after the prep later today.  Pros and cons of the procedures and potential risks and complications were discussed with the patient and she was reassured.    I discussed the patients findings and my recommendations with patient.            Mat Corbett M.D.  [facility]  [phone]           " Patient

## 2024-12-22 ENCOUNTER — ANESTHESIA EVENT (OUTPATIENT)
Dept: GASTROENTEROLOGY | Facility: HOSPITAL | Age: 79
End: 2024-12-22
Payer: MEDICARE

## 2024-12-22 ENCOUNTER — ANESTHESIA (OUTPATIENT)
Dept: GASTROENTEROLOGY | Facility: HOSPITAL | Age: 79
End: 2024-12-22
Payer: MEDICARE

## 2024-12-22 LAB
ANION GAP SERPL CALCULATED.3IONS-SCNC: 9 MMOL/L (ref 5–15)
BASOPHILS # BLD AUTO: 0.08 10*3/MM3 (ref 0–0.2)
BASOPHILS NFR BLD AUTO: 1.1 % (ref 0–1.5)
BUN SERPL-MCNC: 14 MG/DL (ref 8–23)
BUN/CREAT SERPL: 15.9 (ref 7–25)
CALCIUM SPEC-SCNC: 8.9 MG/DL (ref 8.6–10.5)
CHLORIDE SERPL-SCNC: 107 MMOL/L (ref 98–107)
CO2 SERPL-SCNC: 23 MMOL/L (ref 22–29)
CREAT SERPL-MCNC: 0.88 MG/DL (ref 0.57–1)
DEPRECATED RDW RBC AUTO: 45.6 FL (ref 37–54)
EGFRCR SERPLBLD CKD-EPI 2021: 67.4 ML/MIN/1.73
EOSINOPHIL # BLD AUTO: 0.18 10*3/MM3 (ref 0–0.4)
EOSINOPHIL NFR BLD AUTO: 2.4 % (ref 0.3–6.2)
ERYTHROCYTE [DISTWIDTH] IN BLOOD BY AUTOMATED COUNT: 17.6 % (ref 12.3–15.4)
GLUCOSE SERPL-MCNC: 93 MG/DL (ref 65–99)
HCT VFR BLD AUTO: 27.4 % (ref 34–46.6)
HGB BLD-MCNC: 8.4 G/DL (ref 12–15.9)
IMM GRANULOCYTES # BLD AUTO: 0.02 10*3/MM3 (ref 0–0.05)
IMM GRANULOCYTES NFR BLD AUTO: 0.3 % (ref 0–0.5)
LYMPHOCYTES # BLD AUTO: 1.37 10*3/MM3 (ref 0.7–3.1)
LYMPHOCYTES NFR BLD AUTO: 18.1 % (ref 19.6–45.3)
MAGNESIUM SERPL-MCNC: 2.2 MG/DL (ref 1.6–2.4)
MCH RBC QN AUTO: 22.2 PG (ref 26.6–33)
MCHC RBC AUTO-ENTMCNC: 30.7 G/DL (ref 31.5–35.7)
MCV RBC AUTO: 72.5 FL (ref 79–97)
MONOCYTES # BLD AUTO: 0.96 10*3/MM3 (ref 0.1–0.9)
MONOCYTES NFR BLD AUTO: 12.7 % (ref 5–12)
NEUTROPHILS NFR BLD AUTO: 4.96 10*3/MM3 (ref 1.7–7)
NEUTROPHILS NFR BLD AUTO: 65.4 % (ref 42.7–76)
PLATELET # BLD AUTO: 315 10*3/MM3 (ref 140–450)
PMV BLD AUTO: 9.2 FL (ref 6–12)
POTASSIUM SERPL-SCNC: 3.8 MMOL/L (ref 3.5–5.2)
RBC # BLD AUTO: 3.78 10*6/MM3 (ref 3.77–5.28)
SODIUM SERPL-SCNC: 139 MMOL/L (ref 136–145)
WBC NRBC COR # BLD AUTO: 7.57 10*3/MM3 (ref 3.4–10.8)

## 2024-12-22 PROCEDURE — 25010000002 DIPHENHYDRAMINE PER 50 MG: Performed by: INTERNAL MEDICINE

## 2024-12-22 PROCEDURE — 25010000002 NA FERRIC GLUC CPLX PER 12.5 MG: Performed by: HOSPITALIST

## 2024-12-22 PROCEDURE — 25010000002 LIDOCAINE 2% SOLUTION: Performed by: ANESTHESIOLOGY

## 2024-12-22 PROCEDURE — 25010000002 PROPOFOL 10 MG/ML EMULSION: Performed by: ANESTHESIOLOGY

## 2024-12-22 PROCEDURE — 43239 EGD BIOPSY SINGLE/MULTIPLE: CPT | Performed by: INTERNAL MEDICINE

## 2024-12-22 PROCEDURE — 80048 BASIC METABOLIC PNL TOTAL CA: CPT | Performed by: HOSPITALIST

## 2024-12-22 PROCEDURE — 88305 TISSUE EXAM BY PATHOLOGIST: CPT | Performed by: INTERNAL MEDICINE

## 2024-12-22 PROCEDURE — 83735 ASSAY OF MAGNESIUM: CPT | Performed by: INTERNAL MEDICINE

## 2024-12-22 PROCEDURE — 85025 COMPLETE CBC W/AUTO DIFF WBC: CPT | Performed by: HOSPITALIST

## 2024-12-22 PROCEDURE — 0DJD8ZZ INSPECTION OF LOWER INTESTINAL TRACT, VIA NATURAL OR ARTIFICIAL OPENING ENDOSCOPIC: ICD-10-PCS | Performed by: INTERNAL MEDICINE

## 2024-12-22 PROCEDURE — 0DB98ZX EXCISION OF DUODENUM, VIA NATURAL OR ARTIFICIAL OPENING ENDOSCOPIC, DIAGNOSTIC: ICD-10-PCS | Performed by: INTERNAL MEDICINE

## 2024-12-22 PROCEDURE — 25810000003 SODIUM CHLORIDE 0.9 % SOLUTION: Performed by: HOSPITALIST

## 2024-12-22 PROCEDURE — 45378 DIAGNOSTIC COLONOSCOPY: CPT | Performed by: INTERNAL MEDICINE

## 2024-12-22 RX ORDER — PROPOFOL 10 MG/ML
VIAL (ML) INTRAVENOUS AS NEEDED
Status: DISCONTINUED | OUTPATIENT
Start: 2024-12-22 | End: 2024-12-22 | Stop reason: SURG

## 2024-12-22 RX ORDER — LIDOCAINE HYDROCHLORIDE 20 MG/ML
INJECTION, SOLUTION INFILTRATION; PERINEURAL AS NEEDED
Status: DISCONTINUED | OUTPATIENT
Start: 2024-12-22 | End: 2024-12-22 | Stop reason: SURG

## 2024-12-22 RX ORDER — ACETAMINOPHEN 325 MG/1
650 TABLET ORAL ONCE
Status: COMPLETED | OUTPATIENT
Start: 2024-12-22 | End: 2024-12-22

## 2024-12-22 RX ORDER — SODIUM CHLORIDE 9 MG/ML
30 INJECTION, SOLUTION INTRAVENOUS CONTINUOUS
Status: ACTIVE | OUTPATIENT
Start: 2024-12-22 | End: 2024-12-22

## 2024-12-22 RX ORDER — DIPHENHYDRAMINE HYDROCHLORIDE 50 MG/ML
25 INJECTION INTRAMUSCULAR; INTRAVENOUS ONCE
Status: COMPLETED | OUTPATIENT
Start: 2024-12-22 | End: 2024-12-22

## 2024-12-22 RX ORDER — SODIUM CHLORIDE 9 MG/ML
INJECTION, SOLUTION INTRAVENOUS CONTINUOUS PRN
Status: DISCONTINUED | OUTPATIENT
Start: 2024-12-22 | End: 2024-12-22 | Stop reason: SURG

## 2024-12-22 RX ADMIN — PANTOPRAZOLE SODIUM 40 MG: 40 TABLET, DELAYED RELEASE ORAL at 18:09

## 2024-12-22 RX ADMIN — LIDOCAINE HYDROCHLORIDE 60 MG: 20 INJECTION, SOLUTION INFILTRATION; PERINEURAL at 09:27

## 2024-12-22 RX ADMIN — SODIUM CHLORIDE 250 MG: 9 INJECTION, SOLUTION INTRAVENOUS at 13:00

## 2024-12-22 RX ADMIN — ASPIRIN 81 MG: 81 TABLET, COATED ORAL at 12:57

## 2024-12-22 RX ADMIN — DIPHENHYDRAMINE HYDROCHLORIDE 25 MG: 50 INJECTION, SOLUTION INTRAMUSCULAR; INTRAVENOUS at 12:57

## 2024-12-22 RX ADMIN — PROPOFOL 200 MCG/KG/MIN: 10 INJECTION, EMULSION INTRAVENOUS at 09:27

## 2024-12-22 RX ADMIN — Medication 10 ML: at 13:00

## 2024-12-22 RX ADMIN — PROPOFOL 150 MG: 10 INJECTION, EMULSION INTRAVENOUS at 09:27

## 2024-12-22 RX ADMIN — Medication 10 ML: at 20:15

## 2024-12-22 RX ADMIN — ACETAMINOPHEN 650 MG: 325 TABLET ORAL at 12:57

## 2024-12-22 RX ADMIN — PANTOPRAZOLE SODIUM 40 MG: 40 TABLET, DELAYED RELEASE ORAL at 06:13

## 2024-12-22 RX ADMIN — SODIUM CHLORIDE: 9 INJECTION, SOLUTION INTRAVENOUS at 09:05

## 2024-12-22 RX ADMIN — POLYETHYLENE GLYCOL 3350 AND ELECTROLYTES WITH LEMON FLAVOR 2000 ML: 236; 22.74; 6.74; 5.86; 2.97 POWDER, FOR SOLUTION ORAL at 04:21

## 2024-12-22 NOTE — ANESTHESIA PREPROCEDURE EVALUATION
Anesthesia Evaluation     Patient summary reviewed and Nursing notes reviewed   NPO Solid Status: > 8 hours  NPO Liquid Status: > 2 hours           Airway   Mallampati: II  TM distance: >3 FB  Neck ROM: full  No difficulty expected  Dental    (+) edentulous and upper dentures    Pulmonary - normal exam    breath sounds clear to auscultation  (+) a smoker Former,shortness of breath  Cardiovascular     ECG reviewed  Rhythm: regular  Rate: abnormal    (+) hypertension, valvular problems/murmurs AS, AI and MS, CAD, cardiac stents Drug eluting stent more than 12 months ago , angina, EATON, hyperlipidemia    ROS comment: CAD with hx stents X2 on 12/14/23/hx AS, s/p TAVR 2/24/EF 57%, TAVR valve present with mild-mod paravalvular AI, mild MS, mild aortic root dilation (4.1 cm) by ECHO yesterday/ECG yesterday showed supraventricular bigeminy  PE comment: NSR with very frequent PACs    Neuro/Psych  GI/Hepatic/Renal/Endo    (+) obesity, morbid obesity    Musculoskeletal     Abdominal   (+) obese   Substance History      OB/GYN          Other   blood dyscrasia anemia,   history of cancer      Other Comment: Hx left breast CA/Hgb 8.4                Anesthesia Plan    ASA 3     MAC     intravenous induction     Anesthetic plan, risks, benefits, and alternatives have been provided, discussed and informed consent has been obtained with: patient.    CODE STATUS:    Code Status (Patient has no pulse and is not breathing): CPR (Attempt to Resuscitate)  Medical Interventions (Patient has pulse or is breathing): Full Support

## 2024-12-22 NOTE — ANESTHESIA POSTPROCEDURE EVALUATION
Patient: Arlene Farrell    Procedure Summary       Date: 12/22/24 Room / Location:  [facility] /  [facility]    Anesthesia Start: 0905 Anesthesia Stop: 0955    Procedures:       COLONOSCOPY tyo cecum      ESOPHAGOGASTRODUODENOSCOPY with cold biopsies (Esophagus) Diagnosis:       Iron deficiency anemia due to chronic blood loss      (Iron deficiency anemia due to chronic blood loss [D50.0])    Surgeons: Mat Corbett MD Provider: Loyd Oliva MD    Anesthesia Type: MAC ASA Status: 3            Anesthesia Type: MAC    Vitals  Vitals Value Taken Time   /70 12/22/24 1009   Temp     Pulse 85 12/22/24 1009   Resp 16 12/22/24 1007   SpO2 95 % 12/22/24 1009   Vitals shown include unfiled device data.        Post Anesthesia Care and Evaluation    Patient location during evaluation: PHASE II  Patient participation: complete - patient participated  Level of consciousness: awake and alert  Pain management: adequate    Airway patency: patent  Anesthetic complications: No anesthetic complications  PONV Status: none  Cardiovascular status: acceptable and hemodynamically stable  Respiratory status: acceptable, nonlabored ventilation and spontaneous ventilation  Hydration status: acceptable

## 2024-12-22 NOTE — SIGNIFICANT NOTE
12/22/24 0949   OTHER   Discipline physical therapist   Rehab Time/Intention   Session Not Performed patient unavailable for evaluation  (Pt off the floor to ENDO this AM. PT will check back tomorrow.)   Recommendation   PT - Next Appointment 12/23/24

## 2024-12-22 NOTE — PROGRESS NOTES
Name: Arlene Farrell ADMIT: 2024   : 1945  PCP: Provider, No Known    MRN: [id number] LOS: 2 days   AGE/SEX: 79 y.o. female  ROOM: [facility]/   Subjective   Chief Complaint   Patient presents with    Chest Pain    Shortness of Breath    Edema     80 yo with history of CAD, AS s/p TAVR 2024 who presents with dyspnea with exertion. She is found to have anemia with Hgb of 6.1    S/p prbc  S/p IV iron  Denies any bleeding  Feels a little better  LE edema improved  To have EGD/C scope today     ROS  No f/c  No n/v  No cp/palp  No soa/cough    Objective   Vital Signs  Temp:  [97.5 °F (36.4 °C)-98.2 °F (36.8 °C)] 98 °F (36.7 °C)  Heart Rate:  [69-99] 89  Resp:  [12-18] 16  BP: ()/(53-90) 107/70  SpO2:  [94 %-100 %] 96 %  on  Flow (L/min) (Oxygen Therapy):  [9] 9;   Device (Oxygen Therapy): room air  Body mass index is 37.88 kg/m².    Physical Exam  Constitutional:       General: She is not in acute distress.  HENT:      Head: Normocephalic and atraumatic.   Eyes:      General: No scleral icterus.  Cardiovascular:      Rate and Rhythm: Normal rate.   Pulmonary:      Effort: Pulmonary effort is normal. No respiratory distress.   Abdominal:      General: There is no distension.      Palpations: Abdomen is soft.   Musculoskeletal:      Cervical back: Neck supple.      Right lower leg: Edema present.      Left lower leg: Edema present.   Skin:     Coloration: Skin is pale.   Neurological:      Mental Status: She is alert.   Psychiatric:         Behavior: Behavior normal.         Results Review:       I reviewed the patient's new clinical results.  Results from last 7 days   Lab Units 24  0458 24  2122 24  0549 24  1313   WBC 10*3/mm3 7.57  --  6.03 7.42   HEMOGLOBIN g/dL 8.4* 8.6* 7.9* 6.1*   PLATELETS 10*3/mm3 315  --  277 356     Results from last 7 days   Lab Units 24  0458 24  2122 24  0549 24  1313   SODIUM mmol/L 139  --  139 136   POTASSIUM mmol/L 3.8  "3.8 3.6 4.0   CHLORIDE mmol/L 107  --  104 106   CO2 mmol/L 23.0  --  24.4 21.0*   BUN mg/dL 14  --  18 17   CREATININE mg/dL 0.88  --  1.06* 0.91   GLUCOSE mg/dL 93  --  107* 102*   Estimated Creatinine Clearance: 60.6 mL/min (by C-G formula based on SCr of 0.88 mg/dL).  Results from last 7 days   Lab Units 12/20/24  1313   ALBUMIN g/dL 3.3*   BILIRUBIN mg/dL 0.4   ALK PHOS U/L 74   AST (SGOT) U/L 17   ALT (SGPT) U/L 10     Results from last 7 days   Lab Units 12/22/24  0458 12/21/24  0549 12/20/24  1313   CALCIUM mg/dL 8.9 8.5* 8.6   ALBUMIN g/dL  --   --  3.3*   MAGNESIUM mg/dL 2.2 2.2  --          Coag     HbA1C   Lab Results   Component Value Date    HGBA1C 6.00 (H) 12/20/2024    HGBA1C 5.70 (H) 02/02/2024    HGBA1C 6.40 (H) 12/15/2023     Infection     Radiology(recent) XR Chest 1 View    Result Date: 12/20/2024  No focal pulmonary consolidation. Borderline heart size. Tortuous aorta. Follow-up as clinically indicated.  This report was finalized on 12/20/2024 12:37 PM by Dr. Eliel Cifuentes M.D on Workstation: [id number]     HS Troponin T   Date Value Ref Range Status   12/20/2024 13 <14 ng/L Final   12/20/2024 14 (H) <14 ng/L Final     No components found for: \"TSH;2\"    aspirin, 81 mg, Oral, Daily  ferric gluconate, 250 mg, Intravenous, Daily  lisinopril, 40 mg, Oral, Daily  pantoprazole, 40 mg, Oral, BID AC  sodium chloride, 10 mL, Intravenous, Q12H  verapamil SR, 180 mg, Oral, Daily       Diet: Regular/House; Fluid Consistency: Thin (IDDSI 0)      Assessment & Plan      Active Hospital Problems    Diagnosis  POA    **Symptomatic anemia [D64.9]  Yes    Iron deficiency anemia due to chronic blood loss [D50.0]  Yes    S/P TAVR (transcatheter aortic valve replacement) [Z95.2]  Not Applicable    Hypertension [I10]  Yes    HLD (hyperlipidemia) [E78.5]  Yes    Coronary artery disease involving native coronary artery of native heart with angina pectoris [I25.119]  Yes    Nonrheumatic aortic valve stenosis [I35.0]  " Yes      Resolved Hospital Problems   No resolved problems to display.     78 yo with history of CAD, AS s/p TAVR Feb 2024 who presents with dyspnea with exertion. She is found to have anemia with Hgb of 6.1    S/p PRBC and will have IV iron as well. Monitor H/H. GI following and had endoscopy today without active bleeding  Cardiology follow for CHF, CAD, tachycardia. On ASA. Verapamil  Off plavix for now with plans on endoscopy.   S/p lasix on 12/20, s/p echo. Cardiology following    Dispo- likely to home 12/23  ROGERIO RN  ROGERIO Napier    Reviewed records      Vazquez Carbajal MD  [facility]  12/22/24  07:54 EST

## 2024-12-22 NOTE — BRIEF OP NOTE
COLONOSCOPY, ESOPHAGOGASTRODUODENOSCOPY  Progress Note    Arlene Farrell  12/22/2024    Pre-op Diagnosis:   Iron deficiency anemia due to chronic blood loss [D50.0]       Post-Op Diagnosis Codes:     * Iron deficiency anemia due to chronic blood loss [D50.0]    Procedure/CPT® Codes:        Procedure(s):  COLONOSCOPY tyo cecum  ESOPHAGOGASTRODUODENOSCOPY with cold biopsies        Surgeon(s):  Mat Corbett MD    Anesthesia: Monitored Anesthesia Care    Staff:   Endo Technician: Mckenna Alas  Endo Nurse: Janet Sanchez RN         Estimated Blood Loss: none    Urine Voided: * No values recorded between 12/22/2024  9:06 AM and 12/22/2024  9:47 AM *    Specimens:                          Drains: * No LDAs found *    Findings:     EGD:    Medium sized hiatus hernia.  The examination was otherwise normal up to second part of duodenum.  Biopsies were obtained from the deep descending duodenal folds to look for any evidence of partial villous atrophy or celiac disease    COLONOSCOPY TO CECUM    Moderate external and internal hemorrhoids, not bleeding  Sigmoid and descending colon diverticulosis  The examination was otherwise normal up to cecum with excellent preparation and good visualization.  No polyps, masses, or angiodysplasias were present.      Complications: None    Recommendations:    Regular diet  Patient can be discharged from gastrointestinal standpoint.  Should be followed up in the outpatient GI clinic in 3 months time.  In case of worsening anemia, consider outpatient M2A capsule OR WCE study.  She will also have a CBC drawn 1 week prior to clinic visit.      GI service will sign off.  Please call if needed.          Mat Corbett MD     Date: 12/22/2024  Time: 10:10 EST

## 2024-12-22 NOTE — PLAN OF CARE
Goal Outcome Evaluation:      Pt alert and oriented. VSS. On RA. Pt on bowel prep for EGD and colonoscopy today. Pt tolerating well. Plan of care ongoing.

## 2024-12-22 NOTE — PLAN OF CARE
Pt oriented BP low after EGD/scope held BP meds. Up adlib tolerating diet. No signs of bleeding      Problem: Adult Inpatient Plan of Care  Goal: Plan of Care Review  Outcome: Progressing  Goal: Patient-Specific Goal (Individualized)  Outcome: Progressing  Goal: Absence of Hospital-Acquired Illness or Injury  Outcome: Progressing  Intervention: Identify and Manage Fall Risk  Recent Flowsheet Documentation  Taken 12/22/2024 1400 by Radha Sevilla RN  Safety Promotion/Fall Prevention:   activity supervised   assistive device/personal items within reach   clutter free environment maintained   fall prevention program maintained   nonskid shoes/slippers when out of bed   room organization consistent   safety round/check completed  Taken 12/22/2024 0815 by Radha Sevilla RN  Safety Promotion/Fall Prevention:   activity supervised   assistive device/personal items within reach   clutter free environment maintained   fall prevention program maintained   nonskid shoes/slippers when out of bed   room organization consistent   safety round/check completed  Intervention: Prevent Skin Injury  Recent Flowsheet Documentation  Taken 12/22/2024 1400 by Radha Sevilla RN  Body Position: position changed independently  Taken 12/22/2024 1200 by Radha Sevilla RN  Body Position: position changed independently  Taken 12/22/2024 1035 by Radha Sevilla RN  Body Position: position changed independently  Taken 12/22/2024 0815 by Radha Sevilla RN  Body Position: position changed independently  Intervention: Prevent Infection  Recent Flowsheet Documentation  Taken 12/22/2024 1600 by Radha Sevilla RN  Infection Prevention:   single patient room provided   rest/sleep promoted  Taken 12/22/2024 1400 by Radha Sevilla RN  Infection Prevention:   single patient room provided   rest/sleep promoted  Taken 12/22/2024 1200 by Radha Sevilla RN  Infection Prevention:   rest/sleep promoted   single patient room provided  Taken  12/22/2024 1035 by Radha Sevilla RN  Infection Prevention:   rest/sleep promoted   single patient room provided  Taken 12/22/2024 0815 by Radha Sevilla RN  Infection Prevention:   single patient room provided   rest/sleep promoted  Goal: Optimal Comfort and Wellbeing  Outcome: Progressing  Intervention: Provide Person-Centered Care  Recent Flowsheet Documentation  Taken 12/22/2024 1400 by Radha Sevilla RN  Trust Relationship/Rapport: care explained  Taken 12/22/2024 0815 by Radha Sevilla RN  Trust Relationship/Rapport:   care explained   thoughts/feelings acknowledged   reassurance provided   questions encouraged   questions answered   emotional support provided  Goal: Readiness for Transition of Care  Outcome: Progressing     Problem: Comorbidity Management  Goal: Maintenance of Heart Failure Symptom Control  Outcome: Progressing  Intervention: Maintain Heart Failure Management  Recent Flowsheet Documentation  Taken 12/22/2024 1400 by Radha Sevilla RN  Medication Review/Management: medications reviewed  Taken 12/22/2024 0815 by Radha Sevilla RN  Medication Review/Management: medications reviewed   Goal Outcome Evaluation:

## 2024-12-23 ENCOUNTER — TELEPHONE (OUTPATIENT)
Dept: GASTROENTEROLOGY | Facility: CLINIC | Age: 79
End: 2024-12-23
Payer: MEDICARE

## 2024-12-23 VITALS
RESPIRATION RATE: 16 BRPM | BODY MASS INDEX: 37.75 KG/M2 | TEMPERATURE: 97.8 F | HEART RATE: 93 BPM | DIASTOLIC BLOOD PRESSURE: 71 MMHG | HEIGHT: 64 IN | WEIGHT: 221.12 LBS | SYSTOLIC BLOOD PRESSURE: 147 MMHG | OXYGEN SATURATION: 99 %

## 2024-12-23 DIAGNOSIS — D64.9 ANEMIA, UNSPECIFIED TYPE: Primary | ICD-10-CM

## 2024-12-23 LAB
ANION GAP SERPL CALCULATED.3IONS-SCNC: 8.5 MMOL/L (ref 5–15)
BASOPHILS # BLD AUTO: 0.06 10*3/MM3 (ref 0–0.2)
BASOPHILS NFR BLD AUTO: 0.8 % (ref 0–1.5)
BUN SERPL-MCNC: 17 MG/DL (ref 8–23)
BUN/CREAT SERPL: 15.3 (ref 7–25)
CALCIUM SPEC-SCNC: 8.7 MG/DL (ref 8.6–10.5)
CHLORIDE SERPL-SCNC: 107 MMOL/L (ref 98–107)
CO2 SERPL-SCNC: 24.5 MMOL/L (ref 22–29)
CREAT SERPL-MCNC: 1.11 MG/DL (ref 0.57–1)
DEPRECATED RDW RBC AUTO: 47.1 FL (ref 37–54)
EGFRCR SERPLBLD CKD-EPI 2021: 51 ML/MIN/1.73
EOSINOPHIL # BLD AUTO: 0.17 10*3/MM3 (ref 0–0.4)
EOSINOPHIL NFR BLD AUTO: 2.4 % (ref 0.3–6.2)
ERYTHROCYTE [DISTWIDTH] IN BLOOD BY AUTOMATED COUNT: 17.8 % (ref 12.3–15.4)
GLUCOSE SERPL-MCNC: 110 MG/DL (ref 65–99)
HCT VFR BLD AUTO: 27.1 % (ref 34–46.6)
HGB BLD-MCNC: 8.1 G/DL (ref 12–15.9)
IMM GRANULOCYTES # BLD AUTO: 0.03 10*3/MM3 (ref 0–0.05)
IMM GRANULOCYTES NFR BLD AUTO: 0.4 % (ref 0–0.5)
LYMPHOCYTES # BLD AUTO: 1.51 10*3/MM3 (ref 0.7–3.1)
LYMPHOCYTES NFR BLD AUTO: 21.1 % (ref 19.6–45.3)
MCH RBC QN AUTO: 22.3 PG (ref 26.6–33)
MCHC RBC AUTO-ENTMCNC: 29.9 G/DL (ref 31.5–35.7)
MCV RBC AUTO: 74.7 FL (ref 79–97)
MONOCYTES # BLD AUTO: 0.75 10*3/MM3 (ref 0.1–0.9)
MONOCYTES NFR BLD AUTO: 10.5 % (ref 5–12)
NEUTROPHILS NFR BLD AUTO: 4.64 10*3/MM3 (ref 1.7–7)
NEUTROPHILS NFR BLD AUTO: 64.8 % (ref 42.7–76)
PLATELET # BLD AUTO: 299 10*3/MM3 (ref 140–450)
PMV BLD AUTO: 9.4 FL (ref 6–12)
POTASSIUM SERPL-SCNC: 3.8 MMOL/L (ref 3.5–5.2)
RBC # BLD AUTO: 3.63 10*6/MM3 (ref 3.77–5.28)
SODIUM SERPL-SCNC: 140 MMOL/L (ref 136–145)
WBC NRBC COR # BLD AUTO: 7.16 10*3/MM3 (ref 3.4–10.8)

## 2024-12-23 PROCEDURE — 99232 SBSQ HOSP IP/OBS MODERATE 35: CPT | Performed by: STUDENT IN AN ORGANIZED HEALTH CARE EDUCATION/TRAINING PROGRAM

## 2024-12-23 PROCEDURE — 80048 BASIC METABOLIC PNL TOTAL CA: CPT | Performed by: HOSPITALIST

## 2024-12-23 PROCEDURE — 85025 COMPLETE CBC W/AUTO DIFF WBC: CPT | Performed by: HOSPITALIST

## 2024-12-23 RX ORDER — PANTOPRAZOLE SODIUM 40 MG/1
40 TABLET, DELAYED RELEASE ORAL
Qty: 60 TABLET | Refills: 0 | Status: SHIPPED | OUTPATIENT
Start: 2024-12-23

## 2024-12-23 RX ORDER — FERROUS SULFATE 325(65) MG
325 TABLET ORAL
Qty: 30 TABLET | Refills: 0 | Status: SHIPPED | OUTPATIENT
Start: 2024-12-23

## 2024-12-23 RX ADMIN — VERAPAMIL HYDROCHLORIDE 180 MG: 180 TABLET, FILM COATED, EXTENDED RELEASE ORAL at 08:06

## 2024-12-23 RX ADMIN — ASPIRIN 81 MG: 81 TABLET, COATED ORAL at 08:06

## 2024-12-23 RX ADMIN — LISINOPRIL 40 MG: 20 TABLET ORAL at 08:06

## 2024-12-23 RX ADMIN — Medication 10 ML: at 08:10

## 2024-12-23 RX ADMIN — PANTOPRAZOLE SODIUM 40 MG: 40 TABLET, DELAYED RELEASE ORAL at 06:13

## 2024-12-23 NOTE — CASE MANAGEMENT/SOCIAL WORK
Discharge Planning Assessment  [facility]     Patient Name: Arlene Farrell  MRN: [id number]  Today's Date: 12/23/2024    Admit Date: 12/20/2024    Plan: Plan home.  ALLY Gaona RN   Discharge Needs Assessment       Row Name 12/23/24 1002       Living Environment    People in Home facility resident    Current Living Arrangements apartment    Potentially Unsafe Housing Conditions none    In the past 12 months has the electric, gas, oil, or water company threatened to shut off services in your home? No    Primary Care Provided by self    Provides Primary Care For no one, unable/limited ability to care for self    Family Caregiver if Needed friend(s)    Family Caregiver Names Friend  ( Xochilt Upton [phone])    Quality of Family Relationships supportive;helpful    Able to Return to Prior Arrangements yes    Living Arrangement Comments Pt lives in an apartment at [facility] and also works at facility.       Resource/Environmental Concerns    Resource/Environmental Concerns none    Transportation Concerns none       Transportation Needs    In the past 12 months, has lack of transportation kept you from medical appointments or from getting medications? no    In the past 12 months, has lack of transportation kept you from meetings, work, or from getting things needed for daily living? No       Food Insecurity    Within the past 12 months, you worried that your food would run out before you got the money to buy more. Never true    Within the past 12 months, the food you bought just didn't last and you didn't have money to get more. Never true       Transition Planning    Patient/Family Anticipates Transition to home    Patient/Family Anticipated Services at Transition none    Transportation Anticipated family or friend will provide       Discharge Needs Assessment    Readmission Within the Last 30 Days no previous admission in last 30 days    Equipment Currently Used at Home none    Concerns to be  Addressed no discharge needs identified;denies needs/concerns at this time    Anticipated Changes Related to Illness none    Equipment Needed After Discharge none                   Discharge Plan       Row Name 12/23/24 1004       Plan    Plan Plan home.  ALLY Gaona RN    Patient/Family in Agreement with Plan yes    Plan Comments FACE SHEET VERIFIED/ IM LETTER SIGNED.  Spoke with pt at bedside. Pt does not have PCP she states she goes to [facility] on [address]. Pt provided with number for [facility] Liaison. Pt lives in an apartment at [facility] and also works at that facility.  Pt is independent with ADLs.  Pt does not use any DMEs.  Pt gets her prescriptions at [facility] on [address].  Pt denies any issues affording medications. Pt is not current with .  Pt has not been in SNF.  Pt denies any discharge needs. Pt's friend (Xochilt Upton [phone]) will assist her at home if needed and will provide transportation home for pt. Plan home.  ALLY Gaona RN                  Continued Care and Services - Admitted Since 12/20/2024    No active coordination exists for this encounter.       Expected Discharge Date and Time       Expected Discharge Date Expected Discharge Time    Dec 24, 2024            Demographic Summary       Row Name 12/23/24 1001       General Information    Admission Type inpatient    Arrived From emergency department    Required Notices Provided Important Message from Medicare    Referral Source admission list    Reason for Consult discharge planning    Preferred Language English                   Functional Status       Row Name 12/23/24 1002       Functional Status    Usual Activity Tolerance good    Current Activity Tolerance good       Functional Status, IADL    Medications independent    Meal Preparation assistive person    Housekeeping assistive person    Laundry assistive person    Shopping assistive person       Mental Status    General Appearance WDL WDL                    Psychosocial    No documentation.                  Abuse/Neglect    No documentation.                  Legal    No documentation.                  Substance Abuse    No documentation.                  Patient Forms    No documentation.                     Marina Gaona RN

## 2024-12-23 NOTE — PROGRESS NOTES
"    Patient Name: Arlene Farrell  :1945  79 y.o.      Patient Care Team:  Provider, No Known as PCP - General    Chief Complaint:   Anemia    Interval History:   EGD/[place] without any evidence of bleeding.    Objective   Vital Signs  Temp:  [97.8 °F (36.6 °C)-98.6 °F (37 °C)] 97.8 °F (36.6 °C)  Heart Rate:  [69-95] 93  Resp:  [15-17] 16  BP: ()/(53-78) 147/71    Intake/Output Summary (Last 24 hours) at 2024 0951  Last data filed at 2024 0955  Gross per 24 hour   Intake 500 ml   Output --   Net 500 ml     Flowsheet Rows      Flowsheet Row First Filed Value   Admission Height 163.8 cm (64.5\") Documented at 2024 1308   Admission Weight 103 kg (226 lb 12.8 oz) Documented at 2024 1308            GEN: no distress, alert and oriented  HEENT: NACT, EOMI, moist mucous membranes  Lungs: CTAB, no wheezes, rales or rhonchi  CV: normal rate, regular rhythm, normal S1, S2, no murmurs, +2 radial pulses b/l, no carotid bruit  Abdomen: soft, nontender, nondistended, NABS  Extremities: no edema  Skin: no rash, warm, dry  Heme/Lymph: no bruising  Psych: organized thought, normal behavior and affect    Results Review:    Results from last 7 days   Lab Units 24  0337   SODIUM mmol/L 140   POTASSIUM mmol/L 3.8   CHLORIDE mmol/L 107   CO2 mmol/L 24.5   BUN mg/dL 17   CREATININE mg/dL 1.11*   GLUCOSE mg/dL 110*   CALCIUM mg/dL 8.7     Results from last 7 days   Lab Units 24  1404 24  1313   HSTROP T ng/L 13 14*     Results from last 7 days   Lab Units 24  0338   WBC 10*3/mm3 7.16   HEMOGLOBIN g/dL 8.1*   HEMATOCRIT % 27.1*   PLATELETS 10*3/mm3 299         Results from last 7 days   Lab Units 24  0458   MAGNESIUM mg/dL 2.2                   Medication Review:   aspirin, 81 mg, Oral, Daily  lisinopril, 40 mg, Oral, Daily  pantoprazole, 40 mg, Oral, BID AC  sodium chloride, 10 mL, Intravenous, Q12H  verapamil SR, 180 mg, Oral, Daily              Assessment & Plan   #Symptomatic " anemia  #CAD status with prior knee PCI  #Severe AS status post TAVR  #Sinus tachycardia    Echocardiogram with EF of > 55%, mild to moderate paravalvular regurgitation, mild MS    She is stable for discharge home from a cardiac standpoint.    Jeremy Awad MD, FACC, [facility]  12/23/24  09:51 EST

## 2024-12-23 NOTE — PLAN OF CARE
Goal Outcome Evaluation:      Pt resting in bed at this time. VSS. On 1L O2 NC while asleep. SOB on exertion. Plan of care ongoing.

## 2024-12-23 NOTE — CASE MANAGEMENT/SOCIAL WORK
Continued Stay Note  [facility]     Patient Name: Arlene Farrell  MRN: [id number]  Today's Date: 12/23/2024    Admit Date: 12/20/2024    Plan: Plan home.  ALLY Gaona RN   Discharge Plan       Row Name 12/23/24 1004       Plan    Plan Plan home.  ALLY Gaona RN    Patient/Family in Agreement with Plan yes    Plan Comments FACE SHEET VERIFIED/ IM LETTER SIGNED.  Spoke with pt at bedside. Pt does not have PCP she states she goes to [facility] on [address]. Pt provided with number for [facility] Liaison. Pt lives in an apartment at [facility] and also works at that facility.  Pt is independent with ADLs.  Pt does not use any DMEs.  Pt gets her prescriptions at [facility] on [address].  Pt denies any issues affording medications. Pt is not current with HH.  Pt has not been in SNF.  Pt denies any discharge needs. Pt's friend (Xochilt Upton [phone]) will assist her at home if needed and will provide transportation home for pt. Plan home.  ALLY Gaona RN                   Discharge Codes    No documentation.                 Expected Discharge Date and Time       Expected Discharge Date Expected Discharge Time    Dec 24, 2024               Marina Gaona RN

## 2024-12-23 NOTE — TELEPHONE ENCOUNTER
----- Message from Mat Corbett sent at 12/22/2024 10:13 AM EST -----  Regarding: Routine outpatient follow-up visit  This patient needs an outpatient follow-up in 3 months.  She also needs a CBC drawn 1 week prior to clinic visit.    Reason iron deficiency anemia with unremarkable EGD and colonoscopy  May need WCE

## 2024-12-23 NOTE — CASE MANAGEMENT/SOCIAL WORK
Case Management Discharge Note      Final Note: Pt discharged home.   ALLY Gaona RN         Selected Continued Care - Admitted Since 12/20/2024       Destination    No services have been selected for the patient.                Durable Medical Equipment    No services have been selected for the patient.                Dialysis/Infusion    No services have been selected for the patient.                Home Medical Care    No services have been selected for the patient.                Therapy    No services have been selected for the patient.                Community Resources    No services have been selected for the patient.                Community & DME    No services have been selected for the patient.                    Transportation Services  Private: Car    Final Discharge Disposition Code: 01 - home or self-care

## 2024-12-23 NOTE — DISCHARGE SUMMARY
NAME: Arlene Farrell ADMIT: 2024   : 1945  PCP: Provider, No Known    MRN: [id number] LOS: 3 days   AGE/SEX: 79 y.o. female  ROOM: 3110/     Date of Admission:  2024  Date of Discharge:  2024    PCP: Provider, No Known    CHIEF COMPLAINT  Chest Pain, Shortness of Breath, and Edema      DISCHARGE DIAGNOSIS  Active Hospital Problems    Diagnosis  POA    **Symptomatic anemia [D64.9]  Yes    Iron deficiency anemia due to chronic blood loss [D50.0]  Yes    S/P TAVR (transcatheter aortic valve replacement) [Z95.2]  Not Applicable    Hypertension [I10]  Yes    HLD (hyperlipidemia) [E78.5]  Yes    Coronary artery disease involving native coronary artery of native heart with angina pectoris [I25.119]  Yes    Nonrheumatic aortic valve stenosis [I35.0]  Yes      Resolved Hospital Problems   No resolved problems to display.       SECONDARY DIAGNOSES  Past Medical History:   Diagnosis Date    Angina pectoris     Aortic valve stenosis     Dyspnea on exertion     Heart valve disease     History of breast cancer     LEFT    HLD (hyperlipidemia) 2023    Hypertension 2023       CONSULTS   GI  Cardiology    HOSPITAL COURSE  78 yo with history of CAD, AS s/p TAVR 2024 who presents with dyspnea with exertion. She is found to have anemia with Hgb of 6.1     S/p PRBC and will have IV iron as well. Hgb improved. GI following and had endoscopy without active bleeding. There findings are listed below. She overall feels better and wishes for discharge today.    EGD:     Medium sized hiatus hernia.  The examination was otherwise normal up to second part of duodenum.  Biopsies were obtained from the deep descending duodenal folds to look for any evidence of partial villous atrophy or celiac disease     COLONOSCOPY TO CECUM     Moderate external and internal hemorrhoids, not bleeding  Sigmoid and descending colon diverticulosis  The examination was otherwise normal up to cecum with excellent  preparation and good visualization.  No polyps, masses, or angiodysplasias were present.        Complications: None     Recommendations:     Regular diet  Patient can be discharged from gastrointestinal standpoint.  Should be followed up in the outpatient GI clinic in 3 months time.  In case of worsening anemia, consider outpatient M2A capsule OR WCE study.  She will also have a CBC drawn 1 week prior to clinic visit.    DIAGNOSTICS  2D ECHO12/21/24    Left ventricular systolic function is normal. Calculated left ventricular EF = 56.8%    Left ventricular wall thickness is consistent with mild concentric hypertrophy.    Left ventricular diastolic function was indeterminate due to functional mitral stenosis and frequent ectopy.    There is a TAVR valve present (26mm Tate Jose). The mean gradient is elevated at 21 mmHg.  Mild to moderate paravalvular regurgitation is present.    Mild functional mitral valve stenosis is present. The mitral valve mean gradient is 4 mmHg at HR of 93 bpm.    Estimated right ventricular systolic pressure from tricuspid regurgitation is mildly elevated (35-45 mmHg).    Mild dilation of the ascending aorta is present (4.1 cm).    The left atrial cavity is mild to moderately dilated on manual quantitation.     /23/2024 0338 12/23/2024 0428 CBC Auto Differential [[id number]]   (Abnormal)   Blood    Final result Component Value Units   WBC 7.16 10*3/mm3   RBC 3.63 Low  10*6/mm3   Hemoglobin 8.1 Low  g/dL   Hematocrit 27.1 Low  %   MCV 74.7 Low  fL   MCH 22.3 Low  pg   MCHC 29.9 Low  g/dL   RDW 17.8 High  %   RDW-SD 47.1 fl   MPV 9.4 fL   Platelets 299 10*3/mm3   Neutrophil % 64.8 %   Lymphocyte % 21.1 %   Monocyte % 10.5 %   Eosinophil % 2.4 %   Basophil % 0.8 %   Immature Grans % 0.4 %   Neutrophils, Absolute 4.64 10*3/mm3   Lymphocytes, Absolute 1.51 10*3/mm3   Monocytes, Absolute 0.75 10*3/mm3   Eosinophils, Absolute 0.17 10*3/mm3   Basophils, Absolute 0.06 10*3/mm3   Immature Grans,  "Absolute 0.03 10*3/mm3          12/23/2024 0337 12/23/2024 0445 Basic Metabolic Panel [[id number]]    (Abnormal)   Blood    Final result Component Value Units   Glucose 110 High  mg/dL   BUN 17 mg/dL   Creatinine 1.11 High  mg/dL   Sodium 140 mmol/L   Potassium 3.8 mmol/L   Chloride 107 mmol/L   CO2 24.5 mmol/L   Calcium 8.7 mg/dL   BUN/Creatinine Ratio 15.3    Anion Gap 8.5 mmol/L   eGFR 51.0 Low  mL/min/1.73        12/20/2024 1616 12/20/2024 1713 Folate [[id number]]    Blood    Final result Component Value Units   Folate 18.40 ng/mL          12/20/2024 1616 12/20/2024 1713 TSH Rfx On Abnormal To Free T4 [[id number]]   Blood    Final result Component Value Units   TSH 3.280 uIU/mL        12/20/2024 1313 12/20/2024 1538 Vitamin B12 [[id number]]    (Abnormal)   Blood    Final result Component Value Units   Vitamin B-12 1,302 High  pg/mL          12/20/2024 1313 12/20/2024 1527 Haptoglobin [[id number]]   Blood    Final result Component Value Units   Haptoglobin 90  mg/dL          12/20/2024 1313 12/20/2024 1526 Iron Profile [[id number]]   (Abnormal)   Blood    Final result Component Value Units   Iron 14 Low  mcg/dL   Iron Saturation (TSAT) 4 Low  %   Transferrin 262 mg/dL   TIBC 390 mcg/dL          12/20/2024 1313 12/20/2024 1527 Lactate Dehydrogenase [[id number]]   (Abnormal)   Blood    Final result Component Value Units    High   U/L          12/20/2024 1313 12/20/2024 1403 Reticulocytes [[id number]]   Blood    Final result Component Value Units   Reticulocyte % 1.77 %   Reticulocyte Absolute 0.0533 10*6/mm3          12/20/2024 1313 12/20/2024 1709 Hemoglobin A1c [[id number]]    (Abnormal)   Blood    Final result Component Value Units   Hemoglobin A1C 6.00 High  %          PHYSICAL EXAM  Objective:  Vital signs: (most recent): Blood pressure 147/71, pulse 93, temperature 97.8 °F (36.6 °C), temperature source Oral, resp. rate 16, height 162.6 cm (64\"), weight 100 kg (221 lb 1.9 oz), SpO2 99%.  "               Alert  nad  No resp distress  Wishes for dc today     CONDITION ON DISCHARGE  Stable.      DISCHARGE DISPOSITION   Home or Self Care      DISCHARGE MEDICATIONS       Your medication list        START taking these medications        Instructions Last Dose Given Next Dose Due   ferrous sulfate 325 (65 FE) MG tablet      Take 1 tablet by mouth Daily With Breakfast.       pantoprazole 40 MG EC tablet  Commonly known as: PROTONIX      Take 1 tablet by mouth 2 (Two) Times a Day Before Meals.              CONTINUE taking these medications        Instructions Last Dose Given Next Dose Due   atorvastatin 40 MG tablet  Commonly known as: LIPITOR      Take 1 tablet by mouth Every Night.       clopidogrel 75 MG tablet  Commonly known as: PLAVIX      Take 1 tablet by mouth Daily.       hydroCHLOROthiazide 25 MG tablet      Take 1 tablet by mouth Daily As Needed (swelling).       lisinopril 40 MG tablet  Commonly known as: PRINIVIL,ZESTRIL      Take 1 tablet by mouth Daily.       nitroglycerin 0.4 MG SL tablet  Commonly known as: NITROSTAT      Place 1 tablet under the tongue Every 5 (Five) Minutes As Needed for Chest Pain (Systolic BP Greater Than 100). Take no more than 3 doses in 15 minutes.       verapamil  MG CR tablet  Commonly known as: CALAN-SR      Take 1 tablet by mouth Daily.              STOP taking these medications      nitrofurantoin (macrocrystal-monohydrate) 100 MG capsule  Commonly known as: Macrobid                  Where to Get Your Medications        These medications were sent to [facility]/pharmacy #[id number] - [place], [place] - [address] [place] [address] [phone] [facility] [phone] [address] [address]      Phone: [phone]   ferrous sulfate 325 (65 FE) MG tablet  pantoprazole 40 MG EC tablet        No future appointments.  Additional Instructions for the Follow-ups that You Need to Schedule       Discharge Follow-up with Specialty: PCP in 1-2 weeks, Cardiology in 2-3 weeks, GI in 3  months or as otherwise directed   As directed      Specialty: PCP in 1-2 weeks, Cardiology in 2-3 weeks, GI in 3 months or as otherwise directed               Follow-up Information       Provider, No Known .    Contact information:  [facility] [id number]                             TEST  RESULTS PENDING AT DISCHARGE  Pending Labs       Order Current Status    Tissue Pathology Exam In process               Vazquez Carbajal MD  [facility]  12/23/24  11:36 EST      Time: greater than 32 minutes on discharge  It was a pleasure taking care of this patient while in the hospital.

## 2024-12-23 NOTE — TELEPHONE ENCOUNTER
Called the pt to schedule her 3 month hospital follow up.  She has [employer] insurance and it is out of network with [facility] and I explained that and she said that she would make an appointment somewhere that excepts her insurance.

## 2024-12-23 NOTE — SIGNIFICANT NOTE
12/23/24 0759   OTHER   Discipline physical therapist   Therapy Assessment/Plan (PT)   Criteria for Skilled Interventions Met (PT) no problems identified which require skilled intervention  (Per adult pcs, pt is up adlib/indep and with ampac of 24.  Currently no indication for acute PT needs.  Continue to amb indep.)

## 2024-12-26 LAB
CYTO UR: NORMAL
LAB AP CASE REPORT: NORMAL
LAB AP DIAGNOSIS COMMENT: NORMAL
PATH REPORT.FINAL DX SPEC: NORMAL
PATH REPORT.GROSS SPEC: NORMAL

## 2025-02-24 ENCOUNTER — TELEPHONE (OUTPATIENT)
Dept: GASTROENTEROLOGY | Facility: CLINIC | Age: 80
End: 2025-02-24
Payer: MEDICARE

## 2025-02-24 NOTE — TELEPHONE ENCOUNTER
----- Message from Mat Corbett sent at 2/24/2025  9:31 AM EST -----  Needs follow-up in the outpatient GI clinic in 2 to 3 months

## 2025-03-19 ENCOUNTER — TELEPHONE (OUTPATIENT)
Dept: GASTROENTEROLOGY | Facility: CLINIC | Age: 80
End: 2025-03-19
Payer: MEDICARE

## 2025-03-27 ENCOUNTER — TELEPHONE (OUTPATIENT)
Dept: GASTROENTEROLOGY | Facility: CLINIC | Age: 80
End: 2025-03-27
Payer: MEDICARE

## 2025-03-27 NOTE — TELEPHONE ENCOUNTER
Called and LVM message about CBC blood test, pt may have to pay out of pocket for visit, has not been scheduled.      Pt is needing CBC drawn 1 week prior to clinic visit per TE on 12/23/24. Pt's insurance is out of network and pt was notified on 12/23/24.

## 2025-03-27 NOTE — TELEPHONE ENCOUNTER
Patient is due for labs, but she has [employer] insurance and is out of network w/ [facility] and will have to make an appt somewhere that excepts her insurance. Is it ok to cancel lab order. Please advise.

## 2025-06-23 RX ORDER — LISINOPRIL 40 MG/1
40 TABLET ORAL DAILY
Qty: 30 TABLET | Refills: 0 | Status: SHIPPED | OUTPATIENT
Start: 2025-06-23

## 2025-08-13 RX ORDER — LISINOPRIL 40 MG/1
40 TABLET ORAL DAILY
Qty: 30 TABLET | Refills: 0 | Status: SHIPPED | OUTPATIENT
Start: 2025-08-13

## (undated) DEVICE — KT MANIFLD CARDIAC

## (undated) DEVICE — BIOPATCH™ ANTIMICROBIAL DRESSING WITH CHLORHEXIDINE GLUCONATE IS A HYDROPHILLIC POLYURETHANE ABSORPTIVE FOAM WITH CHLORHEXIDINE GLUCONATE (CHG) WHICH INHIBITS BACTERIAL GROWTH UNDER THE DRESSING. THE DRESSING IS INTENDED TO BE USED TO ABSORB EXUDATE, COVER A WOUND CAUSED BY VASCULAR AND NONVASCULAR PERCUTANEOUS MEDICAL DEVICES DURING SURGERY, AS WELL AS REDUCE LOCAL INFECTION AND COLONIZATION OF MICROORGANISMS.: Brand: BIOPATCH

## (undated) DEVICE — DECANTER BAG 9": Brand: MEDLINE INDUSTRIES, INC.

## (undated) DEVICE — GW EMR FIX EXCHG J STD .035 3MM 260CM

## (undated) DEVICE — SENSR O2 OXIMAX FNGR A/ 18IN NONSTR

## (undated) DEVICE — CATH DIAG IMPULSE FL4 5F 100CM

## (undated) DEVICE — SOL IRR NACL 0.9PCT BT 1000ML

## (undated) DEVICE — SPNG LAP 18X18IN LF STRL PK/5

## (undated) DEVICE — PK CATH CARD 40

## (undated) DEVICE — NC TREK NEO™ CORONARY DILATATION CATHETER 3.50 MM X 20 MM / RAPID-EXCHANGE: Brand: NC TREK NEO™

## (undated) DEVICE — GLV SURG BIOGEL LTX PF 8

## (undated) DEVICE — CATH DIAG IMPULSE PIG 5F 100CM

## (undated) DEVICE — CATH DIAG IMPULSE FR4 5F 100CM

## (undated) DEVICE — CVR PROB 96IN LF STRL

## (undated) DEVICE — INTRO SHEATH ART/FEM ENGAGE .035 6F12CM

## (undated) DEVICE — SOL NACL 0.9PCT 1000ML

## (undated) DEVICE — SNAP KAP: Brand: UNBRANDED

## (undated) DEVICE — ST. SORBAVIEW ULTIMATE IJ SYSTEM A,C: Brand: CENTURION

## (undated) DEVICE — Device

## (undated) DEVICE — DRSNG SURESITE WNDW 2.38X2.75

## (undated) DEVICE — GW XCHG AMPLTZ XSTIF PTFE CRV .035 3X260

## (undated) DEVICE — CVR HNDL LT SURG ACCSSRY BLU STRL

## (undated) DEVICE — TBG INJ CONTRL PVCCLR RIGD RA 1200PSI 10

## (undated) DEVICE — EQUIPMENT COVER BAG TYPE 48” X 36” (122CM X 91CM): Brand: EQUIPMENT COVER BAG TYPE

## (undated) DEVICE — GLIDESHEATH BASIC HYDROPHILIC COATED INTRODUCER SHEATH: Brand: GLIDESHEATH

## (undated) DEVICE — CANN O2 ETCO2 FITS ALL CONN CO2 SMPL A/ 7IN DISP LF

## (undated) DEVICE — KT ORCA ORCAPOD DISP STRL

## (undated) DEVICE — LABEL SHEET CUSTOM 2X2 YELLOW: Brand: MEDLINE INDUSTRIES, INC.

## (undated) DEVICE — GW HITORQUE/BAL MID/WT J W/HCOAT .014 3X190CM

## (undated) DEVICE — PERCLOSE™ PROSTYLE™ SUTURE-MEDIATED CLOSURE AND REPAIR SYSTEM: Brand: PERCLOSE™ PROSTYLE™

## (undated) DEVICE — 3M™ BAIR HUGGER® UNDERBODY BLANKET, FULL ACCESS, 10 PER CASE 63500: Brand: BAIR HUGGER™

## (undated) DEVICE — SOL ISO/ALC 70PCT 4OZ

## (undated) DEVICE — BLCK/BITE BLOX W/DENTL/RIM W/STRAP 54F

## (undated) DEVICE — RADIFOCUS GLIDEWIRE: Brand: GLIDEWIRE

## (undated) DEVICE — DRAPE,REIN 53X77,STERILE: Brand: MEDLINE

## (undated) DEVICE — SPNG GZ WOVN 4X4IN 12PLY 10/BX STRL

## (undated) DEVICE — CATH DIAG CARD PERFORM JR4.0 BT 4F100CM

## (undated) DEVICE — TBG ART PRESS 60 IN

## (undated) DEVICE — TUBING, SUCTION, 1/4" X 10', STRAIGHT: Brand: MEDLINE

## (undated) DEVICE — CONTAINER,SPECIMEN,OR STERILE,4OZ: Brand: MEDLINE

## (undated) DEVICE — TRANSD PRESS STOPCOCK1WAY CABL48IN

## (undated) DEVICE — LN SMPL CO2 SHTRM SD STREAM W/M LUER

## (undated) DEVICE — CATH DIAG CARD PERFORMA JL3.5 BT 4F100CM

## (undated) DEVICE — DEV INDEFLATOR P/N 580289

## (undated) DEVICE — TREK CORONARY DILATATION CATHETER 3.50 MM X 20 MM / RAPID-EXCHANGE: Brand: TREK

## (undated) DEVICE — PINNACLE INTRODUCER SHEATH: Brand: PINNACLE

## (undated) DEVICE — TOWEL,OR,DSP,ST,BLUE,STD,4/PK,20PK/CS: Brand: MEDLINE

## (undated) DEVICE — SOL NS 500ML

## (undated) DEVICE — FRCP BX RADJAW4 NDL 2.8 240CM LG OG BX40

## (undated) DEVICE — GLV SURG BIOGEL LTX PF 7 1/2

## (undated) DEVICE — SHORT SPIKE VENTED W/3-WAY SC: Brand: MEDLINE INDUSTRIES, INC.

## (undated) DEVICE — DGW .035 FC J3MM 260CM TEF: Brand: EMERALD

## (undated) DEVICE — TBG PRESS/MONITOR FIX M/F LL A/ 24IN STRL

## (undated) DEVICE — MTS LHK BHS BAPTIST LOUISVILLE: Brand: NAMIC

## (undated) DEVICE — CATH DIAG IMPULSE PIG145 6F 110CM

## (undated) DEVICE — CATH BALN LITHO INTRAVASC CORNRY 6F 138CM 3.5X12MM

## (undated) DEVICE — SOL IRR H2O BTL 1000ML STRL

## (undated) DEVICE — DISPOSABLE ADAPTER

## (undated) DEVICE — DRSNG WND GZ PAD BORDERED 4X8IN STRL

## (undated) DEVICE — GUIDELINER CATHETERS ARE INTENDED TO BE USED IN CONJUNCTION WITH GUIDE CATHETERS TO ACCESS DISCRETE REGIONS OF THE CORONARY AND/OR PERIPHERAL VASCULATURE, AND TO FACILITATE PLACEMENT OF INTERVENTIONAL DEVICES.: Brand: GUIDELINER® V3 CATHETER

## (undated) DEVICE — TAVR: Brand: MEDLINE INDUSTRIES, INC.

## (undated) DEVICE — INTRO SHEATH ART/FEM ENGAGE .035 5F12CM

## (undated) DEVICE — CATH DIAG IMPULSE AL1 6F 100CM

## (undated) DEVICE — GUIDE CATHETER: Brand: MACH1™

## (undated) DEVICE — SENSR CERBRL O2 PK/2

## (undated) DEVICE — INTRO SHEATH ART/FEM ENGAGE .035 8F12CM

## (undated) DEVICE — 3M™ IOBAN™ 2 ANTIMICROBIAL INCISE DRAPE 6650EZ: Brand: IOBAN™ 2

## (undated) DEVICE — SUT SILK 2 SUTUPAK TIE 60IN SA8H 2STRAND

## (undated) DEVICE — CATH ELECTRD PACE TEMP BI NONHEP 5F110CM

## (undated) DEVICE — TBG PRESS 96IN M/F ROT BRAID: Brand: MEDLINE INDUSTRIES, INC.

## (undated) DEVICE — ADAPT CLN BIOGUARD AIR/H2O DISP